# Patient Record
Sex: MALE | Race: WHITE | Employment: OTHER | URBAN - METROPOLITAN AREA
[De-identification: names, ages, dates, MRNs, and addresses within clinical notes are randomized per-mention and may not be internally consistent; named-entity substitution may affect disease eponyms.]

---

## 2023-10-20 ENCOUNTER — TELEPHONE (OUTPATIENT)
Dept: PRIMARY CARE | Facility: CLINIC | Age: 68
End: 2023-10-20
Payer: MEDICARE

## 2023-10-23 ENCOUNTER — OFFICE VISIT (OUTPATIENT)
Dept: PRIMARY CARE | Facility: CLINIC | Age: 68
End: 2023-10-23
Payer: MEDICARE

## 2023-10-23 ENCOUNTER — LAB (OUTPATIENT)
Dept: LAB | Facility: LAB | Age: 68
End: 2023-10-23
Payer: MEDICARE

## 2023-10-23 VITALS
SYSTOLIC BLOOD PRESSURE: 132 MMHG | TEMPERATURE: 98.3 F | WEIGHT: 295 LBS | HEART RATE: 75 BPM | DIASTOLIC BLOOD PRESSURE: 82 MMHG | BODY MASS INDEX: 41.14 KG/M2 | OXYGEN SATURATION: 91 %

## 2023-10-23 DIAGNOSIS — R10.31 GROIN PAIN, RIGHT: ICD-10-CM

## 2023-10-23 DIAGNOSIS — R79.89 LOW TESTOSTERONE IN MALE: ICD-10-CM

## 2023-10-23 DIAGNOSIS — M62.838 MUSCLE SPASM: ICD-10-CM

## 2023-10-23 DIAGNOSIS — R10.9 FLANK PAIN: Primary | ICD-10-CM

## 2023-10-23 PROBLEM — U07.1 COVID-19: Status: ACTIVE | Noted: 2023-01-17

## 2023-10-23 PROBLEM — F41.9 ANXIETY: Status: ACTIVE | Noted: 2023-10-23

## 2023-10-23 PROBLEM — L02.91 ABSCESS: Status: ACTIVE | Noted: 2023-01-17

## 2023-10-23 PROBLEM — I10 ESSENTIAL HYPERTENSION: Status: ACTIVE | Noted: 2023-01-17

## 2023-10-23 LAB
POC APPEARANCE, URINE: CLEAR
POC BILIRUBIN, URINE: NEGATIVE
POC BLOOD, URINE: NEGATIVE
POC COLOR, URINE: YELLOW
POC GLUCOSE, URINE: NEGATIVE MG/DL
POC KETONES, URINE: NEGATIVE MG/DL
POC LEUKOCYTES, URINE: NEGATIVE
POC NITRITE,URINE: NEGATIVE
POC PH, URINE: 5.5 PH
POC PROTEIN, URINE: NEGATIVE MG/DL
POC SPECIFIC GRAVITY, URINE: >=1.03
POC UROBILINOGEN, URINE: 0.2 EU/DL

## 2023-10-23 PROCEDURE — 1159F MED LIST DOCD IN RCRD: CPT | Performed by: STUDENT IN AN ORGANIZED HEALTH CARE EDUCATION/TRAINING PROGRAM

## 2023-10-23 PROCEDURE — 3079F DIAST BP 80-89 MM HG: CPT | Performed by: STUDENT IN AN ORGANIZED HEALTH CARE EDUCATION/TRAINING PROGRAM

## 2023-10-23 PROCEDURE — 81003 URINALYSIS AUTO W/O SCOPE: CPT | Performed by: STUDENT IN AN ORGANIZED HEALTH CARE EDUCATION/TRAINING PROGRAM

## 2023-10-23 PROCEDURE — 84402 ASSAY OF FREE TESTOSTERONE: CPT

## 2023-10-23 PROCEDURE — 36415 COLL VENOUS BLD VENIPUNCTURE: CPT

## 2023-10-23 PROCEDURE — 3075F SYST BP GE 130 - 139MM HG: CPT | Performed by: STUDENT IN AN ORGANIZED HEALTH CARE EDUCATION/TRAINING PROGRAM

## 2023-10-23 PROCEDURE — 99214 OFFICE O/P EST MOD 30 MIN: CPT | Performed by: STUDENT IN AN ORGANIZED HEALTH CARE EDUCATION/TRAINING PROGRAM

## 2023-10-23 PROCEDURE — 1036F TOBACCO NON-USER: CPT | Performed by: STUDENT IN AN ORGANIZED HEALTH CARE EDUCATION/TRAINING PROGRAM

## 2023-10-23 RX ORDER — SIMVASTATIN 20 MG/1
TABLET, FILM COATED ORAL
COMMUNITY
Start: 2015-11-05 | End: 2024-03-04

## 2023-10-23 RX ORDER — OLMESARTAN MEDOXOMIL 40 MG/1
TABLET ORAL
COMMUNITY
Start: 2023-09-11 | End: 2023-12-26

## 2023-10-23 RX ORDER — ESCITALOPRAM OXALATE 10 MG/1
TABLET ORAL
COMMUNITY
Start: 2023-09-19

## 2023-10-23 RX ORDER — CYCLOBENZAPRINE HCL 5 MG
5 TABLET ORAL NIGHTLY PRN
Qty: 30 TABLET | Refills: 0 | Status: SHIPPED | OUTPATIENT
Start: 2023-10-23 | End: 2023-10-24 | Stop reason: SDUPTHER

## 2023-10-23 RX ORDER — TESTOSTERONE 20.25 MG/1.25G
GEL TOPICAL
COMMUNITY
Start: 2023-06-27 | End: 2023-10-27 | Stop reason: SDUPTHER

## 2023-10-23 NOTE — ASSESSMENT & PLAN NOTE
No hernia on exam, fabricio RUIZ in origin  Patient recommended to continue ibuprofen PRN, Flexeril 5mg at bedtime ordered for muscle spasms still present in low back.

## 2023-10-23 NOTE — PROGRESS NOTES
"Subjective   Patient ID: Gen Solis is a 68 y.o. male who presents for Back Pain (PINCHED NERVE).  Right groin pain  Last week patient began having right sided back pain  Patient used icy-hot and ibuprofen which helped with symptoms  Back pain is significantly improved, but still gets occasional muscle spasms  Patient also reported sharp right groin pain  Feels like a \"pinched nerve\" that shoots through his right testicle  Pain is worse when going off stairs or when he lifts up his leg  Denies any recent injury or trauma to the area  Denies any bulges in that area  Denies any history of nephrolithiasis, or urinary changes     HPI    Review of Systems  All pertinent positive symptoms are included in the history of present illness.    All other systems have been reviewed and are negative and noncontributory to this patient's current ailments.     Allergies   Allergen Reactions    Bee Venom Protein (Honey Bee) Swelling    Codeine Other    Penicillins Unknown     Makes nerve endings tingle; tolerating amp/sulbactam 1/20/2023    Latex Rash and Unknown        Immunization History   Administered Date(s) Administered    DTaP vaccine, pediatric  (INFANRIX) 04/15/2015       Objective   Vitals:    10/23/23 0804   BP: 132/82   Pulse: 75   Temp: 36.8 °C (98.3 °F)   SpO2: 91%   Weight: 134 kg (295 lb)       Physical Exam  CONSTITUTIONAL - well nourished, well developed, looks like stated age, in no acute distress, not ill-appearing, and not tired appearing  SKIN - normal skin color and pigmentation, normal skin turgor without rash, lesions, or nodules visualized  HEAD - no trauma, normocephalic  EYES - normal external exam  NECK - supple without rigidity, no neck mass was observed, no thyromegaly or thyroid nodules  CHEST - clear to auscultation, no wheezing, no crackles and no rales, good effort  CARDIAC - regular rate and regular rhythm, no skipped beats, no murmur  ABDOMEN - no organomegaly, soft, nontender, " nondistended, normal bowel sounds, no guarding/rebound/rigidity, negative McBurney sign and negative Martínez sign   - normal testicular exam, no CVA tenderness  EXTREMITIES - no edema, no deformities  NEUROLOGICAL - normal gait, normal balance, normal motor, no ataxia, alert, oriented and no focal signs  PSYCHIATRIC - alert, pleasant and cordial, age-appropriate  IMMUNOLOGIC - no cervical lymphadenopathy         Assessment/Plan   Problem List Items Addressed This Visit       Flank pain - Primary     Will order UA to evaluate for blood in urine, will order CT if positive for hematuria          Relevant Orders    POCT UA Automated manually resulted    Groin pain, right     No hernia on exam, rodrigoley MSK in origin  Patient recommended to continue ibuprofen PRN, Flexeril 5mg at bedtime ordered for muscle spasms still present in low back.          Low testosterone in male    Relevant Orders    Testosterone, total and free

## 2023-10-24 DIAGNOSIS — M62.838 MUSCLE SPASM: ICD-10-CM

## 2023-10-24 DIAGNOSIS — R79.89 LOW TESTOSTERONE IN MALE: Primary | ICD-10-CM

## 2023-10-24 RX ORDER — CYCLOBENZAPRINE HCL 5 MG
5 TABLET ORAL NIGHTLY PRN
Qty: 30 TABLET | Refills: 0 | Status: SHIPPED | OUTPATIENT
Start: 2023-10-24 | End: 2023-12-23

## 2023-10-27 LAB
TESTOSTERONE FREE (CHAN): 60.5 PG/ML (ref 35–155)
TESTOSTERONE,TOTAL,LC-MS/MS: 238 NG/DL (ref 250–1100)

## 2023-10-27 RX ORDER — TESTOSTERONE 20.25 MG/1.25G
GEL TOPICAL
Qty: 75 G | Refills: 0 | Status: SHIPPED | OUTPATIENT
Start: 2023-10-27 | End: 2023-12-15 | Stop reason: SDUPTHER

## 2023-12-15 DIAGNOSIS — R79.89 LOW TESTOSTERONE IN MALE: ICD-10-CM

## 2023-12-15 RX ORDER — TESTOSTERONE 20.25 MG/1.25G
GEL TOPICAL
Qty: 75 G | Refills: 1 | Status: CANCELLED | OUTPATIENT
Start: 2023-12-15

## 2023-12-15 RX ORDER — TESTOSTERONE 20.25 MG/1.25G
GEL TOPICAL
Qty: 75 G | Refills: 0 | Status: CANCELLED | OUTPATIENT
Start: 2023-12-15

## 2023-12-15 RX ORDER — TESTOSTERONE 20.25 MG/1.25G
GEL TOPICAL
Qty: 75 G | Refills: 0 | Status: SHIPPED | OUTPATIENT
Start: 2023-12-15

## 2023-12-23 DIAGNOSIS — I10 HYPERTENSION, UNSPECIFIED TYPE: Primary | ICD-10-CM

## 2023-12-26 RX ORDER — OLMESARTAN MEDOXOMIL 40 MG/1
40 TABLET ORAL DAILY
Qty: 90 TABLET | Refills: 3 | Status: SHIPPED | OUTPATIENT
Start: 2023-12-26

## 2024-03-04 DIAGNOSIS — E78.5 HYPERLIPIDEMIA, UNSPECIFIED HYPERLIPIDEMIA TYPE: Primary | ICD-10-CM

## 2024-03-04 RX ORDER — SIMVASTATIN 20 MG/1
20 TABLET, FILM COATED ORAL DAILY
Qty: 30 TABLET | Refills: 0 | Status: SHIPPED | OUTPATIENT
Start: 2024-03-04 | End: 2024-05-24

## 2024-03-21 ENCOUNTER — OFFICE VISIT (OUTPATIENT)
Dept: PRIMARY CARE | Facility: CLINIC | Age: 69
End: 2024-03-21
Payer: MEDICARE

## 2024-03-21 VITALS
DIASTOLIC BLOOD PRESSURE: 96 MMHG | HEART RATE: 84 BPM | SYSTOLIC BLOOD PRESSURE: 169 MMHG | OXYGEN SATURATION: 95 % | HEIGHT: 71 IN | WEIGHT: 315 LBS | BODY MASS INDEX: 44.1 KG/M2 | TEMPERATURE: 97.6 F

## 2024-03-21 DIAGNOSIS — M19.012 PRIMARY OSTEOARTHRITIS OF BOTH SHOULDERS: Primary | ICD-10-CM

## 2024-03-21 DIAGNOSIS — M19.011 PRIMARY OSTEOARTHRITIS OF BOTH SHOULDERS: Primary | ICD-10-CM

## 2024-03-21 DIAGNOSIS — G89.29 CHRONIC PAIN OF BOTH SHOULDERS: ICD-10-CM

## 2024-03-21 DIAGNOSIS — M25.511 CHRONIC PAIN OF BOTH SHOULDERS: ICD-10-CM

## 2024-03-21 DIAGNOSIS — M25.512 CHRONIC PAIN OF BOTH SHOULDERS: ICD-10-CM

## 2024-03-21 DIAGNOSIS — M25.511 ACUTE PAIN OF RIGHT SHOULDER: ICD-10-CM

## 2024-03-21 PROCEDURE — 1124F ACP DISCUSS-NO DSCNMKR DOCD: CPT | Performed by: FAMILY MEDICINE

## 2024-03-21 PROCEDURE — 3077F SYST BP >= 140 MM HG: CPT | Performed by: FAMILY MEDICINE

## 2024-03-21 PROCEDURE — 1160F RVW MEDS BY RX/DR IN RCRD: CPT | Performed by: FAMILY MEDICINE

## 2024-03-21 PROCEDURE — 96372 THER/PROPH/DIAG INJ SC/IM: CPT

## 2024-03-21 PROCEDURE — 3080F DIAST BP >= 90 MM HG: CPT | Performed by: FAMILY MEDICINE

## 2024-03-21 PROCEDURE — 1159F MED LIST DOCD IN RCRD: CPT | Performed by: FAMILY MEDICINE

## 2024-03-21 PROCEDURE — 1125F AMNT PAIN NOTED PAIN PRSNT: CPT | Performed by: FAMILY MEDICINE

## 2024-03-21 PROCEDURE — 1036F TOBACCO NON-USER: CPT | Performed by: FAMILY MEDICINE

## 2024-03-21 RX ORDER — ACETAMINOPHEN 325 MG/1
TABLET ORAL EVERY 6 HOURS
COMMUNITY
Start: 2022-11-14

## 2024-03-21 RX ORDER — LIDOCAINE HYDROCHLORIDE 10 MG/ML
0.5 INJECTION INFILTRATION; PERINEURAL
Status: COMPLETED | OUTPATIENT
Start: 2024-03-21 | End: 2024-03-21

## 2024-03-21 RX ORDER — TRIAMCINOLONE ACETONIDE 40 MG/ML
40 INJECTION, SUSPENSION INTRA-ARTICULAR; INTRAMUSCULAR
Status: COMPLETED | OUTPATIENT
Start: 2024-03-21 | End: 2024-03-21

## 2024-03-21 RX ORDER — TRIAMCINOLONE ACETONIDE 40 MG/ML
40 INJECTION, SUSPENSION INTRA-ARTICULAR; INTRAMUSCULAR ONCE
Status: COMPLETED | OUTPATIENT
Start: 2024-03-21 | End: 2024-03-21

## 2024-03-21 RX ADMIN — TRIAMCINOLONE ACETONIDE 40 MG: 40 INJECTION, SUSPENSION INTRA-ARTICULAR; INTRAMUSCULAR at 12:05

## 2024-03-21 RX ADMIN — LIDOCAINE HYDROCHLORIDE 0.5 ML: 10 INJECTION INFILTRATION; PERINEURAL at 12:15

## 2024-03-21 RX ADMIN — TRIAMCINOLONE ACETONIDE 40 MG: 40 INJECTION, SUSPENSION INTRA-ARTICULAR; INTRAMUSCULAR at 12:15

## 2024-03-21 ASSESSMENT — PATIENT HEALTH QUESTIONNAIRE - PHQ9
2. FEELING DOWN, DEPRESSED OR HOPELESS: NOT AT ALL
1. LITTLE INTEREST OR PLEASURE IN DOING THINGS: NOT AT ALL
SUM OF ALL RESPONSES TO PHQ9 QUESTIONS 1 AND 2: 0

## 2024-03-21 ASSESSMENT — PAIN SCALES - GENERAL: PAINLEVEL: 8

## 2024-03-21 NOTE — PROGRESS NOTES
Patient ID: Gen Solis is a 69 y.o. male.    Injection Upper Extremity (Right deltoid) for osteoarthritis on 3/21/2024 12:15 PM  Indications: pain  Details: 20 G needle, dorsal approach  Medications: 40 mg triamcinolone acetonide 40 mg/mL; 0.5 mL lidocaine 10 mg/mL (1 %)  Aspirate: clear  Procedure, treatment alternatives, risks and benefits explained, specific risks discussed. Consent was given by the patient. Patient was prepped and draped in the usual sterile fashion.

## 2024-03-21 NOTE — ASSESSMENT & PLAN NOTE
A steroid injection was provided into the right shoulder. In the interim, please obtain an x-ray of your bilateral shoulders. We will follow-up with further management once the results become available.

## 2024-03-21 NOTE — PROGRESS NOTES
Subjective   Patient ID: Gen Solis is a 69 y.o. male who presents for rt shoulder pain (Rt shoulder pain this shoulder is the worst frozen shoulder), lt shoulder pain (Left shoulder is also bothering him), and Med Refill (Would like Testerone gel refilled ).    Past Medical, Surgical, and Family History reviewed and updated in chart.    Reviewed all medications by prescribing practitioner or clinical pharmacist (such as prescriptions, OTCs, herbal therapies and supplements) and documented in the medical record.    HPI  1.  Acute Right shoulder pain  Patient was previously a  lifting heavy objects for 30 years   He joined a gym within the past 6 months but wife believes the exercises he performed exacerbated his shoulder pain   Has never had prior imaging of the right shoulder or steroid injections  Takes Advil, tylenol and Voltaren gel and lidocaine patches w/ minimal improvement of symptoms     2. Left shoulder pain  Has chronic left shoulder pain from reasons as noted above  Last xray was completed 11/2022 and was notable for osteoarthritis of the left shoulder   No surgeries or injections to the left shoulder in the past     Review of Systems  All pertinent positive symptoms are included in the history of present illness.    All other systems have been reviewed and are negative and noncontributory to this patient's current ailments.    History reviewed. No pertinent past medical history.  History reviewed. No pertinent surgical history.  Social History     Tobacco Use    Smoking status: Never    Smokeless tobacco: Never   Vaping Use    Vaping Use: Never used   Substance Use Topics    Alcohol use: Not Currently    Drug use: Never     No family history on file.  Immunization History   Administered Date(s) Administered    DTaP vaccine, pediatric  (INFANRIX) 04/15/2015     Current Outpatient Medications   Medication Instructions    acetaminophen (TylenoL) 325 mg tablet oral, Every 6 hours     "escitalopram (Lexapro) 10 mg tablet     olmesartan (BENICAR) 40 mg, oral, Daily    simvastatin (ZOCOR) 20 mg, oral, Daily    testosterone 20.25 mg/1.25 gram (1.62 %) gel in metered-dose pump use 2 pumps EVERY DAY AS DIRECTED     Allergies   Allergen Reactions    Bee Venom Protein (Honey Bee) Swelling    Codeine Other    Penicillins Unknown     Makes nerve endings tingle; tolerating amp/sulbactam 1/20/2023    Latex Rash and Unknown       Objective   Vitals:    03/21/24 1059   BP: (!) 169/96   Pulse: 84   Temp: 36.4 °C (97.6 °F)   SpO2: 95%   Weight: 143 kg (316 lb 3.2 oz)   Height: 1.803 m (5' 11\")     Body mass index is 44.1 kg/m².    BP Readings from Last 3 Encounters:   03/21/24 (!) 169/96   10/23/23 132/82      Wt Readings from Last 3 Encounters:   03/21/24 143 kg (316 lb 3.2 oz)   10/23/23 134 kg (295 lb)   02/13/23 137 kg (303 lb)        No visits with results within 1 Month(s) from this visit.   Latest known visit with results is:   Lab on 10/23/2023   Component Date Value    Testosterone, Free 10/23/2023 60.5     Testosterone, Total, LC-* 10/23/2023 238 (L)      Physical Exam  CONSTITUTIONAL - well nourished, well developed, looks like stated age, in no acute distress, not ill-appearing, and not tired appearing  SKIN - normal skin color and pigmentation, normal skin turgor without rash, lesions, or nodules visualized  HEAD - no trauma, normocephalic  EYES -extraocular muscles are intact, and normal external exam  CHEST - no evidence of respiratory distress   CARDIAC - no evidence of cyanosis or swelling on the extremities   NEUROLOGICAL - alert and oriented   PSYCHIATRIC - alert, pleasant and cordial, age-appropriate  MSK- bilateral arm flexion limited to 90 degrees. Internal and external rotation intact in teres. Significantly Reduced range of motion in internal rotation of the subscapularis.      Assessment/Plan   Problem List Items Addressed This Visit       Primary osteoarthritis of both shoulders - " Primary     A steroid injection was provided into the right shoulder. In the interim, please obtain an x-ray of your bilateral shoulders. We will follow-up with further management once the results become available.         Relevant Medications    triamcinolone acetonide (Kenalog-40) injection 40 mg (Completed) (Start on 3/21/2024 12:30 PM)    Other Relevant Orders    XR shoulder left 2+ views    Referral to Physical Therapy    XR shoulder right 2+ views     Other Visit Diagnoses       Chronic pain of both shoulders        Requisition for physical therapy placed.    Relevant Medications    triamcinolone acetonide (Kenalog-40) injection 40 mg (Completed) (Start on 3/21/2024 12:30 PM)    Other Relevant Orders    XR shoulder left 2+ views    Referral to Physical Therapy    XR shoulder right 2+ views    Acute pain of right shoulder        Relevant Medications    triamcinolone acetonide (Kenalog-40) injection 40 mg (Completed) (Start on 3/21/2024 12:30 PM)

## 2024-03-21 NOTE — PROGRESS NOTES
I reviewed and examined the patient. I was present for the key exam elements, and I fully participated in the patient's care. I discussed the management of the care with the resident. I have personally reviewed the pertinent labs and imaging, as well as recent notes, with the patient. I have reviewed the note above and agree with the resident's medical decision making as documented in the resident's note, in addition to the following comments / findings:     Agree with the rest of the plan outlined below by resident physician. No red flags.      The patient understands and agrees to the assessment and plan of care. Patient has also agreed to follow up and comply with the treatment and evaluation as recommended today. Patient was instructed to call the office at [phone] should questions arise regarding their treatment or care.     Pierce Leonard DO, FAOASM  Family Medicine   [address], [address]  [place] [address]     Pierce Leonard DO

## 2024-04-05 ENCOUNTER — EVALUATION (OUTPATIENT)
Dept: OCCUPATIONAL THERAPY | Facility: CLINIC | Age: 69
End: 2024-04-05
Payer: MEDICARE

## 2024-04-05 DIAGNOSIS — M19.012 PRIMARY OSTEOARTHRITIS OF BOTH SHOULDERS: ICD-10-CM

## 2024-04-05 DIAGNOSIS — G89.29 CHRONIC PAIN OF BOTH SHOULDERS: ICD-10-CM

## 2024-04-05 DIAGNOSIS — M25.511 CHRONIC PAIN OF BOTH SHOULDERS: ICD-10-CM

## 2024-04-05 DIAGNOSIS — M19.011 PRIMARY OSTEOARTHRITIS OF BOTH SHOULDERS: ICD-10-CM

## 2024-04-05 DIAGNOSIS — M25.512 CHRONIC PAIN OF BOTH SHOULDERS: ICD-10-CM

## 2024-04-05 PROCEDURE — 97165 OT EVAL LOW COMPLEX 30 MIN: CPT | Mod: GO | Performed by: OCCUPATIONAL THERAPIST

## 2024-04-05 PROCEDURE — 97110 THERAPEUTIC EXERCISES: CPT | Mod: GO | Performed by: OCCUPATIONAL THERAPIST

## 2024-04-05 NOTE — PROGRESS NOTES
"    Occupational Therapy Orthopedic Evaluation    Patient Name: Gen Solis  MRN: [id number]  Today's Date: 4/5/2024  Time Calculation  Start Time: 0850  Stop Time: 0935  Time Calculation (min): 45 min    Insurance:  Visit number: 1 of 10  Authorization info: Required  Insurance Type: [employer]  Authorization Certification Period -  Start: 4/5/2024  End: 6/28/24    Current Problem  1. Primary osteoarthritis of both shoulders  Referral to Physical Therapy    Follow Up In Occupational Therapy      2. Chronic pain of both shoulders  Referral to Physical Therapy    Follow Up In Occupational Therapy    Requisition for physical therapy placed.          Referred by: Pierce Leonard DO  Referred for:  B shoulder pain.OA  Onset/DOI: 3/21/24    Fall risk:  none  Precautions:  none     Medical History Form: Reviewed (scanned into chart)    Subjective   Chief Complaint: R shoulder pain impacting performance with ADLs/IADLs    Hand Dominance: Right    Pain:  R shoulder 8/10  L shoulder 4/10  Location: B proximal shoulder/proximal humerus  Description: stabbing, aching, localized  Aggravating Factors:  activity, reaching above/laterally/and behind back  Relieving Factors:  Voltaren gel, NSAIDs    Previous Interventions/Treatments: None    Prior Level of Function (PLOF)  Patient previously Mod I with all ADLs/IADLs      ADL/IADL impairments  Bathing, Dressing, Hygiene/Grooming, Sleep, Driving, and Home mgt  - Bathing behind back  - Toilet hygiene  - Dressing pants and shirt overhead  - Applying deodorant under arms  - Reaching into cabinets  - Gardening  - Outdoor home mgt    Patients Living Environment: Reviewed and no concern  Lives with: Wife and adult son  Primary Language: English  Patient's Goal(s) for Therapy:  \"use my arms again as normal as possible and not have to depend on my wife\"    Red Flags: Do you have any of the following? No  Fever/chills, unexplained weight changes, dizziness/fainting, unexplained change in " bowel or bladder functions, unexplained malaise or muscle weakness, night pain/sweats, numbness or tingling      Objective   SHOULDER AROM (Degrees)  Flexion R L   Flexion 90 (A) 100  (A)   Abduction 70 (A) 55  (A)   IR at 75 degrees abd 50 (A) 40  (A)   Er at 75 degrees abd 45 (A) 30  (A)     SHOULDER STRENGTH (MMT)  Flexion R L   Flexion 3-   pain and crepitus 3+   Extension 5 5   Abduction 3 3   IR at 0 degrees abd 5 5   ER at 0 degrees abd 3- 3+       Outcome Measures:         QUICK DASH:  47.73%     Home Program:  Exercise Sets/Reps Comments   Supine bench press with cane 3x10 3x/day   Supine overhead flexion with cane     Supine cane ER arm at side     Standing pendulum                                     Treatment Today  - OT eval completed  - Therapeutic ex        - educated, demo, and return demo of the above HEP to ensure proper carryover to home       EDUCATION: Home exercise program, plan of care, activity modifications, joint protection, pain management, and injury pathology       Assessment:   Pt is 69 year old male who has chronic B shoulder OA and is presenting today for evaluation with complaints of decreased strength, decreased ROM, and increased pain.  As a result of these impairments pt is having difficulty with upper body/lower body bathing and dressing tasks, difficulty with toilet hygiene, is experiencing loss of sleep, and is having difficulty with home management and reaching into cabinets for prep tasks.  Without skilled intervention patient is at risk for further loss of ROM, loss of strength, reduced ADL./IADL function, and is at risk for requiring caregiver assistance for self care completion.  Patient to benefit from skilled services to address the impairments found in order to return to independent prior level of function.      Plan:     Planned Interventions include: therapeutic exercise, self-care home management, manual therapy, therapeutic activities, , neuromuscular coordination,  vasopneumatic, dry needling, and orthosis fabrication.  Frequency: 2 x Week  Duration: 12 Weeks    Goals: Set and discussed today         1) Patient to demo at least 120 degrees of B shoulder flexion to complete overhead dressing tasks with SUP, in 8 weeks.         2) Patient to demo 4-/5 MMT of B shoulder flexion required to reach into cabinets for home mgt tasks, in 8 weeks         3) Patient report no more than 2-3 sleep disturbances in a week as a result of reduced shoulder pain, in 6 weeks.         4) Pt will be able to perform toilet hygiene with no more than 4/10 R shoulder pain, in 8 weeks.        Plan of care was developed with input and agreement by the patient.      Siddharth Pimentel, OT

## 2024-04-09 ENCOUNTER — APPOINTMENT (OUTPATIENT)
Dept: OCCUPATIONAL THERAPY | Facility: CLINIC | Age: 69
End: 2024-04-09
Payer: MEDICARE

## 2024-04-12 ENCOUNTER — TREATMENT (OUTPATIENT)
Dept: OCCUPATIONAL THERAPY | Facility: CLINIC | Age: 69
End: 2024-04-12
Payer: MEDICARE

## 2024-04-12 DIAGNOSIS — G89.29 CHRONIC PAIN OF BOTH SHOULDERS: ICD-10-CM

## 2024-04-12 DIAGNOSIS — M25.512 CHRONIC PAIN OF BOTH SHOULDERS: ICD-10-CM

## 2024-04-12 DIAGNOSIS — M19.011 PRIMARY OSTEOARTHRITIS OF BOTH SHOULDERS: ICD-10-CM

## 2024-04-12 DIAGNOSIS — M19.012 PRIMARY OSTEOARTHRITIS OF BOTH SHOULDERS: ICD-10-CM

## 2024-04-12 DIAGNOSIS — M25.511 CHRONIC PAIN OF BOTH SHOULDERS: ICD-10-CM

## 2024-04-12 PROCEDURE — 97110 THERAPEUTIC EXERCISES: CPT | Mod: GO | Performed by: OCCUPATIONAL THERAPIST

## 2024-04-12 NOTE — PROGRESS NOTES
Occupational Therapy Orthopedic Treatment Note    Patient Name: Gen Solis  MRN: [id number]  Today's Date: 4/12/2024       Insurance:  Visit number: 2 of 10  Authorization info: Required  Insurance Type: [employer]  Authorization Certification Period -  Start: 4/5/2024  End: 6/28/24    Current Problem  1. Primary osteoarthritis of both shoulders  Follow Up In Occupational Therapy      2. Chronic pain of both shoulders  Follow Up In Occupational Therapy    Requisition for physical therapy placed.          Referred by: Pierce Leonard DO  Referred for:  B shoulder pain.OA  Onset/DOI: 3/21/24    Fall risk:  none  Precautions:  none     Medical History Form: Reviewed (scanned into chart)    Subjective   Pt reports he has not been able to perform is HEP since start of care citing personal reasons    Hand Dominance: Right    Pain:  R shoulder 8/10  L shoulder 4/10  Location: B proximal shoulder/proximal humerus  Description: stabbing, aching, localized      Objective   SHOULDER AROM (Degrees)  Flexion R L   Flexion 90 (A) 100  (A)   Abduction 70 (A) 55  (A)   IR at 75 degrees abd 50 (A) 40  (A)   Er at 75 degrees abd 45 (A) 30  (A)     SHOULDER STRENGTH (MMT)  Flexion R L   Flexion 3-   pain and crepitus 3+   Extension 5 5   Abduction 3 3   IR at 0 degrees abd 5 5   ER at 0 degrees abd 3- 3+       Outcome Measures:         QUICK DASH:  47.73%     Home Program:  Exercise Sets/Reps Comments   Supine bench press with cane 3x10 3x/day   Supine overhead flexion with cane     Supine cane ER arm at side     Standing pendulum                                     Treatment Today    Therapeutic ex  - supine AA bench press with cane to increase anterior shoulder strength 3x10  - supine AA overhead flexion to increase ROM 3x10  - supine shoulder ADD isometrics with ball at midline to increase strength 5sec x 20  - B light tband ER to increase RTC strength 3x10  - B shoulder flexion isometric with ball on wall to increase strength  10sec x 10  - extensive review of HEP to ensure proper carryover to home x 10min      EDUCATION: Home exercise program, plan of care, activity modifications, joint protection, pain management, and injury pathology       Assessment:   Patient was able to tolerate all exercises and strengthening intervention today with low reactivity and extended periods of rest for recovery.  He did require review of his entire HEP as he was unable to complete it since start of care.  He has good understanding of it after today and further understands to begin shoulder flexion isometrics as well.  Reported to feel fatigue and soreness but not increased joint pain.        Plan:     Planned Interventions include: therapeutic exercise, self-care home management, manual therapy, therapeutic activities, , neuromuscular coordination, vasopneumatic, dry needling, and orthosis fabrication.  Frequency: 2 x Week  Duration: 12 Weeks    Goals: Set and discussed today         1) Patient to demo at least 120 degrees of B shoulder flexion to complete overhead dressing tasks with SUP, in 8 weeks.         2) Patient to demo 4-/5 MMT of B shoulder flexion required to reach into cabinets for home mgt tasks, in 8 weeks         3) Patient report no more than 2-3 sleep disturbances in a week as a result of reduced shoulder pain, in 6 weeks.         4) Pt will be able to perform toilet hygiene with no more than 4/10 R shoulder pain, in 8 weeks.        Plan of care was developed with input and agreement by the patient.      Siddharth Pimentel, OT

## 2024-04-16 ENCOUNTER — TREATMENT (OUTPATIENT)
Dept: OCCUPATIONAL THERAPY | Facility: CLINIC | Age: 69
End: 2024-04-16
Payer: MEDICARE

## 2024-04-16 DIAGNOSIS — M25.512 CHRONIC PAIN OF BOTH SHOULDERS: ICD-10-CM

## 2024-04-16 DIAGNOSIS — G89.29 CHRONIC PAIN OF BOTH SHOULDERS: ICD-10-CM

## 2024-04-16 DIAGNOSIS — M19.012 PRIMARY OSTEOARTHRITIS OF BOTH SHOULDERS: ICD-10-CM

## 2024-04-16 DIAGNOSIS — M19.011 PRIMARY OSTEOARTHRITIS OF BOTH SHOULDERS: ICD-10-CM

## 2024-04-16 DIAGNOSIS — M25.511 CHRONIC PAIN OF BOTH SHOULDERS: ICD-10-CM

## 2024-04-16 PROCEDURE — 97110 THERAPEUTIC EXERCISES: CPT | Mod: GO | Performed by: OCCUPATIONAL THERAPIST

## 2024-04-16 NOTE — PROGRESS NOTES
"    Occupational Therapy Orthopedic Treatment Note    Patient Name: Gen Solis  MRN: [id number]  Today's Date: 4/16/2024  Time Calculation  Start Time: 0215  Stop Time: 0255  Time Calculation (min): 40 min    Insurance:  Visit number: 3 of 10  Authorization info: Required  Insurance Type: [employer]  Authorization Certification Period -  Start: 4/5/2024  End: 6/28/24    Current Problem  1. Primary osteoarthritis of both shoulders  Follow Up In Occupational Therapy      2. Chronic pain of both shoulders  Follow Up In Occupational Therapy    Requisition for physical therapy placed.          Referred by: Pierce Leonard DO  Referred for:  B shoulder pain.OA  Onset/DOI: 3/21/24    Fall risk:  none  Precautions:  none     Medical History Form: Reviewed (scanned into chart)    Subjective   \"I have a hard time reaching into cabinets and putting dishes away\"    Hand Dominance: Right    Pain:  R shoulder 8/10  L shoulder 4/10  Location: B proximal shoulder/proximal humerus  Description: stabbing, aching, localized      Objective   SHOULDER AROM (Degrees)  Flexion R L   Flexion 90 (A) 100  (A)   Abduction 70 (A) 55  (A)   IR at 75 degrees abd 50 (A) 40  (A)   Er at 75 degrees abd 45 (A) 30  (A)     SHOULDER STRENGTH (MMT)  Flexion R L   Flexion 3-   pain and crepitus 3+   Extension 5 5   Abduction 3 3   IR at 0 degrees abd 5 5   ER at 0 degrees abd 3- 3+       Outcome Measures:         QUICK DASH:  47.73%     Home Program:  Exercise Sets/Reps Comments   Supine bench press with cane 3x10 3x/day   Supine overhead flexion with cane     Supine cane ER arm at side     Standing pendulum                                     Treatment Today    Therapeutic ex  - supine AA bench press with cane to increase anterior shoulder strength 3x10  - supine AA overhead flexion to increase ROM 3x10  - supine scapular ABCs with 4lb DB to increase strength 3x  - supine B shoulder ADD isometrics with ball at side  to increase strength 5sec x 20  - B " light tband ER to increase RTC strength 3x10  - B shoulder flexion isometric with ball on wall to increase strength 10sec x 10  - extensive review of HEP to ensure proper carryover to home x 10min      EDUCATION: Home exercise program, plan of care, activity modifications, joint protection, pain management, and injury pathology       Assessment:   Patient was able to tolerate all exercises and strengthening intervention today with low reactivity.  Completed isoemtrics and scapular ABCs well with low reactivity. Reported to feel fatigue and soreness but not increased joint pain.        Plan:     Planned Interventions include: therapeutic exercise, self-care home management, manual therapy, therapeutic activities, , neuromuscular coordination, vasopneumatic, dry needling, and orthosis fabrication.  Frequency: 2 x Week  Duration: 12 Weeks    Goals: Set and discussed today         1) Patient to demo at least 120 degrees of B shoulder flexion to complete overhead dressing tasks with SUP, in 8 weeks.         2) Patient to demo 4-/5 MMT of B shoulder flexion required to reach into cabinets for home mgt tasks, in 8 weeks         3) Patient report no more than 2-3 sleep disturbances in a week as a result of reduced shoulder pain, in 6 weeks.         4) Pt will be able to perform toilet hygiene with no more than 4/10 R shoulder pain, in 8 weeks.        Plan of care was developed with input and agreement by the patient.      Siddharth Pimentel OT

## 2024-04-19 ENCOUNTER — TREATMENT (OUTPATIENT)
Dept: OCCUPATIONAL THERAPY | Facility: CLINIC | Age: 69
End: 2024-04-19
Payer: MEDICARE

## 2024-04-19 DIAGNOSIS — G89.29 CHRONIC PAIN OF BOTH SHOULDERS: ICD-10-CM

## 2024-04-19 DIAGNOSIS — M19.012 PRIMARY OSTEOARTHRITIS OF BOTH SHOULDERS: ICD-10-CM

## 2024-04-19 DIAGNOSIS — M25.512 CHRONIC PAIN OF BOTH SHOULDERS: ICD-10-CM

## 2024-04-19 DIAGNOSIS — M25.511 CHRONIC PAIN OF BOTH SHOULDERS: ICD-10-CM

## 2024-04-19 DIAGNOSIS — M19.011 PRIMARY OSTEOARTHRITIS OF BOTH SHOULDERS: ICD-10-CM

## 2024-04-19 PROCEDURE — 97110 THERAPEUTIC EXERCISES: CPT | Mod: GO | Performed by: OCCUPATIONAL THERAPIST

## 2024-04-19 NOTE — PROGRESS NOTES
Occupational Therapy Orthopedic Treatment Note    Patient Name: Gen Solis  MRN: [id number]  Today's Date: 4/19/2024       Insurance:  Visit number: 4 of 10  Authorization info: Required  Insurance Type: [employer]  Authorization Certification Period -  Start: 4/5/2024  End: 6/28/24    Current Problem  1. Primary osteoarthritis of both shoulders  Follow Up In Occupational Therapy      2. Chronic pain of both shoulders  Follow Up In Occupational Therapy    Requisition for physical therapy placed.          Referred by: Pierce Leonard DO  Referred for:  B shoulder pain.OA  Onset/DOI: 3/21/24    Fall risk:  none  Precautions:  none     Medical History Form: Reviewed (scanned into chart)    Subjective         Hand Dominance: Right    Pain:  R shoulder 8/10  L shoulder 4/10  Location: B proximal shoulder/proximal humerus  Description: stabbing, aching, localized      Objective   SHOULDER AROM (Degrees)  Flexion R L   Flexion 90 (A) 100  (A)   Abduction 70 (A) 55  (A)   IR at 75 degrees abd 50 (A) 40  (A)   Er at 75 degrees abd 45 (A) 30  (A)     SHOULDER STRENGTH (MMT)  Flexion R L   Flexion 3-   pain and crepitus 3+   Extension 5 5   Abduction 3 3   IR at 0 degrees abd 5 5   ER at 0 degrees abd 3- 3+       Outcome Measures:         QUICK DASH:  47.73%     Home Program:  Exercise Sets/Reps Comments   Supine bench press with cane 3x10 3x/day   Supine overhead flexion with cane     Supine cane ER arm at side     Standing pendulum                                     Treatment Today    Therapeutic ex  - supine AA bench press with cane to increase anterior shoulder strength 3x10  - supine AA overhead flexion to increase ROM 3x10  - supine scapular ABCs with 4lb DB to increase strength 3x  - supine B shoulder ADD isometrics with ball at side  to increase strength 5sec x 20  - B light tband ER to increase RTC strength 3x10  - B shoulder flexion isometric with ball on wall to increase strength 10sec x 10  - B shoulder  extension isometrics with tband to increase posterior chain strength   - B shoulder rows with heavy tband to increase bicep strength 3x10      EDUCATION: Home exercise program, plan of care, activity modifications, joint protection, pain management, and injury pathology       Assessment:   Patient was able to tolerate all exercises and strengthening intervention today with low reactivity.  Completed isoemtrics and scapular ABCs well with low reactivity. Reported to feel fatigue and soreness but not increased joint pain.  Pt demonstrating steady progress towards all goals without complication.        Plan:     Planned Interventions include: therapeutic exercise, self-care home management, manual therapy, therapeutic activities, , neuromuscular coordination, vasopneumatic, dry needling, and orthosis fabrication.  Frequency: 2 x Week  Duration: 12 Weeks    Goals: Set and discussed today         1) Patient to demo at least 120 degrees of B shoulder flexion to complete overhead dressing tasks with SUP, in 8 weeks.         2) Patient to demo 4-/5 MMT of B shoulder flexion required to reach into cabinets for home mgt tasks, in 8 weeks         3) Patient report no more than 2-3 sleep disturbances in a week as a result of reduced shoulder pain, in 6 weeks.         4) Pt will be able to perform toilet hygiene with no more than 4/10 R shoulder pain, in 8 weeks.        Plan of care was developed with input and agreement by the patient.      Siddharth Pimentel OT

## 2024-04-23 ENCOUNTER — TREATMENT (OUTPATIENT)
Dept: OCCUPATIONAL THERAPY | Facility: CLINIC | Age: 69
End: 2024-04-23
Payer: MEDICARE

## 2024-04-23 DIAGNOSIS — M25.512 CHRONIC PAIN OF BOTH SHOULDERS: ICD-10-CM

## 2024-04-23 DIAGNOSIS — M19.012 PRIMARY OSTEOARTHRITIS OF BOTH SHOULDERS: ICD-10-CM

## 2024-04-23 DIAGNOSIS — M25.511 CHRONIC PAIN OF BOTH SHOULDERS: ICD-10-CM

## 2024-04-23 DIAGNOSIS — M19.011 PRIMARY OSTEOARTHRITIS OF BOTH SHOULDERS: ICD-10-CM

## 2024-04-23 DIAGNOSIS — G89.29 CHRONIC PAIN OF BOTH SHOULDERS: ICD-10-CM

## 2024-04-23 PROCEDURE — 97110 THERAPEUTIC EXERCISES: CPT | Mod: GO | Performed by: OCCUPATIONAL THERAPIST

## 2024-04-23 NOTE — PROGRESS NOTES
"    Occupational Therapy Orthopedic Treatment Note    Patient Name: Gen Solis  MRN: [id number]  Today's Date: 4/23/2024       Insurance:  Visit number: 5 of 10  Authorization info: Required  Insurance Type: [employer]  Authorization Certification Period -  Start: 4/5/2024  End: 6/28/24    Current Problem  1. Primary osteoarthritis of both shoulders  Follow Up In Occupational Therapy      2. Chronic pain of both shoulders  Follow Up In Occupational Therapy    Requisition for physical therapy placed.          Referred by: Pierce Leonard DO  Referred for:  B shoulder pain.OA  Onset/DOI: 3/21/24    Fall risk:  none  Precautions:  none     Medical History Form: Reviewed (scanned into chart)    Subjective   \"To be honest I dont think anything has improved.  Anytime I raise my arms above my shoulders its incredible pain. Or if I sleep on it the pain is pretty intense\"      Hand Dominance: Right    Pain:  R shoulder 8/10  L shoulder 4/10  Location: B proximal shoulder/proximal humerus  Description: stabbing, aching, localized      Objective   SHOULDER AROM (Degrees)  Flexion R L   Flexion 90 (A) 100  (A)   Abduction 70 (A) 55  (A)   IR at 75 degrees abd 50 (A) 40  (A)   Er at 75 degrees abd 45 (A) 30  (A)     SHOULDER STRENGTH (MMT)  Flexion R L   Flexion 3-   pain and crepitus 3+   Extension 5 5   Abduction 3 3   IR at 0 degrees abd 5 5   ER at 0 degrees abd 3- 3+       Outcome Measures:         QUICK DASH:  47.73%                   Treatment Today    Therapeutic ex  - supine scapular ABCs with 4lb DB to increase strength 3x  - supine B shoulder ADD isometrics with ball at side  to increase strength 5sec x 20  - B light tband ER to increase RTC strength 3x10  - B shoulder flexion isometric with ball on wall to increase strength 10sec x 10  - B shoulder extension with light tband to increase strength 4x10  - B shoulder extension isometrics with tband to increase posterior chain strength x 10min  - B shoulder rows with " heavy tband to increase bicep strength 3x10  - B shoulder flexion rhythmic stabilization at midline with Body Blade 20sec x 10sets    EDUCATION: Home exercise program, plan of care, activity modifications, joint protection, pain management, and injury pathology       Assessment:   Patient was able to tolerate all exercises and strengthening intervention today with low reactivity.  Patient is able to complete resistive exercises up to 90 degrees of bilateral shoulder flexion.  Once he crosses that threshold he begins to have immediate pain in both shoulders which is a result of the underlying arthritis present.  He requires extended periods of rest between sets for active recovery.      Plan:     Planned Interventions include: therapeutic exercise, self-care home management, manual therapy, therapeutic activities, , neuromuscular coordination, vasopneumatic, dry needling, and orthosis fabrication.  Frequency: 2 x Week  Duration: 12 Weeks    Goals: Set and discussed today         1) Patient to demo at least 120 degrees of B shoulder flexion to complete overhead dressing tasks with SUP, in 8 weeks.         2) Patient to demo 4-/5 MMT of B shoulder flexion required to reach into cabinets for home mgt tasks, in 8 weeks         3) Patient report no more than 2-3 sleep disturbances in a week as a result of reduced shoulder pain, in 6 weeks.         4) Pt will be able to perform toilet hygiene with no more than 4/10 R shoulder pain, in 8 weeks.        Plan of care was developed with input and agreement by the patient.      Siddharth Pimentel, OT

## 2024-04-26 ENCOUNTER — TREATMENT (OUTPATIENT)
Dept: OCCUPATIONAL THERAPY | Facility: CLINIC | Age: 69
End: 2024-04-26
Payer: MEDICARE

## 2024-04-26 DIAGNOSIS — M19.012 PRIMARY OSTEOARTHRITIS OF BOTH SHOULDERS: ICD-10-CM

## 2024-04-26 DIAGNOSIS — M25.511 CHRONIC PAIN OF BOTH SHOULDERS: ICD-10-CM

## 2024-04-26 DIAGNOSIS — M25.512 CHRONIC PAIN OF BOTH SHOULDERS: ICD-10-CM

## 2024-04-26 DIAGNOSIS — G89.29 CHRONIC PAIN OF BOTH SHOULDERS: ICD-10-CM

## 2024-04-26 DIAGNOSIS — M19.011 PRIMARY OSTEOARTHRITIS OF BOTH SHOULDERS: ICD-10-CM

## 2024-04-26 PROCEDURE — 97110 THERAPEUTIC EXERCISES: CPT | Mod: GO | Performed by: OCCUPATIONAL THERAPIST

## 2024-04-26 NOTE — PROGRESS NOTES
"    Occupational Therapy Orthopedic Treatment Note    Patient Name: eGn Solis  MRN: [id number]  Today's Date: 4/26/2024       Insurance:  Visit number: 6 of 10  Authorization info: Required  Insurance Type: [employer]  Authorization Certification Period -  Start: 4/5/2024  End: 6/28/24    Current Problem  1. Primary osteoarthritis of both shoulders  Follow Up In Occupational Therapy      2. Chronic pain of both shoulders  Follow Up In Occupational Therapy    Requisition for physical therapy placed.          Referred by: Pierce Leonard DO  Referred for:  B shoulder pain.OA  Onset/DOI: 3/21/24    Fall risk:  none  Precautions:  none       Subjective   \"To be honest I dont think anything has improved.  Anytime I raise my arms above my shoulders its incredible pain. Or if I sleep on it the pain is pretty intense\"      Hand Dominance: Right    Pain:  R shoulder 8/10  L shoulder 4/10  Location: B proximal shoulder/proximal humerus  Description: stabbing, aching, localized      Objective   SHOULDER AROM (Degrees)  Flexion R L   Flexion 90 (A) 100  (A)   Abduction 70 (A) 55  (A)   IR at 75 degrees abd 50 (A) 40  (A)   Er at 75 degrees abd 45 (A) 30  (A)     SHOULDER STRENGTH (MMT)  Flexion R L   Flexion 3-   pain and crepitus 3+   Extension 5 5   Abduction 3 3   IR at 0 degrees abd 5 5   ER at 0 degrees abd 3- 3+       Outcome Measures:         QUICK DASH:  47.73%                   Treatment Today    Therapeutic ex  - supine scapular ABCs with 4lb DB to increase strength 3x  - supine B shoulder ADD isometrics with ball at side  to increase strength 5sec x 20  - B light tband ER to increase RTC strength 3x10  - B shoulder flexion isometric with ball on wall to increase strength 10sec x 10  - B shoulder extension with light tband to increase strength 4x10  - B shoulder extension isometrics with tband to increase posterior chain strength x 10min  - B shoulder rows with heavy tband to increase bicep strength 3x10  - B " shoulder flexion rhythmic stabilization at midline with Body Blade 20sec x 10sets    EDUCATION: Home exercise program, plan of care, activity modifications, joint protection, pain management, and injury pathology       Assessment:   Patient was able to tolerate all exercises and strengthening intervention today with low reactivity.  Continues to have increased pain with AROM beyond 90 degrees of bilateral shoulder flexion.  He requires extended periods of rest between sets for active recovery.      Plan:     Planned Interventions include: therapeutic exercise, self-care home management, manual therapy, therapeutic activities, , neuromuscular coordination, vasopneumatic, dry needling, and orthosis fabrication.  Frequency: 1-2 x Week  Duration: 12 Weeks    Goals: Set and discussed today         1) Patient to demo at least 120 degrees of B shoulder flexion to complete overhead dressing tasks with SUP, in 8 weeks.         2) Patient to demo 4-/5 MMT of B shoulder flexion required to reach into cabinets for home mgt tasks, in 8 weeks         3) Patient report no more than 2-3 sleep disturbances in a week as a result of reduced shoulder pain, in 6 weeks.         4) Pt will be able to perform toilet hygiene with no more than 4/10 R shoulder pain, in 8 weeks.        Plan of care was developed with input and agreement by the patient.      Siddharth Pimentel OT

## 2024-04-30 ENCOUNTER — TREATMENT (OUTPATIENT)
Dept: OCCUPATIONAL THERAPY | Facility: CLINIC | Age: 69
End: 2024-04-30
Payer: MEDICARE

## 2024-04-30 DIAGNOSIS — M19.011 PRIMARY OSTEOARTHRITIS OF BOTH SHOULDERS: ICD-10-CM

## 2024-04-30 DIAGNOSIS — M19.012 PRIMARY OSTEOARTHRITIS OF BOTH SHOULDERS: ICD-10-CM

## 2024-04-30 DIAGNOSIS — G89.29 CHRONIC PAIN OF BOTH SHOULDERS: ICD-10-CM

## 2024-04-30 DIAGNOSIS — M25.511 CHRONIC PAIN OF BOTH SHOULDERS: ICD-10-CM

## 2024-04-30 DIAGNOSIS — M25.512 CHRONIC PAIN OF BOTH SHOULDERS: ICD-10-CM

## 2024-04-30 PROCEDURE — 97110 THERAPEUTIC EXERCISES: CPT | Mod: GO | Performed by: OCCUPATIONAL THERAPIST

## 2024-04-30 NOTE — PROGRESS NOTES
Occupational Therapy Orthopedic Treatment Note    Patient Name: Gen Solis  MRN: [id number]  Today's Date: 4/30/2024  Time Calculation  Start Time: 0215  Stop Time: 0257  Time Calculation (min): 42 min    Insurance:  Visit number: 7 of 10  Authorization info:  Required  Insurance Type: [employer]  Authorization Certification Period -  Start: 4/5/2024   End: 6/28/24    Current Problem  1. Primary osteoarthritis of both shoulders  Follow Up In Occupational Therapy      2. Chronic pain of both shoulders  Follow Up In Occupational Therapy    Requisition for physical therapy placed.          Referred by: Pierce Leonard DO  Referred for:  B shoulder pain.OA  Onset/DOI: 3/21/24    Fall risk:  none  Precautions:  none       Subjective         Hand Dominance: Right    Pain:  R shoulder 7/10  L shoulder 4/10  Location: B proximal shoulder/proximal humerus  Description: stabbing, aching, localized      Objective   SHOULDER AROM (Degrees)  Flexion R L   Flexion 90 (A) 100  (A)   Abduction 70 (A) 55  (A)   IR at 75 degrees abd 50 (A) 40  (A)   Er at 75 degrees abd 45 (A) 30  (A)     SHOULDER STRENGTH (MMT)  Flexion R L   Flexion 3-   pain and crepitus 3+   Extension 5 5   Abduction 3 3   IR at 0 degrees abd 5 5   ER at 0 degrees abd 3- 3+       Outcome Measures:         QUICK DASH:  47.73%                   Treatment Today    Therapeutic ex  - supine B shoulder ADD isometrics with ball at side  to increase strength 5sec x 20  - B light tband ER to increase RTC strength 3x10  - B shoulder ABD isometrics to increase RTC strength 5sec x 10 each  - B shoulder flexion isometric with ball on wall to increase strength 10sec x 10  - B shoulder extension with light tband to increase strength 4x10  - B shoulder extension isometrics with tband to increase posterior chain strength x 10min  - B shoulder rows with heavy tband to increase bicep strength 3x10  - B shoulder flexion rhythmic stabilization at midline with Body Blade 20sec x  10sets    EDUCATION: Home exercise program, plan of care, activity modifications, joint protection, pain management, and injury pathology       Assessment:   Patient was able to tolerate all exercises and strengthening intervention today with low reactivity.   He did not require extended periods of rest between sets for active recovery.  He was able to work around his pain tolerances with gentle resistance today without going beyond a subjective 3/10 pain rating.  This successfully contributed to less need for extended rest periods.      Plan:     Planned Interventions include: therapeutic exercise, self-care home management, manual therapy, therapeutic activities, , neuromuscular coordination, vasopneumatic, dry needling, and orthosis fabrication.  Frequency: 1-2 x Week  Duration: 12 Weeks    Goals: Set and discussed today         1) Patient to demo at least 120 degrees of B shoulder flexion to complete overhead dressing tasks with SUP, in 8 weeks.         2) Patient to demo 4-/5 MMT of B shoulder flexion required to reach into cabinets for home mgt tasks, in 8 weeks         3) Patient report no more than 2-3 sleep disturbances in a week as a result of reduced shoulder pain, in 6 weeks.         4) Pt will be able to perform toilet hygiene with no more than 4/10 R shoulder pain, in 8 weeks.        Plan of care was developed with input and agreement by the patient.      Siddharth Pimentel, OT

## 2024-05-03 ENCOUNTER — APPOINTMENT (OUTPATIENT)
Dept: OCCUPATIONAL THERAPY | Facility: CLINIC | Age: 69
End: 2024-05-03
Payer: MEDICARE

## 2024-05-06 ENCOUNTER — APPOINTMENT (OUTPATIENT)
Dept: OCCUPATIONAL THERAPY | Facility: CLINIC | Age: 69
End: 2024-05-06
Payer: MEDICARE

## 2024-05-14 ENCOUNTER — TREATMENT (OUTPATIENT)
Dept: OCCUPATIONAL THERAPY | Facility: CLINIC | Age: 69
End: 2024-05-14
Payer: MEDICARE

## 2024-05-14 DIAGNOSIS — M19.012 PRIMARY OSTEOARTHRITIS OF BOTH SHOULDERS: Primary | ICD-10-CM

## 2024-05-14 DIAGNOSIS — M25.512 CHRONIC PAIN OF BOTH SHOULDERS: ICD-10-CM

## 2024-05-14 DIAGNOSIS — G89.29 CHRONIC PAIN OF BOTH SHOULDERS: ICD-10-CM

## 2024-05-14 DIAGNOSIS — M19.011 PRIMARY OSTEOARTHRITIS OF BOTH SHOULDERS: Primary | ICD-10-CM

## 2024-05-14 DIAGNOSIS — M25.511 CHRONIC PAIN OF BOTH SHOULDERS: ICD-10-CM

## 2024-05-14 PROCEDURE — 97110 THERAPEUTIC EXERCISES: CPT | Mod: GO | Performed by: OCCUPATIONAL THERAPIST

## 2024-05-14 NOTE — PROGRESS NOTES
"    Occupational Therapy Orthopedic Treatment Note    Patient Name: Gen Solis  MRN: [id number]  Today's Date: 5/14/2024       Insurance:  Visit number: 8 of 10  Authorization info:  Required  Insurance Type: [employer]  Authorization Certification Period -  Start: 4/5/2024   End: 6/28/24    Current Problem  1. Primary osteoarthritis of both shoulders            Referred by: Pierce Leonard DO  Referred for:  B shoulder pain.OA  Onset/DOI: 3/21/24    Fall risk:  none  Precautions:  none       Subjective   \"My shoulders dont feel as bad when Im not doing anything.  I dont think the therapy is helping much to be honest.\"      Hand Dominance: Right    Pain:  R shoulder 7/10  L shoulder 4/10  Location: B proximal shoulder/proximal humerus  Description: stabbing, aching, localized      Objective   SHOULDER AROM (Degrees)  Flexion R L   Flexion 90 (A) 100  (A)   Abduction 70 (A) 55  (A)   IR at 75 degrees abd 50 (A) 40  (A)   Er at 75 degrees abd 45 (A) 30  (A)     SHOULDER STRENGTH (MMT)  Flexion R L   Flexion 3-   pain and crepitus 3+   Extension 5 5   Abduction 3 3   IR at 0 degrees abd 5 5   ER at 0 degrees abd 3- 3+       Outcome Measures:         QUICK DASH:  47.73%                   Treatment Today    Therapeutic ex  - supine B shoulder ADD isometrics with ball at side  to increase strength 5sec x 20  - B light tband ER to increase RTC strength 3x10  - B shoulder ABD isometrics to increase RTC strength 5sec x 10 each  - B shoulder flexion isometric with ball on wall to increase strength 10sec x 10  - B shoulder extension with light tband to increase strength 4x10  - B shoulder extension isometrics with tband to increase posterior chain strength x 10min  - B shoulder rows with heavy tband to increase bicep strength 3x10  - B shoulder flexion rhythmic stabilization at midline with Body Blade 20sec x 10sets    EDUCATION: Home exercise program, plan of care, activity modifications, joint protection, pain management, " and injury pathology       Assessment:   Patient was able to tolerate all exercises and strengthening intervention today without increased pain.  Despite being able  to complete all gentle strengthening his overhead and lateral ROM remains quite limited.  L UE flexion remains similar to evaluation measures however R shoulder flexion is improved from 90 to 110 since evaluation.    Plan:     Planned Interventions include: therapeutic exercise, self-care home management, manual therapy, therapeutic activities, , neuromuscular coordination, vasopneumatic, dry needling, and orthosis fabrication.  Frequency: 1-2 x Week  Duration: 12 Weeks    Goals: Set and discussed today         1) Patient to demo at least 120 degrees of B shoulder flexion to complete overhead dressing tasks with SUP, in 8 weeks.         2) Patient to demo 4-/5 MMT of B shoulder flexion required to reach into cabinets for home mgt tasks, in 8 weeks         3) Patient report no more than 2-3 sleep disturbances in a week as a result of reduced shoulder pain, in 6 weeks.         4) Pt will be able to perform toilet hygiene with no more than 4/10 R shoulder pain, in 8 weeks.        Plan of care was developed with input and agreement by the patient.      Siddharth Pimentel, OT

## 2024-05-15 ENCOUNTER — TELEPHONE (OUTPATIENT)
Dept: PRIMARY CARE | Facility: CLINIC | Age: 69
End: 2024-05-15
Payer: MEDICARE

## 2024-05-15 NOTE — TELEPHONE ENCOUNTER
Was going to therapy and insurance now only will allow once every two weeks.  He is no better.  What is next?

## 2024-05-23 DIAGNOSIS — E78.5 HYPERLIPIDEMIA, UNSPECIFIED HYPERLIPIDEMIA TYPE: ICD-10-CM

## 2024-05-24 RX ORDER — SIMVASTATIN 20 MG/1
20 TABLET, FILM COATED ORAL DAILY
Qty: 90 TABLET | Refills: 3 | Status: SHIPPED | OUTPATIENT
Start: 2024-05-24

## 2024-05-28 ENCOUNTER — TREATMENT (OUTPATIENT)
Dept: OCCUPATIONAL THERAPY | Facility: CLINIC | Age: 69
End: 2024-05-28
Payer: MEDICARE

## 2024-05-28 DIAGNOSIS — M19.012 PRIMARY OSTEOARTHRITIS OF BOTH SHOULDERS: Primary | ICD-10-CM

## 2024-05-28 DIAGNOSIS — G89.29 CHRONIC PAIN OF BOTH SHOULDERS: ICD-10-CM

## 2024-05-28 DIAGNOSIS — M19.011 PRIMARY OSTEOARTHRITIS OF BOTH SHOULDERS: Primary | ICD-10-CM

## 2024-05-28 DIAGNOSIS — M25.511 CHRONIC PAIN OF BOTH SHOULDERS: ICD-10-CM

## 2024-05-28 DIAGNOSIS — M25.512 CHRONIC PAIN OF BOTH SHOULDERS: ICD-10-CM

## 2024-05-28 PROCEDURE — 97110 THERAPEUTIC EXERCISES: CPT | Mod: GO | Performed by: OCCUPATIONAL THERAPIST

## 2024-05-28 NOTE — PROGRESS NOTES
"    Occupational Therapy Orthopedic Treatment Note and Discharge    Patient Name: Gen Solis  MRN: [id number]  Today's Date: 5/28/2024  Time Calculation  Start Time: 0135  Stop Time: 0205  Time Calculation (min): 30 min    Insurance:  Visit number: 9 of 10  Authorization info:  Required  Insurance Type: [employer]  Authorization Certification Period -  Start: 4/5/2024   End: 6/28/24    Current Problem  1. Primary osteoarthritis of both shoulders            Referred by: Pierce Leonard DO  Referred for:  B shoulder pain.OA  Onset/DOI: 3/21/24    Fall risk:  none  Precautions:  none       Subjective   \"My shoulders are the same.\"    Hand Dominance: Right    Pain:  R shoulder 7/10  L shoulder 4/10  Location: B proximal shoulder/proximal humerus  Description: stabbing, aching, localized      Objective   SHOULDER AROM (Degrees)  Flexion R L   Flexion 90 (A) 100  (A)   Abduction 70 (A) 55  (A)   IR at 75 degrees abd 50 (A) 40  (A)   Er at 75 degrees abd 45 (A) 30  (A)     SHOULDER STRENGTH (MMT)  Flexion R L   Flexion 3-   pain and crepitus 3+   Extension 5 5   Abduction 3 3   IR at 0 degrees abd 5 5   ER at 0 degrees abd 3- 3+       Outcome Measures:         QUICK DASH:  47.73%                   Treatment Today    Therapeutic ex  - supine B shoulder ADD isometrics with ball at side  to increase strength 5sec x 20  - B light tband ER to increase RTC strength 3x10  - B shoulder ABD isometrics to increase RTC strength 5sec x 10 each  - B shoulder flexion isometric with ball on wall to increase strength 10sec x 10  - B shoulder extension with light tband to increase strength 4x10  - B shoulder extension isometrics with tband to increase posterior chain strength x 10min  - B shoulder rows with heavy tband to increase bicep strength 3x10      EDUCATION: Home exercise program, plan of care, activity modifications, joint protection, pain management, and injury pathology       Assessment:   Despite being able  to complete all " gentle strengthening his overhead and lateral ROM remains quite limited.  L UE flexion remains similar to evaluation measures however R shoulder flexion is improved from 90 to 110 since evaluation.  It was determined today that patient has achieved max functional benefit from this plan of care.  He will return to his doctor to discus further treatment options and continue progressing with his HEP.    Plan:     Discharge to home with HEP    Goals: Set and discussed today         1) Patient to demo at least 120 degrees of B shoulder flexion to complete overhead dressing tasks with SUP, in 8 weeks.   PARTIALLY MET         2) Patient to demo 4-/5 MMT of B shoulder flexion required to reach into cabinets for home mgt tasks, in 8 weeks   PARTIALLY MET         3) Patient report no more than 2-3 sleep disturbances in a week as a result of reduced shoulder pain, in 6 weeks.   PARTIALLY MET         4) Pt will be able to perform toilet hygiene with no more than 4/10 R shoulder pain, in 8 weeks.   PARTIALLY MET        Plan of care was developed with input and agreement by the patient.      Siddharth Pimentel, OT

## 2024-06-20 ENCOUNTER — HOSPITAL ENCOUNTER (OUTPATIENT)
Dept: RADIOLOGY | Facility: HOSPITAL | Age: 69
Discharge: HOME | End: 2024-06-20
Payer: MEDICARE

## 2024-06-20 DIAGNOSIS — M25.511 PAIN IN RIGHT SHOULDER: ICD-10-CM

## 2024-06-20 PROCEDURE — 73221 MRI JOINT UPR EXTREM W/O DYE: CPT | Mod: RT

## 2024-07-08 ENCOUNTER — HOSPITAL ENCOUNTER (OUTPATIENT)
Facility: HOSPITAL | Age: 69
Setting detail: OUTPATIENT SURGERY
End: 2024-07-08
Attending: ORTHOPAEDIC SURGERY | Admitting: ORTHOPAEDIC SURGERY
Payer: MEDICARE

## 2024-07-19 DIAGNOSIS — F32.A DEPRESSION, UNSPECIFIED DEPRESSION TYPE: Primary | ICD-10-CM

## 2024-07-22 ENCOUNTER — HOSPITAL ENCOUNTER (OUTPATIENT)
Dept: RADIOLOGY | Facility: HOSPITAL | Age: 69
Discharge: HOME | End: 2024-07-22
Payer: MEDICARE

## 2024-07-22 DIAGNOSIS — R52 PAIN: ICD-10-CM

## 2024-07-22 DIAGNOSIS — M19.011 PRIMARY OSTEOARTHRITIS, RIGHT SHOULDER: ICD-10-CM

## 2024-07-22 PROCEDURE — 73200 CT UPPER EXTREMITY W/O DYE: CPT | Mod: RT

## 2024-07-22 PROCEDURE — 76377 3D RENDER W/INTRP POSTPROCES: CPT

## 2024-07-22 PROCEDURE — 76377 3D RENDER W/INTRP POSTPROCES: CPT | Mod: RIGHT SIDE | Performed by: RADIOLOGY

## 2024-07-22 PROCEDURE — 73200 CT UPPER EXTREMITY W/O DYE: CPT | Mod: RIGHT SIDE | Performed by: RADIOLOGY

## 2024-07-23 ENCOUNTER — LAB (OUTPATIENT)
Dept: LAB | Facility: LAB | Age: 69
End: 2024-07-23
Payer: MEDICARE

## 2024-07-23 ENCOUNTER — PRE-ADMISSION TESTING (OUTPATIENT)
Dept: PREADMISSION TESTING | Facility: HOSPITAL | Age: 69
End: 2024-07-23
Payer: MEDICARE

## 2024-07-23 VITALS
TEMPERATURE: 97.6 F | HEIGHT: 71 IN | WEIGHT: 315 LBS | OXYGEN SATURATION: 92 % | SYSTOLIC BLOOD PRESSURE: 170 MMHG | RESPIRATION RATE: 16 BRPM | DIASTOLIC BLOOD PRESSURE: 84 MMHG | BODY MASS INDEX: 44.1 KG/M2 | HEART RATE: 76 BPM

## 2024-07-23 DIAGNOSIS — Z01.818 PREOP TESTING: Primary | ICD-10-CM

## 2024-07-23 DIAGNOSIS — R73.9 ELEVATED BLOOD SUGAR: ICD-10-CM

## 2024-07-23 DIAGNOSIS — Z01.818 PREOP TESTING: ICD-10-CM

## 2024-07-23 LAB
ALBUMIN SERPL BCP-MCNC: 4.2 G/DL (ref 3.4–5)
ALP SERPL-CCNC: 36 U/L (ref 33–136)
ALT SERPL W P-5'-P-CCNC: 61 U/L (ref 10–52)
ANION GAP SERPL CALC-SCNC: 12 MMOL/L (ref 10–20)
AST SERPL W P-5'-P-CCNC: 41 U/L (ref 9–39)
ATRIAL RATE: 83 BPM
BASOPHILS # BLD AUTO: 0.06 X10*3/UL (ref 0–0.1)
BASOPHILS NFR BLD AUTO: 0.9 %
BILIRUB SERPL-MCNC: 1.2 MG/DL (ref 0–1.2)
BUN SERPL-MCNC: 14 MG/DL (ref 6–23)
CALCIUM SERPL-MCNC: 9.6 MG/DL (ref 8.6–10.3)
CHLORIDE SERPL-SCNC: 99 MMOL/L (ref 98–107)
CO2 SERPL-SCNC: 28 MMOL/L (ref 21–32)
CREAT SERPL-MCNC: 1.04 MG/DL (ref 0.5–1.3)
EGFRCR SERPLBLD CKD-EPI 2021: 79 ML/MIN/1.73M*2
EOSINOPHIL # BLD AUTO: 0.24 X10*3/UL (ref 0–0.7)
EOSINOPHIL NFR BLD AUTO: 3.6 %
ERYTHROCYTE [DISTWIDTH] IN BLOOD BY AUTOMATED COUNT: 15.1 % (ref 11.5–14.5)
EST. AVERAGE GLUCOSE BLD GHB EST-MCNC: 171 MG/DL
GLUCOSE SERPL-MCNC: 160 MG/DL (ref 74–99)
HBA1C MFR BLD: 7.6 %
HCT VFR BLD AUTO: 56.7 % (ref 41–52)
HGB BLD-MCNC: 18 G/DL (ref 13.5–17.5)
IMM GRANULOCYTES # BLD AUTO: 0.01 X10*3/UL (ref 0–0.7)
IMM GRANULOCYTES NFR BLD AUTO: 0.2 % (ref 0–0.9)
LYMPHOCYTES # BLD AUTO: 1.41 X10*3/UL (ref 1.2–4.8)
LYMPHOCYTES NFR BLD AUTO: 21.3 %
MCH RBC QN AUTO: 26.9 PG (ref 26–34)
MCHC RBC AUTO-ENTMCNC: 31.7 G/DL (ref 32–36)
MCV RBC AUTO: 85 FL (ref 80–100)
MONOCYTES # BLD AUTO: 0.6 X10*3/UL (ref 0.1–1)
MONOCYTES NFR BLD AUTO: 9.1 %
NEUTROPHILS # BLD AUTO: 4.29 X10*3/UL (ref 1.2–7.7)
NEUTROPHILS NFR BLD AUTO: 64.9 %
NRBC BLD-RTO: ABNORMAL /100{WBCS}
P AXIS: 55 DEGREES
P OFFSET: 200 MS
P ONSET: 146 MS
PLATELET # BLD AUTO: 253 X10*3/UL (ref 150–450)
POTASSIUM SERPL-SCNC: 4.4 MMOL/L (ref 3.5–5.3)
PR INTERVAL: 150 MS
PROT SERPL-MCNC: 6.8 G/DL (ref 6.4–8.2)
Q ONSET: 221 MS
QRS COUNT: 13 BEATS
QRS DURATION: 80 MS
QT INTERVAL: 372 MS
QTC CALCULATION(BAZETT): 437 MS
QTC FREDERICIA: 414 MS
R AXIS: 14 DEGREES
RBC # BLD AUTO: 6.68 X10*6/UL (ref 4.5–5.9)
SODIUM SERPL-SCNC: 135 MMOL/L (ref 136–145)
T AXIS: 71 DEGREES
T OFFSET: 407 MS
VENTRICULAR RATE: 83 BPM
WBC # BLD AUTO: 6.6 X10*3/UL (ref 4.4–11.3)

## 2024-07-23 PROCEDURE — 87081 CULTURE SCREEN ONLY: CPT | Mod: BEALAB

## 2024-07-23 PROCEDURE — 85025 COMPLETE CBC W/AUTO DIFF WBC: CPT

## 2024-07-23 PROCEDURE — 83036 HEMOGLOBIN GLYCOSYLATED A1C: CPT

## 2024-07-23 PROCEDURE — 93005 ELECTROCARDIOGRAM TRACING: CPT

## 2024-07-23 PROCEDURE — 36415 COLL VENOUS BLD VENIPUNCTURE: CPT

## 2024-07-23 PROCEDURE — 93010 ELECTROCARDIOGRAM REPORT: CPT | Performed by: INTERNAL MEDICINE

## 2024-07-23 PROCEDURE — 80053 COMPREHEN METABOLIC PANEL: CPT

## 2024-07-23 RX ORDER — CHLORHEXIDINE GLUCONATE ORAL RINSE 1.2 MG/ML
15 SOLUTION DENTAL AS NEEDED
Qty: 30 ML | Refills: 0 | Status: SHIPPED | OUTPATIENT
Start: 2024-07-23

## 2024-07-23 ASSESSMENT — PAIN - FUNCTIONAL ASSESSMENT: PAIN_FUNCTIONAL_ASSESSMENT: 0-10

## 2024-07-23 ASSESSMENT — PAIN SCALES - GENERAL: PAINLEVEL_OUTOF10: 7

## 2024-07-23 NOTE — CPM/PAT H&P
Patient is an alert and oriented 69 year old male scheduled for a  Right Shoulder Anatomic Total Shoulder Arthroplasty  on 8/1/2024 with Dr. Belle for  Arthritis Right Shoulder Region.  The patient reports 7/10 intermittent sharp achy shoulder pain that is exacerbated with movement and reaching.  He states no movement helps.  He denies and numbness or tingling in his arm.  Does have some weakness.  He has had injections and physical therapy in the past.     PMHX includes Anxiety, HTN, Low Testosterone.      [place] [facility] [place] for perioperative risk stratification and optimization.      Review of Systems   Constitutional: Negative for chills, decreased appetite, diaphoresis, fever and malaise/fatigue.   Eyes:  Negative for blurred vision and double vision.   Cardiovascular:  Negative for chest pain, claudication, cyanosis, dyspnea on exertion, irregular heartbeat, leg swelling, near-syncope and palpitations.   Respiratory:  Negative for cough, hemoptysis, shortness of breath and wheezing.    Endocrine: Negative for cold intolerance, heat intolerance, polydipsia, polyphagia and polyuria.   Gastrointestinal:  Negative for abdominal pain, constipation, diarrhea, dysphagia, nausea and vomiting.   Genitourinary:  Negative for bladder incontinence, dysuria, hematuria, incomplete emptying, nocturia, pelvic pain and urgency.   Neurological:  Negative for headaches, light-headedness, paresthesias, sensory change and weakness.   Psychiatric/Behavioral:  Negative for altered mental status.       Vitals and nursing note reviewed.     Physical exam  Constitutional:       Appearance: Normal appearance. He is normal weight.   HENT:      Head: Normocephalic and atraumatic.      Mouth/Throat:      Mouth: Mucous membranes are moist.      Pharynx: Oropharynx is clear.   Eyes:      Extraocular Movements: Extraocular movements intact.      Conjunctiva/sclera: Conjunctivae normal.      Pupils: Pupils are equal, round, and reactive  to light.   Cardiovascular:      Rate and Rhythm: Normal rate and regular rhythm.      Pulses: Normal pulses.      Heart sounds: Normal heart sounds.      No audible carotid bruit  Pulmonary:      Effort: Pulmonary effort is normal.      Breath sounds: Normal breath sounds.   Abdominal:      General: Abdomen is flat. Bowel sounds are normal.      Palpations: Abdomen is soft.   Musculoskeletal:      Cervical back: Normal range of motion and neck supple.   Skin:     General: Skin is warm and dry.      Capillary Refill: Capillary refill takes less than 2 seconds.   Neurological:      General: No focal deficit present.      Mental Status: He is alert and oriented to person, place, and time. Mental status is at baseline.   Psychiatric:         Mood and Affect: Mood normal.         Behavior: Behavior normal.         Thought Content: Thought content normal.         Judgment: Judgment normal.     Vitals and nursing note reviewed. Physical exam within normal limits.   Vitals:    07/23/24 0748   BP: 170/84   Pulse: 76   Resp: 16   Temp: 36.4 °C (97.6 °F)   SpO2: 92%       Assessment & Plan:    Neuro:  No diagnosis or significant findings on chart review or clinical presentation and evaluation.     Anxiety  Managed with lexapro 10mg daily  HEENT/Airway:  No diagnosis or significant findings on chart review or clinical presentation and evaluation.   STOP-BANG Score 6    Mallampati::  IV    TM distance::  >2 FB    Neck ROM::  Full  Mouth Opening 3  No dentures, caps, or dental implants  - has missing teeth    Cardiovascular:  Hypertension  Managed with olmesartan 40mg daily    Hyperlipidemia  Managed with simvastatin 20mg daily   METS: 7.01  RCRI: 0 points, 3.9%  risk for postoperative MACE   GABRIELA: 0.2% risk for postoperative MACE  EKG - NSR rate of 83    Pulmonary:  No diagnosis or significant findings on chart review or clinical presentation and evaluation.   ARISCAT: <26 points, 1.6% risk of in-hospital postoperative  pulmonary complication  PRODIGY: High risk for opioid induced respiratory depression  Smoking History-quit 27 yrs ago smoked 2 ppd x 15 years  deep breathing handout given    Renal:  No diagnosis or significant findings on chart review or clinical presentation and evaluation, however, the patient is at increased risk of perioperative renal complications secondary to age>/= 58, male sex, and HTN. Preventative measures include  CMP ordered     Endocrine:  Low testosterone   Managed with daily testosterone    Hematologic:  CBC ordered   Caprini Score 4, patient at Moderate risk for postoperative DVT. Pt supplied education/VTE handout    Gastrointestinal:   No diagnosis or significant findings on chart review or clinical presentation and evaluation.   Alcohol use-social  Drug use-none    Infectious disease:   No diagnosis or significant findings on chart review or clinical presentation and evaluation.   Chlorhexidine 0.12% mouthwash sent to the pharmacy for the patient  Staph screen collected by nursing    Musculoskeletal:   Arthritis Right Shoulder Region  Right shoulder Anatomic Total Shoulder Arthroplasty   JHFRAT score  6 low falls risk    Obesity  BMI in PAT today 46.96 - Dr. Evans and Dr. Belle notified    Anesthesia:  No anesthesia complications  Family history of anesthesia complications none    Labs & Imaging ordered:  CBC, CMP, A1C, MRSA, EKG  Nickel/metal allergy- none  Shellfish allergy-none    Will notify anesthesia and Dr. Belle that the patient's BMI is 46.96.  Patient will need to have procedure at another facility d/t BMI per Dr. Evans.  Dr. Belle is aware    Overall, patient at low risk for the scheduled surgery. Discussed with patient medication instructions, NPO guidelines, and any questions or concerns. Patient needs no further workup prior to preceding with elective surgery.     Dr. Belle notified of A1C of 7.6.  He has cancelled the patient's surgery.  Face to face time-30  ana Balderas CNP

## 2024-07-23 NOTE — PREPROCEDURE INSTRUCTIONS
Medication List            Accurate as of July 23, 2024  8:09 AM. Always use your most recent med list.                chlorhexidine 0.12 % solution  Commonly known as: Peridex  Use 15 mL in the mouth or throat if needed (pre operative 15ml swish and spit the night befor and morning of surgery) for up to 2 doses.  Notes to patient: Take as directed     escitalopram 10 mg tablet  Commonly known as: Lexapro  Medication Adjustments for Surgery: Take morning of surgery with sip of water, no other fluids     multivitamin with minerals iron-free  Commonly known as: Centrum Silver  Medication Adjustments for Surgery: Stop 7 days before surgery  Notes to patient: Last dose on 7/25/2024     olmesartan 40 mg tablet  Commonly known as: BENIcar  TAKE 1 TABLET EVERY DAY  Medication Adjustments for Surgery: Stop 1 day before surgery  Notes to patient: Hold 24 hours prior to surgery - last dose 7/31/2024 am dose     simvastatin 20 mg tablet  Commonly known as: Zocor  TAKE 1 TABLET EVERY DAY  Medication Adjustments for Surgery: Continue until night before surgery     testosterone 20.25 mg/1.25 gram (1.62 %) gel in metered-dose pump  use 2 pumps EVERY DAY AS DIRECTED  Medication Adjustments for Surgery: Take morning of surgery with sip of water, no other fluids     TylenoL 325 mg tablet  Generic drug: acetaminophen  Medication Adjustments for Surgery: Take morning of surgery with sip of water, no other fluids            NPO Instructions:    Do not eat any food after midnight the night before your surgery/procedure.  You may have clear liquids until TWO hours before surgery/procedure. This includes water, black tea/coffee, (no milk or cream) apple juice and electrolyte drinks (Gatorade).  You may chew gum up to TWO hours before your surgery/procedure.    Additional Instructions:     Seven/Six Days before Surgery:  Review your medication instructions, stop indicated medications  Five Days before Surgery:  Review your medication  instructions, stop indicated medications  Begin using your Hibiclens  Three Days before Surgery:  Review your medication instructions, stop indicated medications  The Day before Surgery:  Review your medication instructions, stop indicated medications  You will be contacted regarding the time of your arrival to facility and surgery time  Do not eat any food after Midnight  Day of Surgery:  Review your medication instructions, take indicated medications  You may have clear liquids until TWO hours before surgery/procedure.  This includes water, black tea/coffee, (no milk or cream) apple juice and electrolyte drinks (Gatorade)  You may chew gum up to TWO hours before your surgery/procedure  Wear  comfortable loose fitting clothing  Do not use moisturizers, creams, lotions or perfume  All jewelry and valuables should be left at home  CONTACT SURGEON'S OFFICE IF YOU DEVELOP:  * Fever = 100.4 F   * New respiratory symptoms (e.g. cough, shortness of breath, respiratory distress, sore throat)  * Recent loss of taste or smell  *Flu like symptoms such as headache, fatigue or gastrointestinal symptoms  * You develop any open sores, shingles, burning or painful urination   AND/OR:  * You no longer wish to have the surgery.  * Any other personal circumstances change that may lead to the need to cancel or defer this surgery.  *You were admitted to any hospital within one week of your planned procedure.    SMOKING:  *Quitting smoking can make a huge difference to your health and recovery from surgery.    *If you need help with quitting, call 8-590-QUIT-NOW.    THE DAY BEFORE SURGERY:  *Do not eat any food after midnight the night before your surgery.   *You may have up to 13.5 OUNCES of clear liquids until TWO hours before your instructed ARRIVAL TIME to hospital. This includes water, black tea/coffee, (no milk or cream) apple juice, clear broth and electrolyte drinks (Gatorade). Please avoid clear liquids that are red in color.    *You may chew gum/mints up to TWO hours before your surgery/procedure.    SURGICAL TIME:  *You will be contacted between 2 p.m. and 3 p.m. the business day before your surgery with your arrival time.  *If you haven't received a call by 3pm, call [phone]  *Scheduled surgery times may change and you will be notified if this occurs-check your personal voicemail for any updates.    ON THE MORNING OF SURGERY:  *Wear comfortable, loose fitting clothing.   *Do not use moisturizers, creams, lotions or perfume.  *All jewelry and valuables should be left at home.  *Prosthetic devices such as contact lenses, hearing aids, dentures, eyelash extensions, hairpins and body piercing must be removed before surgery.    BRING WITH YOU:  *Photo ID and insurance card  *Current list of medications and allergies  *Pacemaker/Defibrillator/Heart stent cards  *CPAP machine and mask  *Slings/splints/crutches  *Copy of your complete Advanced Directive/DHPOA-if applicable  *Neurostimulator implant remote    PARKING AND ARRIVAL:  *Check in at the Main Entrance desk and let them know you are here for surgery.    IF YOU ARE HAVING OUTPATIENT/SAME DAY SURGERY:  *A responsible adult MUST accompany you at the time of discharge and stay with you for 24 hours after your surgery.  *You may NOT drive yourself home after surgery.  *You may use a taxi or ride sharing service ([employer], [employer]) to return home ONLY if you are accompanied by a friend or family member.  *Instructions for resuming your medications will be provided by your surgeon.    Thank you for coming to Pre Admission testing.     If I have prescribe medication please don't forget to  at your pharmacy.     Any questions about today's visit call [phone] and leave a message in the general mailbox.    Patient instructed to ambulate as soon as possible postoperatively to decrease thromboembolic risk.    Lainey Balderas, APRN-CNP    Thank you for visiting [facility].  If you have any changes to your health condition, please call the surgeons office to alert them and give them details of your symptoms.        Preoperative Fasting Guidelines    Why must I stop eating and drinking near surgery time?  With sedation, food or liquid in your stomach can enter your lungs causing serious complications  Increases nausea and vomiting    When do I need to stop eating and drinking before my surgery?  Do not eat any food after midnight the night before your surgery/procedure.  You may have up to 13.5 ounces of clear liquid until TWO hours before your instructed arrival time to the hospital.  This includes water, black tea/coffee, (no milk or cream) apple juice, and electrolyte drinks (Gatorade)  You may chew gum until TWO hours before your surgery/procedure      Additional Instructions:     The Day before Surgery:  -Review your medication instructions, stop indicated medications  -You will be contacted in the evening regarding the time of your arrival to facility and surgery time    Day of Surgery:  -Review your medication instructions, take indicated medications  -Wear comfortable loose fitting clothing  -Do not use moisturizers, creams, lotions or perfume  -All jewelry and valuables should be left at home              Preoperative Brain Exercises    What are brain exercises?  A brain exercise is any activity that engages your thinking (cognitive) skills.    What types of activities are considered brain exercises?  Jigsaw puzzles, crossword puzzles, word jumble, memory games, word search, and many more.  Many can be found free online or on your phone via a mobile winsome.    Why should I do brain exercises before my surgery?  More recent research has shown brain exercise before surgery can lower the risk of postoperative delirium (confusion) which can be especially important for older adults.  Patients who did brain exercises for 5 to 10 hours the days before surgery, cut their risk of  postoperative delirium in half up to 1 week after surgery.                  [facility]    Preoperative Deep Breathing Exercises    Why it is important to do deep breathing exercises before my surgery?  Deep breathing exercises strengthen your breathing muscles.  This helps you to recover after your surgery and decreases the chance of breathing complications.      How are the deep breathing exercises done?  Sit straight with your back supported.  Breathe in deeply and slowly through your nose. Your lower rib cage should expand and your abdomen may move forward.  Hold that breath for 3 to 5 seconds.  Breathe out through pursed lips, slowly and completely.  Rest and repeat 10 times every hour while awake.  Rest longer if you become dizzy or lightheaded.                [facility]    Preoperative Deep Breathing Exercises    Why it is important to do deep breathing exercises before my surgery?  Deep breathing exercises strengthen your breathing muscles.  This helps you to recover after your surgery and decreases the chance of breathing complications.      How are the deep breathing exercises done?  Sit straight with your back supported.  Breathe in deeply and slowly through your nose. Your lower rib cage should expand and your abdomen may move forward.  Hold that breath for 3 to 5 seconds.  Breathe out through pursed lips, slowly and completely.  Rest and repeat 10 times every hour while awake.  Rest longer if you become dizzy or lightheaded.      Patient Information: Incentive Spirometer  What is an incentive spirometer?  An incentive spirometer is a device used before and after surgery to “exercise” your lungs.  It helps you to take deeper breaths to expand your lungs.  Below is an example of a basic incentive spirometer.  The device you receive may differ slightly but they all function the same.    Why do I need to use an incentive spirometer?  Using your incentive  spirometer prepares your lungs for surgery and helps prevent lung problems after surgery.  How do I use my incentive spirometer?  When you're using your incentive spirometer, make sure to breathe through your mouth. If you breathe through your nose, the incentive spirometer won't work properly. You can hold your nose if you have trouble.  If you feel dizzy at any time, stop and rest. Try again at a later time.  Follow the steps below:  Set up your incentive spirometer, expand the flexible tubing and connect to the outlet.  Sit upright in a chair or bed. Hold the incentive spirometer at eye level.   Put the mouthpiece in your mouth and close your lips tightly around it. Slowly breathe out (exhale) completely.  Breathe in (inhale) slowly through your mouth as deeply as you can. As you take a breath, you will see the piston rise inside the large column. While the piston rises, the indicator should move upwards. It should stay in between the 2 arrows (see Figure).  Try to get the piston as high as you can, while keeping the indicator between the arrows.   If the indicator doesn't stay between the arrows, you're breathing either too fast or too slow.  When you get it as high as you can, hold your breath for 10 seconds, or as long as possible. While you're holding your breath, the piston will slowly fall to the base of the spirometer.  Once the piston reaches the bottom of the spirometer, breathe out slowly through your mouth. Rest for a few seconds.  Repeat 10 times. Try to get the piston to the same level with each breath.  Repeat every hour while awake  You can carefully clean the outside of the mouthpiece with an alcohol wipe or soap and water.      Patient and Family Education             Ways You Can Help Prevent Blood Clots             This handout explains some simple things you can do to help prevent blood clots.      Blood clots are blockages that can form in the body's veins. When a blood clot forms in your  deep veins, it may be called a deep vein thrombosis, or DVT for short. Blood clots can happen in any part of the body where blood flows, but they are most common in the arms and legs. If a piece of a blood clot breaks free and travels to the lungs, it is called a pulmonary embolus (PE). A PE can be a very serious problem.         Being in the hospital or having surgery can raise your chances of getting a blood clot because you may not be well enough to move around as much as you normally do.         Ways you can help prevent blood clots in the hospital         Wearing SCDs. SCDs stands for Sequential Compression Devices.   SCDs are special sleeves that wrap around your legs  They attach to a pump that fills them with air to gently squeeze your legs every few minutes.   This helps return the blood in your legs to your heart.   SCDs should only be taken off when walking or bathing.   SCDs may not be comfortable, but they can help save your life.               Wearing compression stockings - if your doctor orders them. These special snug fitting stockings gently squeeze your legs to help blood flow.       Walking. Walking helps move the blood in your legs.   If your doctor says it is ok, try walking the halls at least   5 times a day. Ask us to help you get up, so you don't fall.      Taking any blood thinning medicines your doctor orders.        Page 1 of 2     [facility]; 3/23   Ways you can help prevent blood clots at home       Wearing compression stockings - if your doctor orders them. ? Walking - to help move the blood in your legs.       Taking any blood thinning medicines your doctor orders.      Signs of a blood clot or PE      Tell your doctor or nurse know right away if you have of the problems listed below.    If you are at home, seek medical care right away. Call 911 for chest pain or problems breathing.               Signs of a blood clot (DVT) - such as pain,  swelling, redness or warmth in  your arm or leg      Signs of a pulmonary embolism (PE) - such as chest     pain or feeling short of breath        Why did I have my nose, under my arms, and groin swabbed?  The purpose of the swab is to identify Staphylococcus aureus inside your nose or on your skin.  The swab was sent to the laboratory for culture.  A positive swab/culture for Staphylococcus aureus is called colonization or carriage.      What is Staphylococcus aureus?  Staphylococcus aureus, also known as “staph”, is a germ found on the skin or in the nose of healthy people.  Sometimes Staphylococcus aureus can get into the body and cause an infection.  This can be minor (such as pimples, boils, or other skin problems).  It might also be serious (such as a blood infection, pneumonia, or a surgical site infection).    What is Staphylococcus aureus colonization or carriage?  Colonization or carriage means that a person has the germ but is not sick from it.  These bacteria can be spread on the hands or when breathing or sneezing.    How is Staphylococcus aureus spread?  It is most often spread by close contact with a person or item that carries it.    What happens if my culture is positive for Staphylococcus aureus?  Your doctor/medical team will use this information to guide any antibiotic treatment which may be necessary.  Regardless of the culture results, we will clean the inside of your nose with a betadine swab just before you have your surgery.      Will I get an infection if I have Staphylococcus aureus in my nose or on my skin?  Anyone can get an infection with Staphylococcus aureus.  However, the best way to reduce your risk of infection is to follow the instructions provided to you for the use of your CHG soap and dental rinse.        Patient Information: Oral/Dental Rinse    What is oral/dental rinse?   It is a mouthwash. It is a way of cleaning the mouth with a germ-killing solution before your surgery.  The solution contains  chlorhexidine, commonly known as CHG.   It is used inside the mouth to kill a bacteria known as Staphylococcus aureus.  Let your doctor know if you are allergic to Chlorhexidine.    We have sent a prescription for CHG 0.12% mouthwash to your preferred pharmacy.  If you have not already, Please  your prescription and start using the day before before surgery.  Follow the instruction sheet provided to you at your CPM/PAT appointment. Please contact [facility] if you do not receive your CHG mouthwash prescription.     Why do I need to use CHG oral/dental rinse?  The CHG oral/dental rinse helps to kill a bacteria in your mouth known as Staphylococcus aureus.     This reduces the risk of infection at the surgical site.      Using your CHG oral/dental rinse  STEPS:  Use your CHG oral/dental rinse after you brush your teeth the night before (at bedtime) and the morning of your surgery.  Follow all directions on your prescription label.    Use the cap on the container to measure 15ml   Swish (gargle if you can) the mouthwash in your mouth for at least 30 seconds, (do not swallow) and spit out  After you use your CHG rinse, do not rinse your mouth with water, drink or eat.  Please refer to the prescription label for the appropriate time to resume oral intake      What side effects might I have using the CHG oral/dental rinse?  CHG rinse will stick to plaque on the teeth.  Brush and floss just before use.  Teeth brushing will help avoid staining of plaque during use.      Patient Information: Home Preoperative Antibacterial Shower      What is a home preoperative antibacterial shower?  This shower is a way of cleaning the skin with a germ-killing solution before surgery.  The solution contains chlorhexidine, commonly known as CHG.  CHG is a skin cleanser with germ-killing ability.  Let your doctor know if you are allergic to chlorhexidine.    Why do I need to take a preoperative antibacterial shower?  Skin is not sterile.   It is best to try to make your skin as free of germs as possible before surgery.  Proper cleansing with a germ-killing soap before surgery can lower the number of germs on your skin.  This helps to reduce the risk of infection at the surgical site.  Following the instructions listed below will help you prepare your skin for surgery.      How do I use the solution?  Steps:  Begin using your CHG soap 5 days before your scheduled surgery on ________________________.    First, wash and rinse your hair using the CHG soap. Keep CHG soap away from ear canals and eyes.  Rinse completely, do not condition.  Hair extensions should be removed.  Wash your face with your normal soap and rinse.    Apply the CHG solution to a clean wet washcloth.  Turn the water off or move away from the water spray to avoid premature rinsing of the CHG soap as you are applying.   Firmly lather your entire body from the neck down.  Do not use on your face.  Pay special attention to the area(s) where your incision(s) will be located unless they are on your face.  Avoid scrubbing your skin too hard.  The important point is to have the CHG soap sit on your skin for 3 minutes.    When the 3 minutes are up, turn on the water and rinse the CHG solution off your body completely.   DO NOT wash with regular soap after you have used the CHG soap solution  Pat yourself dry with a clean, freshly-laundered towel.  DO NOT apply powders, deodorants, or lotions.  Dress in clean, freshly laundered nightclothes.    Be sure to sleep with clean, freshly laundered sheets.  Be aware that CHG will cause stains on fabrics; if you wash them with bleach after use.  Rinse your washcloth and other linens that have contact with CHG completely.  Use only non-chlorine detergents to launder the items used.   The morning of surgery is the fifth day.  Repeat the above steps and dress in clean comfortable clothing     Whom should I contact if I have any questions regarding the use  of CHG soap?  Call the [facility] at [phone] if you have any questions.

## 2024-07-24 RX ORDER — ESCITALOPRAM OXALATE 10 MG/1
10 TABLET ORAL DAILY
Qty: 90 TABLET | Refills: 1 | Status: SHIPPED | OUTPATIENT
Start: 2024-07-24

## 2024-07-24 NOTE — TELEPHONE ENCOUNTER
Abilio hughes called for pat they are looking for a new surgeon for his shoulder because of his weight they want it done now in a hospital setting and not out patient. Can you please recommend a new surgeon and check all his testing he had done to make sure he is good for surgery. He recently did pre op testing

## 2024-07-25 LAB — STAPHYLOCOCCUS SPEC CULT: NORMAL

## 2024-08-07 ENCOUNTER — PREP FOR PROCEDURE (OUTPATIENT)
Dept: ORTHOPEDICS | Facility: HOSPITAL | Age: 69
End: 2024-08-07
Payer: MEDICARE

## 2024-08-07 ENCOUNTER — HOSPITAL ENCOUNTER (OUTPATIENT)
Facility: HOSPITAL | Age: 69
Setting detail: OUTPATIENT SURGERY
End: 2024-08-07
Attending: ORTHOPAEDIC SURGERY | Admitting: ORTHOPAEDIC SURGERY
Payer: MEDICARE

## 2024-08-07 DIAGNOSIS — M19.011 ARTHRITIS OF RIGHT SHOULDER REGION: Primary | ICD-10-CM

## 2024-10-03 ENCOUNTER — PRE-ADMISSION TESTING (OUTPATIENT)
Dept: PREADMISSION TESTING | Facility: HOSPITAL | Age: 69
End: 2024-10-03
Payer: MEDICARE

## 2024-10-03 ENCOUNTER — TELEPHONE (OUTPATIENT)
Dept: PRIMARY CARE | Facility: CLINIC | Age: 69
End: 2024-10-03

## 2024-10-03 VITALS
HEIGHT: 71 IN | WEIGHT: 315 LBS | BODY MASS INDEX: 44.1 KG/M2 | SYSTOLIC BLOOD PRESSURE: 146 MMHG | TEMPERATURE: 97.9 F | OXYGEN SATURATION: 96 % | RESPIRATION RATE: 20 BRPM | HEART RATE: 94 BPM | DIASTOLIC BLOOD PRESSURE: 71 MMHG

## 2024-10-03 DIAGNOSIS — Z01.818 PRE-OPERATIVE EXAM: Primary | ICD-10-CM

## 2024-10-03 DIAGNOSIS — M19.011 ARTHRITIS OF RIGHT SHOULDER REGION: ICD-10-CM

## 2024-10-03 DIAGNOSIS — R79.9 ABNORMAL FINDING OF BLOOD CHEMISTRY, UNSPECIFIED: ICD-10-CM

## 2024-10-03 LAB
ALBUMIN SERPL BCP-MCNC: 3.9 G/DL (ref 3.4–5)
ALP SERPL-CCNC: 35 U/L (ref 33–136)
ALT SERPL W P-5'-P-CCNC: 25 U/L (ref 10–52)
ANION GAP SERPL CALC-SCNC: 9 MMOL/L (ref 10–20)
AST SERPL W P-5'-P-CCNC: 27 U/L (ref 9–39)
BASOPHILS # BLD AUTO: 0.06 X10*3/UL (ref 0–0.1)
BASOPHILS NFR BLD AUTO: 0.8 %
BILIRUB SERPL-MCNC: 1 MG/DL (ref 0–1.2)
BUN SERPL-MCNC: 12 MG/DL (ref 6–23)
CALCIUM SERPL-MCNC: 9.2 MG/DL (ref 8.6–10.3)
CHLORIDE SERPL-SCNC: 102 MMOL/L (ref 98–107)
CO2 SERPL-SCNC: 30 MMOL/L (ref 21–32)
CREAT SERPL-MCNC: 1.16 MG/DL (ref 0.5–1.3)
EGFRCR SERPLBLD CKD-EPI 2021: 69 ML/MIN/1.73M*2
EOSINOPHIL # BLD AUTO: 0.2 X10*3/UL (ref 0–0.7)
EOSINOPHIL NFR BLD AUTO: 2.7 %
ERYTHROCYTE [DISTWIDTH] IN BLOOD BY AUTOMATED COUNT: 17.9 % (ref 11.5–14.5)
EST. AVERAGE GLUCOSE BLD GHB EST-MCNC: 160 MG/DL
GLUCOSE SERPL-MCNC: 131 MG/DL (ref 74–99)
HBA1C MFR BLD: 7.2 %
HCT VFR BLD AUTO: 53.2 % (ref 41–52)
HGB BLD-MCNC: 16.2 G/DL (ref 13.5–17.5)
IMM GRANULOCYTES # BLD AUTO: 0.02 X10*3/UL (ref 0–0.7)
IMM GRANULOCYTES NFR BLD AUTO: 0.3 % (ref 0–0.9)
LYMPHOCYTES # BLD AUTO: 0.94 X10*3/UL (ref 1.2–4.8)
LYMPHOCYTES NFR BLD AUTO: 12.8 %
MCH RBC QN AUTO: 23.9 PG (ref 26–34)
MCHC RBC AUTO-ENTMCNC: 30.5 G/DL (ref 32–36)
MCV RBC AUTO: 78 FL (ref 80–100)
MONOCYTES # BLD AUTO: 0.78 X10*3/UL (ref 0.1–1)
MONOCYTES NFR BLD AUTO: 10.7 %
NEUTROPHILS # BLD AUTO: 5.32 X10*3/UL (ref 1.2–7.7)
NEUTROPHILS NFR BLD AUTO: 72.7 %
NRBC BLD-RTO: 0 /100 WBCS (ref 0–0)
PLATELET # BLD AUTO: 286 X10*3/UL (ref 150–450)
POTASSIUM SERPL-SCNC: 5.1 MMOL/L (ref 3.5–5.3)
PROT SERPL-MCNC: 6.4 G/DL (ref 6.4–8.2)
RBC # BLD AUTO: 6.79 X10*6/UL (ref 4.5–5.9)
SODIUM SERPL-SCNC: 136 MMOL/L (ref 136–145)
WBC # BLD AUTO: 7.3 X10*3/UL (ref 4.4–11.3)

## 2024-10-03 PROCEDURE — 80053 COMPREHEN METABOLIC PANEL: CPT

## 2024-10-03 PROCEDURE — 83036 HEMOGLOBIN GLYCOSYLATED A1C: CPT | Mod: GEALAB

## 2024-10-03 PROCEDURE — 36415 COLL VENOUS BLD VENIPUNCTURE: CPT

## 2024-10-03 PROCEDURE — 99204 OFFICE O/P NEW MOD 45 MIN: CPT | Performed by: REGISTERED NURSE

## 2024-10-03 PROCEDURE — 85025 COMPLETE CBC W/AUTO DIFF WBC: CPT

## 2024-10-03 ASSESSMENT — DUKE ACTIVITY SCORE INDEX (DASI)
CAN YOU WALK INDOORS, SUCH AS AROUND YOUR HOUSE: YES
DASI METS SCORE: 5.7
CAN YOU HAVE SEXUAL RELATIONS: YES
CAN YOU TAKE CARE OF YOURSELF (EAT, DRESS, BATHE, OR USE TOILET): YES
CAN YOU DO YARD WORK LIKE RAKING LEAVES, WEEDING OR PUSHING A MOWER: NO
CAN YOU RUN A SHORT DISTANCE: NO
CAN YOU PARTICIPATE IN MODERATE RECREATIONAL ACTIVITIES LIKE GOLF, BOWLING, DANCING, DOUBLES TENNIS OR THROWING A BASEBALL OR FOOTBALL: NO
CAN YOU WALK A BLOCK OR TWO ON LEVEL GROUND: YES
CAN YOU DO LIGHT WORK AROUND THE HOUSE LIKE DUSTING OR WASHING DISHES: YES
TOTAL_SCORE: 24.2
CAN YOU DO MODERATE WORK AROUND THE HOUSE LIKE VACUUMING, SWEEPING FLOORS OR CARRYING GROCERIES: YES
CAN YOU PARTICIPATE IN STRENOUS SPORTS LIKE SWIMMING, SINGLES TENNIS, FOOTBALL, BASKETBALL, OR SKIING: NO
CAN YOU CLIMB A FLIGHT OF STAIRS OR WALK UP A HILL: YES
CAN YOU DO HEAVY WORK AROUND THE HOUSE LIKE SCRUBBING FLOORS OR LIFTING AND MOVING HEAVY FURNITURE: NO

## 2024-10-03 ASSESSMENT — PAIN - FUNCTIONAL ASSESSMENT: PAIN_FUNCTIONAL_ASSESSMENT: 0-10

## 2024-10-03 ASSESSMENT — LIFESTYLE VARIABLES: SMOKING_STATUS: SMOKER

## 2024-10-03 ASSESSMENT — PAIN SCALES - GENERAL: PAINLEVEL_OUTOF10: 3

## 2024-10-03 ASSESSMENT — ACTIVITIES OF DAILY LIVING (ADL): ADL_SCORE: 0

## 2024-10-03 NOTE — TELEPHONE ENCOUNTER
Was suppose to have surgery with ortho they cancelled.  Told him he needs to cardiology and PCP.  Wife is saying his breathing.  She also brought up his one foot the skin is peeling wife also said leg is swollen and cracking. Coloring in feet is dark.   Sleeping a lot.  Has gained quite a bit of weight.  Did you want to look at his PATs?  Should I schedule him?

## 2024-10-03 NOTE — TELEPHONE ENCOUNTER
Are we putting in the order for sleep apnea.  Wife said legs have water seeping from them.  I told them to go to the ER but wife wanted me to talk to you.

## 2024-10-03 NOTE — CPM/PAT H&P
CPM/PAT Evaluation       Name: Gen Solis II (Gen Solis II)  /Age: 1955/69 y.o.     In-Person       Chief Complaint: Evaluation prior to surgery    HPI    Past Medical History:   Diagnosis Date    Anesthesia     anger, agitated , slighly violent    Anxiety     Hyperlipidemia     Hypertension     Low testosterone in male     Obesity     Sleep apnea     does juhi use any device       Past Surgical History:   Procedure Laterality Date    APPENDECTOMY      CATARACT EXTRACTION Bilateral     CHOLECYSTECTOMY      KNEE SURGERY Right     meniscus repair    NASAL SEPTUM SURGERY      VASECTOMY         Patient  has no history on file for sexual activity.    No family history on file.    Allergies   Allergen Reactions    Bee Venom Protein (Honey Bee) Swelling    Codeine Other    Penicillins Unknown     Makes nerve endings tingle; tolerating amp/sulbactam 2023    Latex Rash and Unknown       Prior to Admission medications    Medication Sig Start Date End Date Taking? Authorizing Provider   acetaminophen (TylenoL) 325 mg tablet Take by mouth every 6 hours. 22  Yes Historical Provider, MD   escitalopram (Lexapro) 10 mg tablet TAKE 1 TABLET EVERY DAY 24  Yes Pierce Leonard DO   multivitamin with minerals iron-free (Centrum Silver) Take 1 tablet by mouth once daily.   Yes Historical Provider, MD   olmesartan (BENIcar) 40 mg tablet TAKE 1 TABLET EVERY DAY 23  Yes Pierce Leonard DO   simvastatin (Zocor) 20 mg tablet TAKE 1 TABLET EVERY DAY 24  Yes Pierce Leonard, DO   chlorhexidine (Peridex) 0.12 % solution Use 15 mL in the mouth or throat if needed (pre operative 15ml swish and spit the night befor and morning of surgery) for up to 2 doses. 24   DIDI Booker-CNP   testosterone 20.25 mg/1.25 gram (1.62 %) gel in metered-dose pump use 2 pumps EVERY DAY AS DIRECTED  Patient taking differently: use 2 pumps EVERY DAY AS DIRECTED, double swipe in am, single swipe at hs 12/15/23   Pierce LOGAN  Horacio, DO        PAT ROS     PAT Physical Exam     Airway    Visit Vitals  /71   Pulse 94   Temp 36.6 °C (97.9 °F) (Tympanic)   Resp 20       DASI Risk Score      Flowsheet Row Pre-Admission Testing from 10/3/2024 in [facility] Questionnaire Series Submission from 7/23/2024 in [facility] with Generic Provider Raul   Can you take care of yourself (eat, dress, bathe, or use toilet)?  2.75 filed at 10/03/2024 0810 2.75  filed at 07/23/2024 1350   Can you walk indoors, such as around your house? 1.75 filed at 10/03/2024 0810 1.75  filed at 07/23/2024 1350   Can you walk a block or two on level ground?  2.75 filed at 10/03/2024 0810 2.75  filed at 07/23/2024 1350   Can you climb a flight of stairs or walk up a hill? 5.5 filed at 10/03/2024 0810 5.5  filed at 07/23/2024 1350   Can you run a short distance? 0 filed at 10/03/2024 0810 0  filed at 07/23/2024 1350   Can you do light work around the house like dusting or washing dishes? 2.7 filed at 10/03/2024 0810 2.7  filed at 07/23/2024 1350   Can you do moderate work around the house like vacuuming, sweeping floors or carrying groceries? 3.5 filed at 10/03/2024 0810 3.5  filed at 07/23/2024 1350   Can you do heavy work around the house like scrubbing floors or lifting and moving heavy furniture?  0 filed at 10/03/2024 0810 0  filed at 07/23/2024 1350   Can you do yard work like raking leaves, weeding or pushing a mower? 0 filed at 10/03/2024 0810 4.5  filed at 07/23/2024 1350   Can you have sexual relations? 5.25 filed at 10/03/2024 0810 0  filed at 07/23/2024 1350   Can you participate in moderate recreational activities like golf, bowling, dancing, doubles tennis or throwing a baseball or football? 0 filed at 10/03/2024 0810 0  filed at 07/23/2024 1350   Can you participate in strenous sports like swimming, singles tennis, football, basketball, or skiing? 0 filed at 10/03/2024 0810 0  filed at 07/23/2024 1350   DASI SCORE 24.2 filed at 10/03/2024  0810 23.45  filed at 2024 1350   METS Score (Will be calculated only when all the questions are answered) 5.7 filed at 10/03/2024 0810 5.6  filed at 2024 1350          Caprini DVT Assessment    No data to display       Modified Frailty Index    No data to display       CHADS2 Stroke Risk  Current as of 21 minutes ago        N/A 3 to 100%: High Risk   2 to < 3%: Medium Risk   0 to < 2%: Low Risk     Last Change: N/A          This score determines the patient's risk of having a stroke if the patient has atrial fibrillation.        This score is not applicable to this patient. Components are not calculated.          Revised Cardiac Risk Index    No data to display       Apfel Simplified Score    No data to display       Risk Analysis Index Results This Encounter         10/3/2024  0810             Do you live in a place other than your own home?: 0    When did you begin living in the place you are currently residing?: Greater than one year ago    Any kidney failure, kidney not working well, or seeing a kidney doctor (nephrologist)? If yes, was this for kidney stones or another problem?: 0 No    Any history of chronic (long-term) congestive heart failure (CHF)?: 0 No    Any shortness of breath when resting?: 3 Yes    In the past five years, have you been diagnosed with or treated for cancer?: No    During the last 3 months has it become difficult for you to remember things or organize your thoughts?: 0 No    Have you lost weight of 10 pounds or more in the past 3 months without trying?: 0 No    Do you have any loss of appetitie?: 0 No    Getting Around (Mobility): 0 Can get around without help    Eatin Can plan and prepare own meals    Toiletin Can use toilet without any help    Personal Hygiene (Bathing, Hand Washing, Changing Clothes): 0 Can shower or bathe without any help    [place] Cancer History: Patient does not indicate history of cancer    Total Risk Analysis Index Score Without Cancer: 26     Total Risk Analysis Index Score: 26          Stop Bang Score      Flowsheet Row Pre-Admission Testing from 10/3/2024 in [facility] Pre-Admission Testing from 7/23/2024 in [facility]   Do you snore loudly? 1 filed at 10/03/2024 0809 0 filed at 07/23/2024 0753   Do you often feel tired or fatigued after your sleep? 1 filed at 10/03/2024 0809 1 filed at 07/23/2024 0753   Has anyone ever observed you stop breathing in your sleep? 0 filed at 10/03/2024 0809 0 filed at 07/23/2024 0753   Do you have or are you being treated for high blood pressure? 1 filed at 10/03/2024 0809 1 filed at 07/23/2024 0753   Recent BMI (Calculated) 47 filed at 10/03/2024 0809 47 filed at 07/23/2024 0753   Is BMI greater than 35 kg/m2? 1=Yes filed at 10/03/2024 0809 1=Yes filed at 07/23/2024 0753   Age older than 50 years old? 1=Yes filed at 10/03/2024 0809 1=Yes filed at 07/23/2024 0753   Is your neck circumference greater than 17 inches (Male) or 16 inches (Female)? 0 filed at 10/03/2024 0809 1 filed at 07/23/2024 0753   Gender - Male 1=Yes filed at 10/03/2024 0809 1=Yes filed at 07/23/2024 0753   STOP-BANG Total Score 6 filed at 10/03/2024 0809 6 filed at 07/23/2024 0753                Assessment & Plan:    69 y.o.  male  scheduled for RIGHT  REVERSE TOTAL SHOULDER ARTHROPLASTY - Right  on 10/10/24 with Dr. Belle for  Arthritis of right shoulder region .  PMHX includes HTN, HLD, JAMIL, low testosterone, anxiety.  PAT consulted for perioperative risk stratification and optimization    Neuro:  Hx anxiety, on lexapro  Patient is at increased risk for perioperative CVA secondary to  HTN, increased age    HEENT:  No HEENT diagnosis or significant findings on chart review or clinical presentation and evaluation. No further preoperative testing/intervention indicated at this time.    Cardiovascular:  HTN on olmesartan  HLD on simvastatin  METS: 5.7  RCRI: 0 points, 3.9%  risk for postoperative MACE   GABRIELA: 0.6% risk for  30 day postoperative MACE  EKG - 7/23/24  Normal sinus rhythm  Nonspecific T wave abnormality  Abnormal ECG  When compared with ECG of 13-NOV-2022 19:41,  No significant change was found  Confirmed by Steve Carlos ([id number]) on 7/23/2024 12:04:25 PM    Pulmonary:  CASTAÑEDA, JAMIL does not use CPAP  CASTAÑEDA upon arrival to [facility] exam room,   O2 today 91% improved to 96% after deep breathing  Stop Bang score is 6 placing patient at high risk for JAMIL  ARISCAT: <26 points, 1.6% risk of in-hospital postoperative pulmonary complication  PRODIGY: High risk for opioid induced respiratory depression        Urological/Renal  No diagnosis or significant findings on chart review or clinical presentation and evaluation.     Endocrine:  A1C 7.6 7/23/24 at [facility]  A1C redrawn today  Low testosterone, on replacement therapy    Hematologic:  Antiplatelet management   The patient is not currently receiving antiplatelet therapy.  Anticoagulation management  The patient is not currently receiving anticoagulation therapy.  Caprini DVT Risk 7    Gastrointestinal:   BMI today 47.97  Discussed with Anesthesia Degeorge  Eat-10 score 0      Infectious disease:   MRSA negative 7/23/24      Musculoskeletal:   Arthritis of right shoulder region     7/22/24 CT Right shoulder  IMPRESSION:  Severe glenohumeral and moderate acromioclavicular joint degenerative  change.    6/20/24 MR Right shoulder  IMPRESSION:  1. Moderate to severe glenohumeral joint osteoarthritis. There is  small glenohumeral joint effusion with intra-articular  debris/synovial hypertrophy      2. Mild subscapularis tendinosis with low-grade partial  intrasubstance tear of the superior subscapularis tendon at the  lesser tuberosity.      3. Moderate long head biceps tendinosis proximal intra-articular  portion with mild subluxation from the superior margin of the  bicipital groove      4. Supraspinatus and infraspinatus tendinosis without focal  full-thickness tear  demonstrated.  Anesthesia/Airway:  Patient verbalizes prior agitation and swinging post anesthesia.     Anesthesia Degeorge spoke to patient directly in PAT.         Labs ordered  CBC  CMP  A1C

## 2024-10-07 ENCOUNTER — APPOINTMENT (OUTPATIENT)
Dept: RADIOLOGY | Facility: HOSPITAL | Age: 69
End: 2024-10-07
Payer: MEDICARE

## 2024-10-07 ENCOUNTER — APPOINTMENT (OUTPATIENT)
Dept: PRIMARY CARE | Facility: CLINIC | Age: 69
End: 2024-10-07
Payer: MEDICARE

## 2024-10-07 ENCOUNTER — HOSPITAL ENCOUNTER (INPATIENT)
Facility: HOSPITAL | Age: 69
LOS: 2 days | Discharge: HOME | End: 2024-10-09
Attending: EMERGENCY MEDICINE | Admitting: STUDENT IN AN ORGANIZED HEALTH CARE EDUCATION/TRAINING PROGRAM
Payer: MEDICARE

## 2024-10-07 ENCOUNTER — APPOINTMENT (OUTPATIENT)
Dept: CARDIOLOGY | Facility: HOSPITAL | Age: 69
End: 2024-10-07
Payer: MEDICARE

## 2024-10-07 VITALS
WEIGHT: 315 LBS | HEART RATE: 72 BPM | DIASTOLIC BLOOD PRESSURE: 104 MMHG | OXYGEN SATURATION: 93 % | SYSTOLIC BLOOD PRESSURE: 178 MMHG | TEMPERATURE: 98.9 F | BODY MASS INDEX: 44.1 KG/M2 | HEIGHT: 71 IN

## 2024-10-07 DIAGNOSIS — R06.02 SOB (SHORTNESS OF BREATH): Primary | ICD-10-CM

## 2024-10-07 DIAGNOSIS — R06.02 SHORTNESS OF BREATH: Primary | ICD-10-CM

## 2024-10-07 DIAGNOSIS — E87.70 HYPERVOLEMIA, UNSPECIFIED HYPERVOLEMIA TYPE: ICD-10-CM

## 2024-10-07 DIAGNOSIS — M79.89 RIGHT LEG SWELLING: ICD-10-CM

## 2024-10-07 DIAGNOSIS — U07.1 COVID-19: ICD-10-CM

## 2024-10-07 DIAGNOSIS — I50.43 CHF (CONGESTIVE HEART FAILURE), NYHA CLASS I, ACUTE ON CHRONIC, COMBINED: ICD-10-CM

## 2024-10-07 DIAGNOSIS — I50.9 ACUTE CONGESTIVE HEART FAILURE, UNSPECIFIED HEART FAILURE TYPE: ICD-10-CM

## 2024-10-07 PROBLEM — E66.01 MORBID (SEVERE) OBESITY DUE TO EXCESS CALORIES (MULTI): Status: ACTIVE | Noted: 2024-10-07

## 2024-10-07 PROBLEM — S27.329A CONTUSION OF LUNG: Status: RESOLVED | Noted: 2024-10-07 | Resolved: 2024-10-07

## 2024-10-07 PROBLEM — I73.9 PERIPHERAL VASCULAR DISEASE, UNSPECIFIED (CMS-HCC): Status: ACTIVE | Noted: 2024-10-07

## 2024-10-07 PROBLEM — S22.49XA FRACTURE OF MULTIPLE RIBS: Status: RESOLVED | Noted: 2024-10-07 | Resolved: 2024-10-07

## 2024-10-07 PROBLEM — L97.501: Status: ACTIVE | Noted: 2024-10-07

## 2024-10-07 PROBLEM — S32.009A FRACTURE OF TRANSVERSE PROCESS OF LUMBAR VERTEBRA (MULTI): Status: RESOLVED | Noted: 2024-10-07 | Resolved: 2024-10-07

## 2024-10-07 LAB
ALBUMIN SERPL BCP-MCNC: 4 G/DL (ref 3.4–5)
ALP SERPL-CCNC: 40 U/L (ref 33–136)
ALT SERPL W P-5'-P-CCNC: 26 U/L (ref 10–52)
ANION GAP SERPL CALC-SCNC: 9 MMOL/L (ref 10–20)
AST SERPL W P-5'-P-CCNC: 23 U/L (ref 9–39)
BASOPHILS # BLD AUTO: 0.07 X10*3/UL (ref 0–0.1)
BASOPHILS NFR BLD AUTO: 1.1 %
BILIRUB SERPL-MCNC: 0.9 MG/DL (ref 0–1.2)
BNP SERPL-MCNC: 49 PG/ML (ref 0–99)
BUN SERPL-MCNC: 13 MG/DL (ref 6–23)
CALCIUM SERPL-MCNC: 9.3 MG/DL (ref 8.6–10.3)
CARDIAC TROPONIN I PNL SERPL HS: 12 NG/L (ref 0–20)
CARDIAC TROPONIN I PNL SERPL HS: 12 NG/L (ref 0–20)
CHLORIDE SERPL-SCNC: 99 MMOL/L (ref 98–107)
CO2 SERPL-SCNC: 30 MMOL/L (ref 21–32)
CREAT SERPL-MCNC: 0.95 MG/DL (ref 0.5–1.3)
EGFRCR SERPLBLD CKD-EPI 2021: 88 ML/MIN/1.73M*2
EOSINOPHIL # BLD AUTO: 0.24 X10*3/UL (ref 0–0.7)
EOSINOPHIL NFR BLD AUTO: 3.9 %
ERYTHROCYTE [DISTWIDTH] IN BLOOD BY AUTOMATED COUNT: 18 % (ref 11.5–14.5)
FLUAV RNA RESP QL NAA+PROBE: NOT DETECTED
FLUBV RNA RESP QL NAA+PROBE: NOT DETECTED
GLUCOSE SERPL-MCNC: 112 MG/DL (ref 74–99)
HCT VFR BLD AUTO: 56.3 % (ref 41–52)
HGB BLD-MCNC: 17.1 G/DL (ref 13.5–17.5)
IMM GRANULOCYTES # BLD AUTO: 0.09 X10*3/UL (ref 0–0.7)
IMM GRANULOCYTES NFR BLD AUTO: 1.4 % (ref 0–0.9)
LYMPHOCYTES # BLD AUTO: 1.31 X10*3/UL (ref 1.2–4.8)
LYMPHOCYTES NFR BLD AUTO: 21.1 %
MAGNESIUM SERPL-MCNC: 1.63 MG/DL (ref 1.6–2.4)
MCH RBC QN AUTO: 23.5 PG (ref 26–34)
MCHC RBC AUTO-ENTMCNC: 30.4 G/DL (ref 32–36)
MCV RBC AUTO: 77 FL (ref 80–100)
MONOCYTES # BLD AUTO: 0.83 X10*3/UL (ref 0.1–1)
MONOCYTES NFR BLD AUTO: 13.3 %
NEUTROPHILS # BLD AUTO: 3.68 X10*3/UL (ref 1.2–7.7)
NEUTROPHILS NFR BLD AUTO: 59.2 %
NRBC BLD-RTO: 0 /100 WBCS (ref 0–0)
PLATELET # BLD AUTO: 281 X10*3/UL (ref 150–450)
POTASSIUM SERPL-SCNC: 4.2 MMOL/L (ref 3.5–5.3)
PROT SERPL-MCNC: 7.1 G/DL (ref 6.4–8.2)
RBC # BLD AUTO: 7.28 X10*6/UL (ref 4.5–5.9)
SARS-COV-2 RNA RESP QL NAA+PROBE: DETECTED
SODIUM SERPL-SCNC: 134 MMOL/L (ref 136–145)
WBC # BLD AUTO: 6.2 X10*3/UL (ref 4.4–11.3)

## 2024-10-07 PROCEDURE — 99215 OFFICE O/P EST HI 40 MIN: CPT | Performed by: FAMILY MEDICINE

## 2024-10-07 PROCEDURE — 93971 EXTREMITY STUDY: CPT | Performed by: RADIOLOGY

## 2024-10-07 PROCEDURE — 71045 X-RAY EXAM CHEST 1 VIEW: CPT | Performed by: RADIOLOGY

## 2024-10-07 PROCEDURE — 2500000001 HC RX 250 WO HCPCS SELF ADMINISTERED DRUGS (ALT 637 FOR MEDICARE OP): Performed by: STUDENT IN AN ORGANIZED HEALTH CARE EDUCATION/TRAINING PROGRAM

## 2024-10-07 PROCEDURE — 93970 EXTREMITY STUDY: CPT

## 2024-10-07 PROCEDURE — 87636 SARSCOV2 & INF A&B AMP PRB: CPT | Performed by: EMERGENCY MEDICINE

## 2024-10-07 PROCEDURE — 2550000001 HC RX 255 CONTRASTS: Performed by: EMERGENCY MEDICINE

## 2024-10-07 PROCEDURE — 2500000001 HC RX 250 WO HCPCS SELF ADMINISTERED DRUGS (ALT 637 FOR MEDICARE OP)

## 2024-10-07 PROCEDURE — 36415 COLL VENOUS BLD VENIPUNCTURE: CPT | Performed by: EMERGENCY MEDICINE

## 2024-10-07 PROCEDURE — 71045 X-RAY EXAM CHEST 1 VIEW: CPT

## 2024-10-07 PROCEDURE — 96374 THER/PROPH/DIAG INJ IV PUSH: CPT

## 2024-10-07 PROCEDURE — 85025 COMPLETE CBC W/AUTO DIFF WBC: CPT | Performed by: EMERGENCY MEDICINE

## 2024-10-07 PROCEDURE — 2500000004 HC RX 250 GENERAL PHARMACY W/ HCPCS (ALT 636 FOR OP/ED): Performed by: NURSE PRACTITIONER

## 2024-10-07 PROCEDURE — 80053 COMPREHEN METABOLIC PANEL: CPT | Performed by: EMERGENCY MEDICINE

## 2024-10-07 PROCEDURE — 1200000002 HC GENERAL ROOM WITH TELEMETRY DAILY

## 2024-10-07 PROCEDURE — 99223 1ST HOSP IP/OBS HIGH 75: CPT | Performed by: STUDENT IN AN ORGANIZED HEALTH CARE EDUCATION/TRAINING PROGRAM

## 2024-10-07 PROCEDURE — 71275 CT ANGIOGRAPHY CHEST: CPT

## 2024-10-07 PROCEDURE — 99285 EMERGENCY DEPT VISIT HI MDM: CPT | Mod: 25

## 2024-10-07 PROCEDURE — 1036F TOBACCO NON-USER: CPT | Performed by: FAMILY MEDICINE

## 2024-10-07 PROCEDURE — 1159F MED LIST DOCD IN RCRD: CPT | Performed by: FAMILY MEDICINE

## 2024-10-07 PROCEDURE — 83880 ASSAY OF NATRIURETIC PEPTIDE: CPT | Performed by: EMERGENCY MEDICINE

## 2024-10-07 PROCEDURE — 84484 ASSAY OF TROPONIN QUANT: CPT | Performed by: EMERGENCY MEDICINE

## 2024-10-07 PROCEDURE — 93005 ELECTROCARDIOGRAM TRACING: CPT

## 2024-10-07 PROCEDURE — 83735 ASSAY OF MAGNESIUM: CPT | Performed by: EMERGENCY MEDICINE

## 2024-10-07 PROCEDURE — 3008F BODY MASS INDEX DOCD: CPT | Performed by: FAMILY MEDICINE

## 2024-10-07 PROCEDURE — 2500000002 HC RX 250 W HCPCS SELF ADMINISTERED DRUGS (ALT 637 FOR MEDICARE OP, ALT 636 FOR OP/ED): Performed by: STUDENT IN AN ORGANIZED HEALTH CARE EDUCATION/TRAINING PROGRAM

## 2024-10-07 PROCEDURE — 3077F SYST BP >= 140 MM HG: CPT | Performed by: FAMILY MEDICINE

## 2024-10-07 PROCEDURE — 3080F DIAST BP >= 90 MM HG: CPT | Performed by: FAMILY MEDICINE

## 2024-10-07 PROCEDURE — 36415 COLL VENOUS BLD VENIPUNCTURE: CPT

## 2024-10-07 PROCEDURE — 84484 ASSAY OF TROPONIN QUANT: CPT

## 2024-10-07 PROCEDURE — 2500000004 HC RX 250 GENERAL PHARMACY W/ HCPCS (ALT 636 FOR OP/ED)

## 2024-10-07 RX ORDER — ONDANSETRON HYDROCHLORIDE 2 MG/ML
4 INJECTION, SOLUTION INTRAVENOUS EVERY 6 HOURS PRN
Status: DISCONTINUED | OUTPATIENT
Start: 2024-10-07 | End: 2024-10-09 | Stop reason: HOSPADM

## 2024-10-07 RX ORDER — ESCITALOPRAM OXALATE 10 MG/1
10 TABLET ORAL DAILY
Status: DISCONTINUED | OUTPATIENT
Start: 2024-10-08 | End: 2024-10-09 | Stop reason: HOSPADM

## 2024-10-07 RX ORDER — AMMONIUM LACTATE 12 G/100G
1 LOTION TOPICAL 2 TIMES DAILY
Status: DISCONTINUED | OUTPATIENT
Start: 2024-10-07 | End: 2024-10-09 | Stop reason: HOSPADM

## 2024-10-07 RX ORDER — HYDRALAZINE HYDROCHLORIDE 20 MG/ML
10 INJECTION INTRAMUSCULAR; INTRAVENOUS ONCE
Status: COMPLETED | OUTPATIENT
Start: 2024-10-07 | End: 2024-10-07

## 2024-10-07 RX ORDER — ACETAMINOPHEN 325 MG/1
650 TABLET ORAL EVERY 6 HOURS
Status: DISCONTINUED | OUTPATIENT
Start: 2024-10-07 | End: 2024-10-09 | Stop reason: HOSPADM

## 2024-10-07 RX ORDER — VALSARTAN 160 MG/1
320 TABLET ORAL DAILY
Status: DISCONTINUED | OUTPATIENT
Start: 2024-10-07 | End: 2024-10-09 | Stop reason: HOSPADM

## 2024-10-07 RX ORDER — FUROSEMIDE 10 MG/ML
40 INJECTION INTRAMUSCULAR; INTRAVENOUS DAILY
Status: DISCONTINUED | OUTPATIENT
Start: 2024-10-08 | End: 2024-10-09 | Stop reason: HOSPADM

## 2024-10-07 RX ORDER — ENOXAPARIN SODIUM 100 MG/ML
40 INJECTION SUBCUTANEOUS DAILY
Status: DISCONTINUED | OUTPATIENT
Start: 2024-10-08 | End: 2024-10-09 | Stop reason: HOSPADM

## 2024-10-07 RX ORDER — SIMVASTATIN 20 MG/1
20 TABLET, FILM COATED ORAL DAILY
Status: DISCONTINUED | OUTPATIENT
Start: 2024-10-08 | End: 2024-10-09 | Stop reason: HOSPADM

## 2024-10-07 RX ORDER — ACETAMINOPHEN 500 MG
5 TABLET ORAL NIGHTLY PRN
Status: DISCONTINUED | OUTPATIENT
Start: 2024-10-07 | End: 2024-10-09 | Stop reason: HOSPADM

## 2024-10-07 RX ORDER — MULTIVIT-MIN/IRON FUM/FOLIC AC 7.5 MG-4
1 TABLET ORAL DAILY
Status: DISCONTINUED | OUTPATIENT
Start: 2024-10-08 | End: 2024-10-09 | Stop reason: HOSPADM

## 2024-10-07 RX ORDER — AMOXICILLIN 250 MG
2 CAPSULE ORAL NIGHTLY PRN
Status: DISCONTINUED | OUTPATIENT
Start: 2024-10-07 | End: 2024-10-09 | Stop reason: HOSPADM

## 2024-10-07 RX ORDER — FAMOTIDINE 20 MG/1
10 TABLET, FILM COATED ORAL 2 TIMES DAILY PRN
Status: DISCONTINUED | OUTPATIENT
Start: 2024-10-07 | End: 2024-10-09 | Stop reason: HOSPADM

## 2024-10-07 RX ORDER — ACETAMINOPHEN 325 MG/1
975 TABLET ORAL ONCE
Status: COMPLETED | OUTPATIENT
Start: 2024-10-07 | End: 2024-10-07

## 2024-10-07 RX ORDER — FUROSEMIDE 10 MG/ML
40 INJECTION INTRAMUSCULAR; INTRAVENOUS ONCE
Status: COMPLETED | OUTPATIENT
Start: 2024-10-07 | End: 2024-10-07

## 2024-10-07 RX ADMIN — HYDRALAZINE HYDROCHLORIDE 10 MG: 20 INJECTION INTRAMUSCULAR; INTRAVENOUS at 21:44

## 2024-10-07 RX ADMIN — HYDRALAZINE HYDROCHLORIDE 10 MG: 20 INJECTION INTRAMUSCULAR; INTRAVENOUS at 23:28

## 2024-10-07 RX ADMIN — FUROSEMIDE 40 MG: 10 INJECTION, SOLUTION INTRAVENOUS at 14:23

## 2024-10-07 RX ADMIN — IOHEXOL 90 ML: 350 INJECTION, SOLUTION INTRAVENOUS at 15:52

## 2024-10-07 RX ADMIN — Medication 1 APPLICATION: at 21:44

## 2024-10-07 RX ADMIN — VALSARTAN 320 MG: 160 TABLET, FILM COATED ORAL at 17:02

## 2024-10-07 RX ADMIN — ACETAMINOPHEN 650 MG: 325 TABLET, FILM COATED ORAL at 21:45

## 2024-10-07 RX ADMIN — ACETAMINOPHEN 975 MG: 325 TABLET ORAL at 14:55

## 2024-10-07 SDOH — ECONOMIC STABILITY: INCOME INSECURITY: IN THE PAST 12 MONTHS, HAS THE ELECTRIC, GAS, OIL, OR WATER COMPANY THREATENED TO SHUT OFF SERVICE IN YOUR HOME?: NO

## 2024-10-07 SDOH — SOCIAL STABILITY: SOCIAL INSECURITY: DOES ANYONE TRY TO KEEP YOU FROM HAVING/CONTACTING OTHER FRIENDS OR DOING THINGS OUTSIDE YOUR HOME?: NO

## 2024-10-07 SDOH — SOCIAL STABILITY: SOCIAL INSECURITY
WITHIN THE LAST YEAR, HAVE YOU BEEN KICKED, HIT, SLAPPED, OR OTHERWISE PHYSICALLY HURT BY YOUR PARTNER OR EX-PARTNER?: NO

## 2024-10-07 SDOH — SOCIAL STABILITY: SOCIAL INSECURITY: DO YOU FEEL ANYONE HAS EXPLOITED OR TAKEN ADVANTAGE OF YOU FINANCIALLY OR OF YOUR PERSONAL PROPERTY?: NO

## 2024-10-07 SDOH — ECONOMIC STABILITY: FOOD INSECURITY: WITHIN THE PAST 12 MONTHS, THE FOOD YOU BOUGHT JUST DIDN'T LAST AND YOU DIDN'T HAVE MONEY TO GET MORE.: NEVER TRUE

## 2024-10-07 SDOH — SOCIAL STABILITY: SOCIAL INSECURITY: WITHIN THE LAST YEAR, HAVE YOU BEEN HUMILIATED OR EMOTIONALLY ABUSED IN OTHER WAYS BY YOUR PARTNER OR EX-PARTNER?: NO

## 2024-10-07 SDOH — SOCIAL STABILITY: SOCIAL INSECURITY: WITHIN THE LAST YEAR, HAVE YOU BEEN AFRAID OF YOUR PARTNER OR EX-PARTNER?: NO

## 2024-10-07 SDOH — SOCIAL STABILITY: SOCIAL INSECURITY: DO YOU FEEL UNSAFE GOING BACK TO THE PLACE WHERE YOU ARE LIVING?: NO

## 2024-10-07 SDOH — SOCIAL STABILITY: SOCIAL INSECURITY
WITHIN THE LAST YEAR, HAVE TO BEEN RAPED OR FORCED TO HAVE ANY KIND OF SEXUAL ACTIVITY BY YOUR PARTNER OR EX-PARTNER?: NO

## 2024-10-07 SDOH — ECONOMIC STABILITY: FOOD INSECURITY: WITHIN THE PAST 12 MONTHS, YOU WORRIED THAT YOUR FOOD WOULD RUN OUT BEFORE YOU GOT THE MONEY TO BUY MORE.: NEVER TRUE

## 2024-10-07 SDOH — SOCIAL STABILITY: SOCIAL INSECURITY
WITHIN THE LAST YEAR, HAVE YOU BEEN RAPED OR FORCED TO HAVE ANY KIND OF SEXUAL ACTIVITY BY YOUR PARTNER OR EX-PARTNER?: NO

## 2024-10-07 SDOH — ECONOMIC STABILITY: FOOD INSECURITY: WITHIN THE PAST 12 MONTHS, YOU WORRIED THAT YOUR FOOD WOULD RUN OUT BEFORE YOU GOT MONEY TO BUY MORE.: NEVER TRUE

## 2024-10-07 SDOH — SOCIAL STABILITY: SOCIAL INSECURITY: ABUSE: ADULT

## 2024-10-07 SDOH — ECONOMIC STABILITY: INCOME INSECURITY: IN THE PAST 12 MONTHS HAS THE ELECTRIC, GAS, OIL, OR WATER COMPANY THREATENED TO SHUT OFF SERVICES IN YOUR HOME?: NO

## 2024-10-07 SDOH — SOCIAL STABILITY: SOCIAL INSECURITY: ARE YOU OR HAVE YOU BEEN THREATENED OR ABUSED PHYSICALLY, EMOTIONALLY, OR SEXUALLY BY ANYONE?: NO

## 2024-10-07 SDOH — SOCIAL STABILITY: SOCIAL INSECURITY: HAVE YOU HAD ANY THOUGHTS OF HARMING ANYONE ELSE?: NO

## 2024-10-07 SDOH — SOCIAL STABILITY: SOCIAL INSECURITY: HAVE YOU HAD THOUGHTS OF HARMING ANYONE ELSE?: NO

## 2024-10-07 SDOH — SOCIAL STABILITY: SOCIAL INSECURITY: WERE YOU ABLE TO COMPLETE ALL THE BEHAVIORAL HEALTH SCREENINGS?: YES

## 2024-10-07 SDOH — SOCIAL STABILITY: SOCIAL INSECURITY: ARE THERE ANY APPARENT SIGNS OF INJURIES/BEHAVIORS THAT COULD BE RELATED TO ABUSE/NEGLECT?: NO

## 2024-10-07 SDOH — SOCIAL STABILITY: SOCIAL INSECURITY: HAS ANYONE EVER THREATENED TO HURT YOUR FAMILY OR YOUR PETS?: NO

## 2024-10-07 ASSESSMENT — COGNITIVE AND FUNCTIONAL STATUS - GENERAL
DAILY ACTIVITIY SCORE: 20
WALKING IN HOSPITAL ROOM: A LITTLE
STANDING UP FROM CHAIR USING ARMS: A LITTLE
DRESSING REGULAR UPPER BODY CLOTHING: A LITTLE
HELP NEEDED FOR BATHING: A LITTLE
TURNING FROM BACK TO SIDE WHILE IN FLAT BAD: A LITTLE
MOVING TO AND FROM BED TO CHAIR: A LITTLE
MOVING FROM LYING ON BACK TO SITTING ON SIDE OF FLAT BED WITH BEDRAILS: A LITTLE
CLIMB 3 TO 5 STEPS WITH RAILING: A LOT
DRESSING REGULAR LOWER BODY CLOTHING: A LITTLE
MOBILITY SCORE: 17
TOILETING: A LITTLE
PATIENT BASELINE BEDBOUND: NO

## 2024-10-07 ASSESSMENT — PAIN SCALES - GENERAL
PAINLEVEL_OUTOF10: 5 - MODERATE PAIN
PAINLEVEL_OUTOF10: 0 - NO PAIN
PAINLEVEL_OUTOF10: 3
PAINLEVEL_OUTOF10: 8
PAINLEVEL_OUTOF10: 2
PAINLEVEL_OUTOF10: 0 - NO PAIN
PAINLEVEL_OUTOF10: 3
PAINLEVEL_OUTOF10: 2

## 2024-10-07 ASSESSMENT — LIFESTYLE VARIABLES
SKIP TO QUESTIONS 9-10: 0
AUDIT-C TOTAL SCORE: 5
AUDIT TOTAL SCORE: 0
HOW OFTEN DURING THE LAST YEAR HAVE YOU FAILED TO DO WHAT WAS NORMALLY EXPECTED FROM YOU BECAUSE OF DRINKING: NEVER
HOW OFTEN DURING THE LAST YEAR HAVE YOU BEEN UNABLE TO REMEMBER WHAT HAPPENED THE NIGHT BEFORE BECAUSE YOU HAD BEEN DRINKING: NEVER
HOW OFTEN DO YOU HAVE 6 OR MORE DRINKS ON ONE OCCASION: WEEKLY
EVER FELT BAD OR GUILTY ABOUT YOUR DRINKING: NO
HOW OFTEN DURING THE LAST YEAR HAVE YOU NEEDED AN ALCOHOLIC DRINK FIRST THING IN THE MORNING TO GET YOURSELF GOING AFTER A NIGHT OF HEAVY DRINKING: NEVER
EVER HAD A DRINK FIRST THING IN THE MORNING TO STEADY YOUR NERVES TO GET RID OF A HANGOVER: NO
HOW OFTEN DURING THE LAST YEAR HAVE YOU HAD A FEELING OF GUILT OR REMORSE AFTER DRINKING: NEVER
HAVE YOU EVER FELT YOU SHOULD CUT DOWN ON YOUR DRINKING: NO
AUDIT-C TOTAL SCORE: 5
TOTAL SCORE: 0
HOW OFTEN DO YOU HAVE A DRINK CONTAINING ALCOHOL: 2-4 TIMES A MONTH
HOW OFTEN DURING THE LAST YEAR HAVE YOU FOUND THAT YOU WERE NOT ABLE TO STOP DRINKING ONCE YOU HAD STARTED: NEVER
AUDIT TOTAL SCORE: 5
HAVE YOU OR SOMEONE ELSE BEEN INJURED AS A RESULT OF YOUR DRINKING: NO
HOW MANY STANDARD DRINKS CONTAINING ALCOHOL DO YOU HAVE ON A TYPICAL DAY: 1 OR 2
HAS A RELATIVE, FRIEND, DOCTOR, OR ANOTHER HEALTH PROFESSIONAL EXPRESSED CONCERN ABOUT YOUR DRINKING OR SUGGESTED YOU CUT DOWN: NO
HAVE PEOPLE ANNOYED YOU BY CRITICIZING YOUR DRINKING: NO

## 2024-10-07 ASSESSMENT — PATIENT HEALTH QUESTIONNAIRE - PHQ9
1. LITTLE INTEREST OR PLEASURE IN DOING THINGS: NOT AT ALL
2. FEELING DOWN, DEPRESSED OR HOPELESS: NOT AT ALL
SUM OF ALL RESPONSES TO PHQ9 QUESTIONS 1 AND 2: 0
SUM OF ALL RESPONSES TO PHQ9 QUESTIONS 1 & 2: 0
2. FEELING DOWN, DEPRESSED OR HOPELESS: NOT AT ALL
1. LITTLE INTEREST OR PLEASURE IN DOING THINGS: NOT AT ALL

## 2024-10-07 ASSESSMENT — ACTIVITIES OF DAILY LIVING (ADL)
PATIENT'S MEMORY ADEQUATE TO SAFELY COMPLETE DAILY ACTIVITIES?: YES
ASSISTIVE_DEVICE: EYEGLASSES
FEEDING YOURSELF: INDEPENDENT
JUDGMENT_ADEQUATE_SAFELY_COMPLETE_DAILY_ACTIVITIES: YES
ADEQUATE_TO_COMPLETE_ADL: YES
LACK_OF_TRANSPORTATION: NO
BATHING: INDEPENDENT
DRESSING YOURSELF: INDEPENDENT
TOILETING: INDEPENDENT
HEARING - RIGHT EAR: FUNCTIONAL
GROOMING: INDEPENDENT
WALKS IN HOME: INDEPENDENT
HEARING - LEFT EAR: FUNCTIONAL

## 2024-10-07 ASSESSMENT — PAIN - FUNCTIONAL ASSESSMENT
PAIN_FUNCTIONAL_ASSESSMENT: 0-10
PAIN_FUNCTIONAL_ASSESSMENT: 0-10

## 2024-10-07 ASSESSMENT — COLUMBIA-SUICIDE SEVERITY RATING SCALE - C-SSRS
2. HAVE YOU ACTUALLY HAD ANY THOUGHTS OF KILLING YOURSELF?: NO
6. HAVE YOU EVER DONE ANYTHING, STARTED TO DO ANYTHING, OR PREPARED TO DO ANYTHING TO END YOUR LIFE?: NO
1. IN THE PAST MONTH, HAVE YOU WISHED YOU WERE DEAD OR WISHED YOU COULD GO TO SLEEP AND NOT WAKE UP?: NO

## 2024-10-07 NOTE — PROGRESS NOTES
I reviewed and examined the patient. I was present for the key exam elements, and I fully participated in the patient's care. I discussed the management of the care with the resident. I have personally reviewed the pertinent labs and imaging, as well as recent notes, with the patient. I have reviewed the note above and agree with the resident's medical decision making as documented in the resident's note, in addition to the following comments / findings:     Agree with the rest of the plan outlined below by resident physician. No red flags.      The patient understands and agrees to the assessment and plan of care. Patient has also agreed to follow up and comply with the treatment and evaluation as recommended today. Patient was instructed to call the office at [phone] should questions arise regarding their treatment or care.     Pierce Leonard DO, FAOASM  Family Medicine   [address], [address]  [place] [address]     Pierce Leonard DO

## 2024-10-07 NOTE — PROGRESS NOTES
Subjective   Patient ID: Gen Solis II is a 69 y.o. male who presents for Leg Swelling.         Reviewed all medications by prescribing practitioner or clinical pharmacist (such as prescriptions, OTCs, herbal therapies and supplements) and documented in the medical record.    HPI  Leg Swelling   Past medical history significant for sleep apnea which he does not treat, hypertension, hyperlipidemia.  Gen has had bilateral leg swelling for two weeks since travelling to [place]. He was supposed to undergo total R shoulder arthroplasty on 10/10 however, during his pre-op evaluation, his surgery was cancelled due to leg swelling, shortness of breath, and cough. He states that his L leg has been swollen since injuring his L first digit months ago. He contracted a cough since his trip to [place] two weeks ago. Furthermore, today he states that his shortness of breath has gotten worse compared to days past. He also states that he has some chest tightness.  Denies dizziness, weakness, confusion, palpitations, abdominal pain, postprandial pain, bowel/bladder changes.    Review of Systems  All pertinent positive symptoms are included in the history of present illness.  All other systems have been reviewed and are negative and noncontributory to this patient's current ailments.    Past Medical History:   Diagnosis Date    Anesthesia     anger, agitated , slighly violent    Anxiety     Hyperlipidemia     Hypertension     Low testosterone in male     Obesity     Sleep apnea     does juhi use any device     Past Surgical History:   Procedure Laterality Date    APPENDECTOMY      CATARACT EXTRACTION Bilateral     CHOLECYSTECTOMY      KNEE SURGERY Right     meniscus repair    NASAL SEPTUM SURGERY      VASECTOMY       Social History     Tobacco Use    Smoking status: Former     Current packs/day: 0.00     Types: Cigarettes     Quit date:      Years since quittin.7     Passive exposure: Never    Smokeless tobacco:  "Never   Vaping Use    Vaping status: Never Used   Substance Use Topics    Alcohol use: Not Currently     Comment: beer or rum and coke daily    Drug use: Not Currently     Types: Marijuana     Comment: on vacation, last use sunday     No family history on file.  Immunization History   Administered Date(s) Administered    DTaP vaccine, pediatric  (INFANRIX) 04/15/2015     Current Outpatient Medications   Medication Instructions    acetaminophen (TylenoL) 325 mg tablet oral, Every 6 hours    chlorhexidine (Peridex) 0.12 % solution 15 mL, Mouth/Throat, As needed    escitalopram (LEXAPRO) 10 mg, oral, Daily    multivitamin with minerals iron-free (Centrum Silver) 1 tablet, oral, Daily    olmesartan (BENICAR) 40 mg, oral, Daily    simvastatin (ZOCOR) 20 mg, oral, Daily    testosterone 20.25 mg/1.25 gram (1.62 %) gel in metered-dose pump use 2 pumps EVERY DAY AS DIRECTED     Allergies   Allergen Reactions    Bee Venom Protein (Honey Bee) Swelling    Codeine Other    Penicillins Unknown     Makes nerve endings tingle; tolerating amp/sulbactam 1/20/2023    Latex Rash and Unknown     Objective   Vitals:    10/07/24 1053   Weight: (!) 152 kg (336 lb)   Height: 1.803 m (5' 11\")     Body mass index is 46.86 kg/m².    BP Readings from Last 3 Encounters:   10/03/24 146/71   07/23/24 170/84   03/21/24 (!) 169/96      Wt Readings from Last 3 Encounters:   10/07/24 (!) 152 kg (336 lb)   10/03/24 (!) 156 kg (343 lb 14.7 oz)   07/23/24 (!) 153 kg (337 lb 4.9 oz)      Pre-Admission Testing on 10/03/2024   Component Date Value    WBC 10/03/2024 7.3     nRBC 10/03/2024 0.0     RBC 10/03/2024 6.79 (H)     Hemoglobin 10/03/2024 16.2     Hematocrit 10/03/2024 53.2 (H)     MCV 10/03/2024 78 (L)     MCH 10/03/2024 23.9 (L)     MCHC 10/03/2024 30.5 (L)     RDW 10/03/2024 17.9 (H)     Platelets 10/03/2024 286     Neutrophils % 10/03/2024 72.7     Immature Granulocytes %,* 10/03/2024 0.3     Lymphocytes % 10/03/2024 12.8     Monocytes % " 10/03/2024 10.7     Eosinophils % 10/03/2024 2.7     Basophils % 10/03/2024 0.8     Neutrophils Absolute 10/03/2024 5.32     Immature Granulocytes Ab* 10/03/2024 0.02     Lymphocytes Absolute 10/03/2024 0.94 (L)     Monocytes Absolute 10/03/2024 0.78     Eosinophils Absolute 10/03/2024 0.20     Basophils Absolute 10/03/2024 0.06     Glucose 10/03/2024 131 (H)     Sodium 10/03/2024 136     Potassium 10/03/2024 5.1     Chloride 10/03/2024 102     Bicarbonate 10/03/2024 30     Anion Gap 10/03/2024 9 (L)     Urea Nitrogen 10/03/2024 12     Creatinine 10/03/2024 1.16     eGFR 10/03/2024 69     Calcium 10/03/2024 9.2     Albumin 10/03/2024 3.9     Alkaline Phosphatase 10/03/2024 35     Total Protein 10/03/2024 6.4     AST 10/03/2024 27     Bilirubin, Total 10/03/2024 1.0     ALT 10/03/2024 25     Hemoglobin A1C 10/03/2024 7.2 (H)     Estimated Average Glucose 10/03/2024 160      Physical Exam  CONSTITUTIONAL - well nourished, well developed, looks like stated age, in no acute distress, not ill-appearing, and not tired appearing  SKIN - age and sun related skin changes, cyanotic lower extremities  HEAD - no trauma, normocephalic  EYES - EOMI, anicteric sclera  CHEST - bilateral crackles, increased effort  CARDIAC - regular rate and regular rhythm, no skipped beats, no murmur  ABDOMEN - no organomegaly, soft, nontender, nondistended, normal bowel sounds, no guarding/rebound/rigidity  EXTREMITIES - bilateral LE edema, cyanotic lower extremities  NEUROLOGICAL - alert, oriented and no focal signs  PSYCHIATRIC - alert, pleasant and cordial, age-appropriate    Assessment/Plan   Heart Failure  - I have recommended that you immediately go to [facility] Emergency Room for further evaluation and treatment for possible heart failure.  - Please contact the office with any questions or concerns regarding moving forward with your care.

## 2024-10-07 NOTE — ED PROVIDER NOTES
Emergency Department Provider Note        History of Present Illness     History provided by: Patient and Family Member  Limitations to History: None  External Records Reviewed with Brief Summary:  Outpatient primary care physician note from today, 10/7/2024 noting significant shortness of breath and bilateral lower extremity edema, advised to present to the emergency department for further workup with concerns of potential CHF    HPI:  Gen Solis II is a 69 y.o. male with prior history of hypertension, and HLD presenting to the emergency department with increased shortness of breath and bilateral lower extremity edema.  Patient states that shortness of breath has been getting worse over the last few months, but has been significantly worse for the last week.  [facility] [place] and noted his symptoms of shortness of breath were significantly worse after that.  Noted that he usually has swelling in his right lower extremity but it also seem to be worse than usual.  Declines any fevers, chills, but did note productive cough without hemoptysis.  He does not have any prior history of pulmonary emboli and is not on any blood thinners.  States that he was previously deferred surgery on his shoulders in the setting of his poor health, and was advised to follow-up with his cardiologist for further workup.    Physical Exam   Triage vitals:  T 36 °C (96.8 °F)  HR 70  BP (!) 210/100  RR 20  O2 (!) 91 % None (Room air)    GEN:  A&Ox3, no acute distress, appears uncomfortable. Conversational and appropriate.    HEENT: Normocephalic, atraumatic. Conjunctiva pink with no redness or exudates. Hearing grossly intact. Moist mucous membranes.  CARDIO: Normal rate and regular rhythm. Normal S1, S2  without murmurs, rubs, or gallops.   PULM: Expiratory wheezes noted, with additional accessory muscle use.  Saturating above 95% on room air.  Speaking in full sentences.  No stridor.  No crackles  noted.    GI: Soft, non-tender, distended. No rebound tenderness or guarding.   SKIN: Warm and dry, no rashes, lesions, petechiae, or purpura.  MSK: 3+ pitting edema in the bilateral lower extremities with significant swelling of the right lower extremity with erythema and tenderness to palpation of the right calf.  Significantly limited range of motion of the shoulders in the setting of prior shoulder injuries and surgery.  2+ pulses in the bilateral lower extremities which are both warm and well-perfused.  NEURO: No focal findings identified. No confusion or gross mental status changes.  PSYCH: Appropriate mood and behavior, converses and responds appropriately during exam.    Medical Decision Making & ED Course   Medical Decision Makin y.o. male with prior history of hypertension, and HLD presenting to the emergency department with increased shortness of breath and bilateral lower extremity edema in the setting of COVID-19 and likely fluid overload.  Patient was given IV Lasix, noted improvement of his breathing and improvement of his blood pressure.  CT angio was performed did not show any acute findings suggestive of pulmonary embolism, in addition to DVT ultrasound which was also unremarkable for any thrombi.  I discussed these results with the patient, advised that he should come in for further workup for heart failure and diuresis.  Patient was agreeable to this given that he felt much improved.  Discussed with the admitting team and was accepted for further management.  Remained hemodynamically stable in the ED on room air.    ----      Differential diagnoses considered include but are not limited to: Congestive heart failure, fluid overload, MI, viral versus bacterial pneumonia/COVID-19, pulmonary embolism     Social Determinants of Health which Significantly Impact Care: None identified     EKG Independent Interpretation: EKG interpreted by myself. Please see ED Course for full  interpretation.    Independent Result Review and Interpretation: Relevant laboratory and radiographic results were reviewed and independently interpreted by myself.  As necessary, they are commented on in the ED Course.    Chronic conditions affecting the patient's care: As documented above in [facility]    The patient was discussed with the following consultants/services: None    Care Considerations: As documented above in [facility]    ED Course:  ED Course as of 10/07/24 2145   Mon Oct 07, 2024   1300 ECG 12 lead  Initial EKG showing normal sinus rhythm without any acute ST elevation. [AD]   1339 Coronavirus 2019, PCR(!): Detected [AD]   1420 Has unilateral leg swelling and tenderness to palpation, will order ultrasound of the lower extremity, in addition to CT PE to rule out pulmonary embolism given his significant shortness of breath [AD]   1420 BNP: 49  Likely falsely given body habitus.  Will order Lasix, and diuresis given fluid overload on chest x-ray. [AD]   1421 Point of Care Ultrasound  Point-of-care ultrasound showing some B-lines bilaterally, blood pressure now 180s -190s/ 90-80's.  ? Pericardial effusion, although difficult to assess cardiac views. [AD]   1448 Notified that patient is having arm pain thus put in for Tylenol, repeat EKG was performed showing ST segment flattening, however overall no STEMI.  Will repeat troponin. [AD]   1535 Vascular US lower extremity venous duplex bilateral  No DVT noted. [AD]   1615 CT angio chest for pulmonary embolism  ? RLL infiltrate  [AD]      ED Course User Index  [AD] Denny Hernandez, DO         Diagnoses as of 10/07/24 2145   Shortness of breath   COVID-19   Hypervolemia, unspecified hypervolemia type   Right leg swelling     Disposition   As a result of their workup, the patient will require admission to the hospital.  The patient was informed of his diagnosis.  The patient was given the opportunity to ask questions and I answered them. The patient agreed to be admitted  to the hospital.    Procedures   Procedures    Patient seen and discussed with ED attending physician.    Denny Devries DO  Emergency Medicine       Denny Devries DO  Resident  10/07/24 5995

## 2024-10-07 NOTE — H&P
[facility]  Department of Hospital Medicine    HISTORY AND PHYSICAL    Chief Complaint   Patient presents with    Shortness of Breath     Pt c/o SOB, leg swelling. Pt was on a trip last week and had a cough and congestion with body aches.         History Of Present Illness  Gen Solis II is a 69 y.o. male with hypertension, class III obesity, hyperlipidemia, JAMIL.  He presented with cough, shortness of breath, leg swelling after recent cross-country flight.  Endorsed worsening leg swelling with weeping edema and weight gain over months. Found to have COVID.  DVT ultrasound in the ED was negative.  Diuresed with IV Lasix and admitted to observation for heart failure workup.    At time of admission, legs are significantly less painful and already less swollen after receiving Lasix and having very good urine output in the ED.  No chest pain.  Endorsed dyspnea on exertion but denied orthopnea.  Went to [place] 2 weeks ago and came back with a cough and sore throat which has been improving.  Normal     Past Medical History  He has a past medical history of Anesthesia, Anxiety, Hyperlipidemia, Hypertension, Low testosterone in male, Obesity, and Sleep apnea.    He has no past medical history of Asthma, Awareness under anesthesia, COPD (chronic obstructive pulmonary disease) (Multi), Delayed emergence from general anesthesia, Diabetes mellitus type I (Multi), GERD (gastroesophageal reflux disease), Hard to intubate, Malignant hyperthermia, PONV (postoperative nausea and vomiting), or Type 2 diabetes mellitus.    Surgical History  He has a past surgical history that includes Cataract extraction (Bilateral); Appendectomy; Cholecystectomy; Nasal septum surgery; Vasectomy; and Knee surgery (Right).     Social History  He reports that he quit smoking about 27 years ago. His smoking use included cigarettes. He has never been exposed to tobacco smoke. He has never used smokeless  "tobacco. He reports current alcohol use. He reports current drug use. Drug: Marijuana.    Family History    Hypertension     Allergies  Bee venom protein (honey bee), Codeine, Penicillins, and Latex    Review of systems  11-point ROS was performed and is negative except as noted in the HPI.     Physical Exam   CON: awake, alert, mild distress;   EYES: conjunctiva wnl; PERRL; EOMI  ENMT: hearing intact; MMM;   NECK: symmetric; thyroid and cervical nodes wnl;   CV: S1 S2 - RRR with no m/g/r; 2+ pitting bilateral lower extremity edema; elevated JVP; symmetric pulses  RESP: normal work of breathing; lungs CTAB;   GI: abdomen nontender; no organomegaly;   SKIN: no induration; venous stasis changes of lower extremities and dry ulcerations of anterior shins  MSK: ROM wnl; digits wnl;   NEURO: language and speech wnl; sensation and motor function grossly intact   PSYCH: oriented to situation; affect normal;     Last Recorded Vitals  Blood pressure 164/74, pulse 66, temperature 36 °C (96.8 °F), resp. rate 20, height 1.803 m (5' 11\"), weight 136 kg (300 lb), SpO2 95%.    Relevant Results  Lab Results   Component Value Date    WBC 6.2 10/07/2024    HGB 17.1 10/07/2024    HCT 56.3 (H) 10/07/2024    MCV 77 (L) 10/07/2024     10/07/2024      Lab Results   Component Value Date    GLUCOSE 112 (H) 10/07/2024    CALCIUM 9.3 10/07/2024     (L) 10/07/2024    K 4.2 10/07/2024    CO2 30 10/07/2024    CL 99 10/07/2024    BUN 13 10/07/2024    CREATININE 0.95 10/07/2024        Scheduled medications:  acetaminophen, 650 mg, oral, q6h  [START ON 10/8/2024] enoxaparin, 40 mg, subcutaneous, Daily  escitalopram, 10 mg, oral, Daily  multivitamin with minerals iron-free, 1 tablet, oral, Daily  simvastatin, 20 mg, oral, Daily  valsartan, 320 mg, oral, Daily      Continuous medications:     PRN medications:  PRN medications: famotidine, melatonin, ondansetron, oxygen, sennosides-docusate sodium                Assessment/Plan   Principal " Problem:    VINICIO Solis II is a 69 y.o. male with hypertension, class III obesity, hyperlipidemia, JAMIL.  He presented with cough, shortness of breath, leg swelling after recent cross-country flight.  Endorsed worsening leg swelling with weeping edema and weight gain over months. Found to have COVID.  DVT ultrasound in the ED was negative.  Diuresed with IV Lasix and admitted to observation for heart failure workup.    New acute decompensated CHF:  Hypertension:  -Cardiology consulted  -Echo  -Telemetry  -Diuresis  -Trend renal function and electrolytes  -Leg elevation  -Continue ARB    COVID:  > No hypoxia.  Outside of the window for remdesivir treatment.  -Supportive care    DVT PPx: Enoxaparin    Dispo: Observation, likely 1 to 2 days       Ralf Talavera MD    Patient Name:  Gen Solis II   MRN:   [id number]   [facility]

## 2024-10-07 NOTE — PROGRESS NOTES
Subjective   Patient ID: Gen Solis II is a 69 y.o. male who presents for Leg Swelling.         Reviewed all medications by prescribing practitioner or clinical pharmacist (such as prescriptions, OTCs, herbal therapies and supplements) and documented in the medical record.    HPI  Leg Swelling   Past medical history significant for sleep apnea which he does not treat, hypertension, hyperlipidemia.  Gen has had bilateral leg swelling for two weeks since travelling to [place]. He was supposed to undergo total R shoulder arthroplasty on 10/10 however, during his pre-op evaluation, his surgery was cancelled due to leg swelling, shortness of breath, and cough. He states that his L leg has been swollen since injuring his L first digit months ago. He contracted a cough since his trip to [place] two weeks ago. Furthermore, today he states that his shortness of breath has gotten worse compared to days past. He also states that he has some chest tightness.  Denies dizziness, weakness, confusion, palpitations, abdominal pain, postprandial pain, bowel/bladder changes.    Review of Systems  All pertinent positive symptoms are included in the history of present illness.  All other systems have been reviewed and are negative and noncontributory to this patient's current ailments.    Past Medical History:   Diagnosis Date    Anesthesia     anger, agitated , slighly violent    Anxiety     Hyperlipidemia     Hypertension     Low testosterone in male     Obesity     Sleep apnea     does juhi use any device     Past Surgical History:   Procedure Laterality Date    APPENDECTOMY      CATARACT EXTRACTION Bilateral     CHOLECYSTECTOMY      KNEE SURGERY Right     meniscus repair    NASAL SEPTUM SURGERY      VASECTOMY       Social History     Tobacco Use    Smoking status: Former     Current packs/day: 0.00     Types: Cigarettes     Quit date:      Years since quittin.7     Passive exposure: Never    Smokeless tobacco:  "Never   Vaping Use    Vaping status: Never Used   Substance Use Topics    Alcohol use: Yes     Comment: occ    Drug use: Yes     Types: Marijuana     Comment: on vacation, last use sunday     No family history on file.  Immunization History   Administered Date(s) Administered    DTaP vaccine, pediatric  (INFANRIX) 04/15/2015     Current Outpatient Medications   Medication Instructions    acetaminophen (TylenoL) 325 mg tablet oral, Every 6 hours    chlorhexidine (Peridex) 0.12 % solution 15 mL, Mouth/Throat, As needed    escitalopram (LEXAPRO) 10 mg, oral, Daily    multivitamin with minerals iron-free (Centrum Silver) 1 tablet, oral, Daily    olmesartan (BENICAR) 40 mg, oral, Daily    simvastatin (ZOCOR) 20 mg, oral, Daily    testosterone 20.25 mg/1.25 gram (1.62 %) gel in metered-dose pump use 2 pumps EVERY DAY AS DIRECTED     Allergies   Allergen Reactions    Bee Venom Protein (Honey Bee) Swelling    Codeine Other    Penicillins Unknown     Makes nerve endings tingle; tolerating amp/sulbactam 1/20/2023    Latex Rash and Unknown     Objective   Vitals:    10/07/24 1053   BP: (!) 178/104   Pulse: 72   Temp: 37.2 °C (98.9 °F)   SpO2: 93%   Weight: (!) 152 kg (336 lb)   Height: 1.803 m (5' 11\")     Body mass index is 46.86 kg/m².    BP Readings from Last 3 Encounters:   10/07/24 (!) 210/100   10/07/24 (!) 178/104   10/03/24 146/71      Wt Readings from Last 3 Encounters:   10/07/24 136 kg (300 lb)   10/07/24 (!) 152 kg (336 lb)   10/03/24 (!) 156 kg (343 lb 14.7 oz)      Admission on 10/07/2024   Component Date Value    WBC 10/07/2024 6.2     nRBC 10/07/2024 0.0     RBC 10/07/2024 7.28 (H)     Hemoglobin 10/07/2024 17.1     Hematocrit 10/07/2024 56.3 (H)     MCV 10/07/2024 77 (L)     MCH 10/07/2024 23.5 (L)     MCHC 10/07/2024 30.4 (L)     RDW 10/07/2024 18.0 (H)     Platelets 10/07/2024 281     Neutrophils % 10/07/2024 59.2     Immature Granulocytes %,* 10/07/2024 1.4 (H)     Lymphocytes % 10/07/2024 21.1     " Monocytes % 10/07/2024 13.3     Eosinophils % 10/07/2024 3.9     Basophils % 10/07/2024 1.1     Neutrophils Absolute 10/07/2024 3.68     Immature Granulocytes Ab* 10/07/2024 0.09     Lymphocytes Absolute 10/07/2024 1.31     Monocytes Absolute 10/07/2024 0.83     Eosinophils Absolute 10/07/2024 0.24     Basophils Absolute 10/07/2024 0.07    Pre-Admission Testing on 10/03/2024   Component Date Value    WBC 10/03/2024 7.3     nRBC 10/03/2024 0.0     RBC 10/03/2024 6.79 (H)     Hemoglobin 10/03/2024 16.2     Hematocrit 10/03/2024 53.2 (H)     MCV 10/03/2024 78 (L)     MCH 10/03/2024 23.9 (L)     MCHC 10/03/2024 30.5 (L)     RDW 10/03/2024 17.9 (H)     Platelets 10/03/2024 286     Neutrophils % 10/03/2024 72.7     Immature Granulocytes %,* 10/03/2024 0.3     Lymphocytes % 10/03/2024 12.8     Monocytes % 10/03/2024 10.7     Eosinophils % 10/03/2024 2.7     Basophils % 10/03/2024 0.8     Neutrophils Absolute 10/03/2024 5.32     Immature Granulocytes Ab* 10/03/2024 0.02     Lymphocytes Absolute 10/03/2024 0.94 (L)     Monocytes Absolute 10/03/2024 0.78     Eosinophils Absolute 10/03/2024 0.20     Basophils Absolute 10/03/2024 0.06     Glucose 10/03/2024 131 (H)     Sodium 10/03/2024 136     Potassium 10/03/2024 5.1     Chloride 10/03/2024 102     Bicarbonate 10/03/2024 30     Anion Gap 10/03/2024 9 (L)     Urea Nitrogen 10/03/2024 12     Creatinine 10/03/2024 1.16     eGFR 10/03/2024 69     Calcium 10/03/2024 9.2     Albumin 10/03/2024 3.9     Alkaline Phosphatase 10/03/2024 35     Total Protein 10/03/2024 6.4     AST 10/03/2024 27     Bilirubin, Total 10/03/2024 1.0     ALT 10/03/2024 25     Hemoglobin A1C 10/03/2024 7.2 (H)     Estimated Average Glucose 10/03/2024 160      Physical Exam  CONSTITUTIONAL - well nourished, well developed, looks like stated age, in no acute distress, not ill-appearing, and not tired appearing  SKIN - age and sun related skin changes, cyanotic lower extremities  HEAD - no trauma,  normocephalic  EYES - EOMI, anicteric sclera  CHEST - bilateral crackles, increased effort  CARDIAC - regular rate and regular rhythm, no skipped beats, no murmur  ABDOMEN - no organomegaly, soft, nontender, nondistended, normal bowel sounds, no guarding/rebound/rigidity  EXTREMITIES - bilateral LE edema, cyanotic lower extremities  NEUROLOGICAL - alert, oriented and no focal signs  PSYCHIATRIC - alert, pleasant and cordial, age-appropriate    Assessment/Plan   Heart Failure  - I have recommended that you immediately go to [facility] Emergency Room for further evaluation and treatment for possible heart failure.  - Please contact the office with any questions or concerns regarding moving forward with your care.     I agreed with plan    Olive Devries. MD  Family medicine resident  PGY3

## 2024-10-07 NOTE — ED TRIAGE NOTES
Pt c/o SOB, leg swelling. Pt was on a trip last week and had a cough and congestion with body aches

## 2024-10-08 ENCOUNTER — APPOINTMENT (OUTPATIENT)
Dept: CARDIOLOGY | Facility: HOSPITAL | Age: 69
End: 2024-10-08
Payer: MEDICARE

## 2024-10-08 LAB
ANION GAP SERPL CALC-SCNC: 11 MMOL/L (ref 10–20)
AORTIC VALVE MEAN GRADIENT: 5.4 MMHG
AORTIC VALVE PEAK VELOCITY: 1.67 M/S
AV PEAK GRADIENT: 11.1 MMHG
AVA (PEAK VEL): 2.04 CM2
AVA (VTI): 2.71 CM2
BUN SERPL-MCNC: 14 MG/DL (ref 6–23)
CALCIUM SERPL-MCNC: 9.1 MG/DL (ref 8.6–10.3)
CHLORIDE SERPL-SCNC: 99 MMOL/L (ref 98–107)
CO2 SERPL-SCNC: 28 MMOL/L (ref 21–32)
CREAT SERPL-MCNC: 0.93 MG/DL (ref 0.5–1.3)
EGFRCR SERPLBLD CKD-EPI 2021: 90 ML/MIN/1.73M*2
EJECTION FRACTION APICAL 4 CHAMBER: 61.8
EJECTION FRACTION: 68 %
GLUCOSE SERPL-MCNC: 121 MG/DL (ref 74–99)
LEFT ATRIUM VOLUME AREA LENGTH INDEX BSA: 25.5 ML/M2
LEFT VENTRICULAR OUTFLOW TRACT DIAMETER: 2.08 CM
MAGNESIUM SERPL-MCNC: 1.7 MG/DL (ref 1.6–2.4)
MITRAL VALVE E/A RATIO: 1.04
POTASSIUM SERPL-SCNC: 4 MMOL/L (ref 3.5–5.3)
RIGHT VENTRICLE FREE WALL PEAK S': 12 CM/S
RIGHT VENTRICLE PEAK SYSTOLIC PRESSURE: 8.7 MMHG
SODIUM SERPL-SCNC: 134 MMOL/L (ref 136–145)
TRICUSPID ANNULAR PLANE SYSTOLIC EXCURSION: 1.5 CM

## 2024-10-08 PROCEDURE — 2500000004 HC RX 250 GENERAL PHARMACY W/ HCPCS (ALT 636 FOR OP/ED): Performed by: STUDENT IN AN ORGANIZED HEALTH CARE EDUCATION/TRAINING PROGRAM

## 2024-10-08 PROCEDURE — 93306 TTE W/DOPPLER COMPLETE: CPT

## 2024-10-08 PROCEDURE — 83735 ASSAY OF MAGNESIUM: CPT | Performed by: STUDENT IN AN ORGANIZED HEALTH CARE EDUCATION/TRAINING PROGRAM

## 2024-10-08 PROCEDURE — 99232 SBSQ HOSP IP/OBS MODERATE 35: CPT | Performed by: FAMILY MEDICINE

## 2024-10-08 PROCEDURE — 2500000001 HC RX 250 WO HCPCS SELF ADMINISTERED DRUGS (ALT 637 FOR MEDICARE OP): Performed by: NURSE PRACTITIONER

## 2024-10-08 PROCEDURE — 36415 COLL VENOUS BLD VENIPUNCTURE: CPT | Performed by: STUDENT IN AN ORGANIZED HEALTH CARE EDUCATION/TRAINING PROGRAM

## 2024-10-08 PROCEDURE — 2500000004 HC RX 250 GENERAL PHARMACY W/ HCPCS (ALT 636 FOR OP/ED): Performed by: NURSE PRACTITIONER

## 2024-10-08 PROCEDURE — 2500000001 HC RX 250 WO HCPCS SELF ADMINISTERED DRUGS (ALT 637 FOR MEDICARE OP): Performed by: STUDENT IN AN ORGANIZED HEALTH CARE EDUCATION/TRAINING PROGRAM

## 2024-10-08 PROCEDURE — 93306 TTE W/DOPPLER COMPLETE: CPT | Performed by: STUDENT IN AN ORGANIZED HEALTH CARE EDUCATION/TRAINING PROGRAM

## 2024-10-08 PROCEDURE — 93005 ELECTROCARDIOGRAM TRACING: CPT

## 2024-10-08 PROCEDURE — 1200000002 HC GENERAL ROOM WITH TELEMETRY DAILY

## 2024-10-08 PROCEDURE — 2500000002 HC RX 250 W HCPCS SELF ADMINISTERED DRUGS (ALT 637 FOR MEDICARE OP, ALT 636 FOR OP/ED): Performed by: STUDENT IN AN ORGANIZED HEALTH CARE EDUCATION/TRAINING PROGRAM

## 2024-10-08 PROCEDURE — 80048 BASIC METABOLIC PNL TOTAL CA: CPT | Performed by: STUDENT IN AN ORGANIZED HEALTH CARE EDUCATION/TRAINING PROGRAM

## 2024-10-08 RX ORDER — AMLODIPINE BESYLATE 5 MG/1
5 TABLET ORAL DAILY
Status: DISCONTINUED | OUTPATIENT
Start: 2024-10-08 | End: 2024-10-09

## 2024-10-08 RX ORDER — LABETALOL HYDROCHLORIDE 5 MG/ML
20 INJECTION, SOLUTION INTRAVENOUS ONCE
Status: COMPLETED | OUTPATIENT
Start: 2024-10-08 | End: 2024-10-08

## 2024-10-08 RX ADMIN — SIMVASTATIN 20 MG: 20 TABLET, FILM COATED ORAL at 09:37

## 2024-10-08 RX ADMIN — Medication 1 APPLICATION: at 09:38

## 2024-10-08 RX ADMIN — ACETAMINOPHEN 650 MG: 325 TABLET, FILM COATED ORAL at 04:45

## 2024-10-08 RX ADMIN — LABETALOL HYDROCHLORIDE 20 MG: 5 INJECTION, SOLUTION INTRAVENOUS at 01:22

## 2024-10-08 RX ADMIN — ENOXAPARIN SODIUM 40 MG: 40 INJECTION SUBCUTANEOUS at 09:37

## 2024-10-08 RX ADMIN — ACETAMINOPHEN 650 MG: 325 TABLET, FILM COATED ORAL at 21:40

## 2024-10-08 RX ADMIN — ESCITALOPRAM OXALATE 10 MG: 10 TABLET ORAL at 09:37

## 2024-10-08 RX ADMIN — Medication 1 APPLICATION: at 21:38

## 2024-10-08 RX ADMIN — VALSARTAN 320 MG: 160 TABLET, FILM COATED ORAL at 09:37

## 2024-10-08 RX ADMIN — FUROSEMIDE 40 MG: 10 INJECTION, SOLUTION INTRAMUSCULAR; INTRAVENOUS at 09:37

## 2024-10-08 RX ADMIN — AMLODIPINE BESYLATE 5 MG: 5 TABLET ORAL at 15:00

## 2024-10-08 RX ADMIN — ACETAMINOPHEN 650 MG: 325 TABLET, FILM COATED ORAL at 16:24

## 2024-10-08 RX ADMIN — Medication 1 TABLET: at 09:37

## 2024-10-08 RX ADMIN — ACETAMINOPHEN 650 MG: 325 TABLET, FILM COATED ORAL at 10:35

## 2024-10-08 ASSESSMENT — PAIN SCALES - GENERAL
PAINLEVEL_OUTOF10: 0 - NO PAIN
PAINLEVEL_OUTOF10: 0 - NO PAIN

## 2024-10-08 ASSESSMENT — COGNITIVE AND FUNCTIONAL STATUS - GENERAL
TURNING FROM BACK TO SIDE WHILE IN FLAT BAD: A LITTLE
CLIMB 3 TO 5 STEPS WITH RAILING: A LITTLE
MOVING TO AND FROM BED TO CHAIR: A LITTLE
STANDING UP FROM CHAIR USING ARMS: A LITTLE
DRESSING REGULAR UPPER BODY CLOTHING: A LITTLE
WALKING IN HOSPITAL ROOM: A LITTLE
DRESSING REGULAR LOWER BODY CLOTHING: A LITTLE
DAILY ACTIVITIY SCORE: 20
TOILETING: A LITTLE
HELP NEEDED FOR BATHING: A LITTLE
MOBILITY SCORE: 19

## 2024-10-08 ASSESSMENT — ENCOUNTER SYMPTOMS
PALPITATIONS: 0
COUGH: 1
SHORTNESS OF BREATH: 1
ABDOMINAL DISTENTION: 0
ABDOMINAL PAIN: 0
DIZZINESS: 0
WEAKNESS: 0

## 2024-10-08 NOTE — NURSING NOTE
Patient seen sitting at bedside with staff. Discussed with RNDeb the digital images, discolored extremities, dryness noted.  Lac Hydrin previously on order and in use.  No open wounds per RN, no wound care needs.  Encouraged elevations when possible, RN will pass information to patient.  Message with questions.

## 2024-10-08 NOTE — PROGRESS NOTES
Gen Solis II is a 69 y.o. male on day 1 of admission presenting with CHF (congestive heart failure), NYHA class I, acute on chronic, combined.      Subjective   Patient doing well and reports lower extremity edema and shortness of breath are improving       Objective     Last Recorded Vitals  /67 (BP Location: Left arm, Patient Position: Lying)   Pulse 61   Temp 36.4 °C (97.5 °F) (Temporal)   Resp 20   Wt 143 kg (314 lb 6.4 oz)   SpO2 92%   Intake/Output last 3 Shifts:    Intake/Output Summary (Last 24 hours) at 10/8/2024 0758  Last data filed at 10/8/2024 0517  Gross per 24 hour   Intake 120 ml   Output 150 ml   Net -30 ml       Admission Weight  Weight: 136 kg (300 lb) (10/07/24 1219)    Daily Weight  10/07/24 : 143 kg (314 lb 6.4 oz)    Image Results  CT angio chest for pulmonary embolism  Narrative: Interpreted By:  Arianne Acosta,   STUDY:  CT ANGIO CHEST FOR PULMONARY EMBOLISM;  10/7/2024 3:51 pm      INDICATION:  Signs/Symptoms:r/o PE.          COMPARISON:  11/13/2022      ACCESSION NUMBER(S):  [id number]      ORDERING CLINICIAN:  UMAIR HARRISON      TECHNIQUE:  CT of the chest was performed. Sagittal and coronal reconstructions  were generated. 90 cc Omnipaque 350 intravenous contrast given for  the examination.  Multiplanar reconstructions of the pulmonary  vessels were created on an independent workstation and provided for  review.      FINDINGS:          CHEST WALL AND LOWER NECK: No significant axillary lymphadenopathy.      MEDIASTINUM AND SABI:  Mild gaseous distention of the proximal  esophagus. No significant mediastinal or hilar lymphadenopathy.      HEART AND VESSELS:  No central PE however limited assessment for  peripheral PE due to timing of IV contrast bolus and motion artifact.  The heart is normal in size. No significant pericardial effusion.  Atherosclerotic calcifications including the coronary arteries.      LUNGS, PLEURA, LARGE AIRWAYS: Small patchy infiltrate in  the anterior  right lung base. Mild pulmonary heterogeneity/mosaic attenuation.  Subcentimeter calcified granuloma in the left mid chest. No confluent  airspace opacity or pleural effusion. Thin bilateral pleural  calcifications/plaques similar to the prior exam. The central airways  are patent.      UPPER ABDOMEN:  The included liver is mildly hypodense/fatty  infiltrated.      BONES:  No focal concerning lytic or blastic osseous lesion.  Degenerative endplate spurring in the visualized spine.      Impression: No central PE however limited assessment for peripheral PE due to  technical factors.      Small infiltrate at the right lung base. Mild background pulmonary  heterogeneity which could reflect mosaic attenuation related to small  airways disease and air trapping. Follow-up to ensure resolution  after appropriate treatment recommended.      Small bilateral pleural calcifications/plaques which may be seen with  remote asbestos exposure.      Probable diffuse fatty infiltration of the liver incidentally noted.      MACRO:  None.      Signed by: Arianne Acosta 10/7/2024 4:12 PM  Dictation workstation:   [id number]  Vascular US lower extremity venous duplex bilateral  Narrative: Interpreted By:  [place],   STUDY:  [facility] US LOWER EXTREMITY VENOUS DUPLEX BILATERAL; 10/7/2024 2:38 pm      INDICATION:  Signs/Symptoms:r/o DVT ; travel with swelling in the LLE+ pain.      COMPARISON:  None.      ACCESSION NUMBER(S):  [id number]      ORDERING CLINICIAN:  UMAIR HARRISON      TECHNIQUE:  Vascular ultrasound of the left lower extremity was performed. Real  time compression views as well as Gray scale, color Doppler and  spectral Doppler waveform analysis was performed.      FINDINGS:  Evaluation of the visualized portions of the left common femoral  vein, proximal, mid, and distal femoral vein, and popliteal vein were  performed.  Evaluation of the visualized portions of the posterior  tibial and peroneal  veins were also performed.  In addition,  evaluation of the contralateral common femoral vein was performed.      Limitations: None      The evaluated veins demonstrate normal compressibility. There is  intact venous flow demonstrating normal respiratory variability and  normal augmentation of flow with calf compression. Therefore, there  is no ultrasonographic evidence for deep vein thrombosis within the  evaluated veins. No evidence of thrombus is seen within the  contralateral common femoral vein.      Impression: No sonographic evidence for deep vein thrombosis within the evaluated  veins of the left lower extremity.      MACRO:  None      Signed by: Keny [place] 10/7/2024 2:50 PM  Dictation workstation:   [id number]  XR chest 1 view  Narrative: Interpreted By:  Pradeep Dunn,   STUDY:  XR CHEST 1 VIEW; 10/7/2024 1:00 pm      INDICATION:  Signs/Symptoms:sob chf.      COMPARISON:  08/24/2021      ACCESSION NUMBER(S):  [id number]      ORDERING CLINICIAN:  UMAIR HARRISON      TECHNIQUE:  1 view of the chest was performed.      FINDINGS:  The lungs are adequately inflated. No acute consolidation. No pleural  effusion. No pneumothorax. Slight prominence of the interstitium may  reflect congestive changes however no evidence for overt pulmonary  edema. The cardiomediastinal silhouette is within normal limits.      Impression: Possible congestive changes however no overt pulmonary edema. No  definitive acute cardiopulmonary disease.      Signed by: Pradeep Dunn 10/7/2024 1:15 PM  Dictation workstation:   [id number]      Physical Exam    CON: awake, alert, mild distress;   EYES: conjunctiva wnl; PERRL; EOMI  ENMT: hearing intact; MMM;   NECK: symmetric; thyroid and cervical nodes wnl;   CV: S1 S2 - RRR with no m/g/r; 2+ pitting bilateral lower extremity edema; elevated JVP; symmetric pulses  RESP: normal work of breathing; lungs CTAB;   GI: abdomen nontender; no organomegaly;   SKIN: no  induration; venous stasis changes of lower extremities and dry ulcerations of anterior shins, stage I pitting edema of bilateral lower extremities from mid shin to ankle  MSK: ROM wnl; digits wnl;   NEURO: language and speech wnl; sensation and motor function grossly intact   PSYCH: oriented to situation; affect normal;       Assessment/Plan      Gen Solis II is a 69 y.o. male with hypertension, class III obesity, hyperlipidemia, JAMIL.  He presented with cough, shortness of breath, leg swelling after recent cross-country flight and reported Endorsed worsening leg swelling with weeping edema and weight gain over months. Found to have COVID.  DVT ultrasound in the ED was negative.  Diuresed with IV Lasix and admitted to observation for heart failure workup.     New acute decompensated CHF:  ECHO pending  Net output of 30cc documented since admission  / will add strict I's&O's  Continue lasix 40mg IV daily  Cardiology consulted  Continue Telemetr  Trend renal function and electrolytes  Leg elevation  Continue ARB     COVID:  No hypoxia.  Outside of the window for remdesivir treatment.  Supportive care     DVT PPx: Enoxaparin     Dispo: Observation, likely 1 to 2 days    Андрей Hensley DO

## 2024-10-08 NOTE — CARE PLAN
The patient's goals for the shift include  patient will remain comfortable throughout shift.     The clinical goals for the shift include to rest comfortably

## 2024-10-08 NOTE — CARE PLAN
The patient's goals for the shift include      The clinical goals for the shift include to rest comfortably      Problem: Pain - Adult  Goal: Verbalizes/displays adequate comfort level or baseline comfort level  Outcome: Progressing     Problem: Safety - Adult  Goal: Free from fall injury  Outcome: Progressing     Problem: Heart Failure  Goal: Improved urinary output this shift  Outcome: Progressing  Goal: Report improvement of dyspnea/breathlessness this shift  Outcome: Progressing  Goal: Increase self care and/or family involvement in 24 hours  Outcome: Progressing     Problem: Pain  Goal: Turns in bed with improved pain control throughout the shift  Outcome: Progressing  Goal: Walks with improved pain control throughout the shift  Outcome: Progressing  Goal: Performs ADL's with improved pain control throughout shift  Outcome: Progressing     Problem: Fall/Injury  Goal: Not fall by end of shift  Outcome: Progressing  Goal: Be free from injury by end of the shift  Outcome: Progressing  Goal: Verbalize understanding of personal risk factors for fall in the hospital  Outcome: Progressing  Goal: Verbalize understanding of risk factor reduction measures to prevent injury from fall in the home  Outcome: Progressing

## 2024-10-08 NOTE — CONSULTS
Inpatient consult to Cardiology  Consult performed by: DIDI Quintanilla-CNP  Consult ordered by: Ralf Talavera MD  Reason for consult: chf        History Of Present Illness  Gen Solis II is a 69 y.o. male presenting with complaints of shortness of breath and lower extremity edema. He states he started noticing shortness of breath while he was in [place]. Then the leg swelling started when he returned. He denies any chest pain, dizziness, or palpitations. Denies any cardiac history.     Lab work in the ER showed glucose 112, sodium 134, potassium 4.2, BUN/Cr 13/0.95, magnesium 1.63, BNP 49, troponin negative, WBC 6.2, H&H 17.1/56.3, COVID positive, BLE US negative for DVT, CXR showed possible congestive changes, CTA of the chest negative for PE, Small infiltrate at the right lung base. Mild background pulmonary heterogeneity which could reflect mosaic attenuation related to small airways disease and air trapping.    EKG showed SR, no acute ischemic changes.       Past Medical History  Past Medical History:   Diagnosis Date    Anesthesia     anger, agitated , slighly violent    Anxiety     Contusion of lung 10/07/2024    Fracture of multiple ribs 10/07/2024    Fracture of transverse process of lumbar vertebra (Multi) 10/07/2024    Hyperlipidemia     Hypertension     Low testosterone in male     Obesity     Sleep apnea     does juhi use any device       Surgical History  Past Surgical History:   Procedure Laterality Date    APPENDECTOMY      CATARACT EXTRACTION Bilateral     CHOLECYSTECTOMY      KNEE SURGERY Right     meniscus repair    NASAL SEPTUM SURGERY      VASECTOMY          Social History  He reports that he quit smoking about 27 years ago. His smoking use included cigarettes. He has never been exposed to tobacco smoke. He has never used smokeless tobacco. He reports current alcohol use. He reports current drug use. Drug: Marijuana.    Family History  No family history on file.     Allergies  Bee  "venom protein (honey bee), Codeine, Penicillins, and Latex    Review of Systems  Review of Systems   Respiratory:  Positive for cough and shortness of breath.    Cardiovascular:  Positive for leg swelling. Negative for chest pain and palpitations.   Gastrointestinal:  Negative for abdominal distention and abdominal pain.   Neurological:  Negative for dizziness and weakness.          Physical Exam  Constitutional: Well developed, awake/alert/oriented x3, no distress, alert and cooperative  Eyes: PERRL, EOMI, clear sclera  ENMT: mucous membranes moist, no apparent injury, no lesions seen  Head/Neck: Neck supple, no apparent injury, thyroid without mass or tenderness, No JVD, trachea midline, no bruits  Respiratory/Thorax: Patent airways, diminished throughout with good chest expansion, thorax symmetric  Cardiovascular: Regular, rate and rhythm, no murmurs, 2+ equal pulses of the extremities, normal S 1and S 2  Gastrointestinal: Nondistended, soft, non-tender, no rebound tenderness or guarding, no masses palpable, no organomegaly, +BS, no bruits  Musculoskeletal: ROM intact, no joint swelling, normal strength  Extremities: tight and swollen  Neurological: alert and oriented x3, intact senses, motor, response and reflexes, normal strength  Lymphatic: No significant lymphadenopathy  Psychological: Appropriate mood and behavior  Skin: Warm and dry, no lesions, no rashes       Last Recorded Vitals  Blood pressure 160/72, pulse 65, temperature 36.4 °C (97.5 °F), temperature source Temporal, resp. rate 20, height 1.803 m (5' 11\"), weight 143 kg (314 lb 6.4 oz), SpO2 92%.    Medications  Scheduled medications  acetaminophen, 650 mg, oral, q6h  ammonium lactate, 1 Application, Topical, BID  enoxaparin, 40 mg, subcutaneous, Daily  escitalopram, 10 mg, oral, Daily  furosemide, 40 mg, intravenous, Daily  multivitamin with minerals, 1 tablet, oral, Daily  simvastatin, 20 mg, oral, Daily  valsartan, 320 mg, oral, " Daily      Continuous medications     PRN medications  PRN medications: famotidine, melatonin, ondansetron, oxygen, sennosides-docusate sodium    Relevant Results  Results for orders placed or performed during the hospital encounter of 10/07/24 (from the past 24 hour(s))   ECG 12 lead   Result Value Ref Range    Ventricular Rate 64 BPM    Atrial Rate 64 BPM    ME Interval 150 ms    QRS Duration 84 ms    QT Interval 434 ms    QTC Calculation(Bazett) 447 ms    P Axis 61 degrees    R Axis 26 degrees    T Axis 66 degrees    QRS Count 10 beats    Q Onset 222 ms    P Onset 147 ms    P Offset 199 ms    T Offset 439 ms    QTC Fredericia 443 ms   Troponin I, High Sensitivity   Result Value Ref Range    Troponin I, High Sensitivity 12 0 - 20 ng/L   Basic Metabolic Panel   Result Value Ref Range    Glucose 121 (H) 74 - 99 mg/dL    Sodium 134 (L) 136 - 145 mmol/L    Potassium 4.0 3.5 - 5.3 mmol/L    Chloride 99 98 - 107 mmol/L    Bicarbonate 28 21 - 32 mmol/L    Anion Gap 11 10 - 20 mmol/L    Urea Nitrogen 14 6 - 23 mg/dL    Creatinine 0.93 0.50 - 1.30 mg/dL    eGFR 90 >60 mL/min/1.73m*2    Calcium 9.1 8.6 - 10.3 mg/dL   Magnesium   Result Value Ref Range    Magnesium 1.70 1.60 - 2.40 mg/dL   ECG 12 Lead   Result Value Ref Range    Ventricular Rate 63 BPM    Atrial Rate 63 BPM    ME Interval 146 ms    QRS Duration 84 ms    QT Interval 386 ms    QTC Calculation(Bazett) 395 ms    P Axis 60 degrees    R Axis 25 degrees    T Axis 68 degrees    QRS Count 10 beats    Q Onset 220 ms    P Onset 147 ms    P Offset 199 ms    T Offset 413 ms    QTC Fredericia 392 ms   Transthoracic Echo (TTE) Complete   Result Value Ref Range    AV pk luis 1.67 m/s    AV mn grad 5.4 mmHg    LVOT diam 2.08 cm    MV E/A ratio 1.04     LA vol index A/L 25.5 ml/m2    Tricuspid annular plane systolic excursion 1.5 cm    LV EF 68 %    RV free wall pk S' 12.00 cm/s    RVSP 8.7 mmHg    Aortic Valve Area by Continuity of VTI 2.71 cm2    Aortic Valve Area by  Continuity of Peak Velocity 2.04 cm2    AV pk grad 11.1 mmHg    LV A4C EF 61.8        Transthoracic Echo (TTE) Complete   Final Result      CT angio chest for pulmonary embolism   Final Result   No central PE however limited assessment for peripheral PE due to   technical factors.        Small infiltrate at the right lung base. Mild background pulmonary   heterogeneity which could reflect mosaic attenuation related to small   airways disease and air trapping. Follow-up to ensure resolution   after appropriate treatment recommended.        Small bilateral pleural calcifications/plaques which may be seen with   remote asbestos exposure.        Probable diffuse fatty infiltration of the liver incidentally noted.        MACRO:   None.        Signed by: Arianne cAosta 10/7/2024 4:12 PM   Dictation workstation:   [id number]      Vascular US lower extremity venous duplex bilateral   Final Result   No sonographic evidence for deep vein thrombosis within the evaluated   veins of the left lower extremity.        MACRO:   None        Signed by: Keny Grove 10/7/2024 2:50 PM   Dictation workstation:   [id number]      XR chest 1 view   Final Result   Possible congestive changes however no overt pulmonary edema. No   definitive acute cardiopulmonary disease.        Signed by: Pradeep Dunn 10/7/2024 1:15 PM   Dictation workstation:   [id number]      Point of Care Ultrasound    (Results Pending)       Transthoracic Echo (TTE) Complete    Result Date: 10/8/2024   [facility], [address] [place] [address]               Tel [phone] and Fax [phone] TRANSTHORACIC ECHOCARDIOGRAM REPORT  Patient Name:      LEISA TED Fairbanks Physician:    [id number] Brittany Lowry MD Study Date:        10/8/2024            Ordering Provider:    [id number] KELLEY MRN/PID:            [id number]             Fellow: Accession#:        [id number]         Nurse: Date of Birth/Age: 1955 / 69 years Sonographer:          Paradise Albankrishan                                                               RDCS Gender:            M                    Additional Staff: Height:            154.94 cm            Admit Date:           10/7/2024 Weight:            142.43 kg            Admission Status:     Inpatient -                                                               Routine BSA / BMI:         2.29 m2 / 59.33      Encounter#:           [id number]                    kg/m2 Blood Pressure:    162/67 mmHg          Department Location:  [facility] Non                                                               Invasive Study Type:    TRANSTHORACIC ECHO (TTE) COMPLETE Diagnosis/ICD: Heart failure, unspecified-I50.9; Acute on chronic combined                systolic (congestive) and diastolic (congestive) heart failure                (CHF)-I50.43 Indication:    Congestive Heart Failure CPT Code:      Echo Complete w Full [id number] Patient History: Pertinent History: LE Edema and Dyspnea. Study Detail: The following Echo studies were performed: 2D, M-Mode, Doppler and               color flow. Technically challenging study due to body habitus,               prominent lung artifact and COVID protocol. Unable to obtain RA,RV               and suprasternal notch view. The patient was awake.  PHYSICIAN INTERPRETATION: Left Ventricle: Left ventricular ejection fraction is normal, by visual estimate at 65-70%. The left ventricular cavity size was not assessed. Spectral Doppler shows a normal pattern of left ventricular diastolic filling. Left Atrium: The left atrium was not well visualized. Probably normal size. Right Ventricle: The right ventricle is normal in size. There is normal right ventricular global systolic function. Right Atrium: The right atrium was not well visualized. Probably normal size.  Aortic Valve: The aortic valve is probably trileaflet. There is minimal aortic valve cusp calcification. The aortic valve dimensionless index is 0.80. There is no evidence of aortic valve regurgitation. The peak instantaneous gradient of the aortic valve is 11.1 mmHg. The mean gradient of the aortic valve is 5.4 mmHg. Mitral Valve: The mitral valve is mildly thickened. There is mild mitral annular calcification. There is trace to mild mitral valve regurgitation. Tricuspid Valve: The tricuspid valve was not well visualized. There is trace tricuspid regurgitation. The right ventricular systolic pressure is unable to be estimated. Pulmonic Valve: The pulmonic valve is not well visualized. There is physiologic pulmonic valve regurgitation. Pericardium: There is no pericardial effusion noted. Aorta: The aortic root is normal. Systemic Veins: The inferior vena cava appears normal in size, with IVC inspiratory collapse greater than 50%. In comparison to the previous echocardiogram(s): There are no prior studies on this patient for comparison purposes.  CONCLUSIONS:  1. Poorly visualized anatomical structures due to suboptimal image quality.  2. Left ventricular ejection fraction is normal, by visual estimate at 65-70%.  3. There is normal right ventricular global systolic function. QUANTITATIVE DATA SUMMARY:  2D MEASUREMENTS:          Normal Ranges: LVEDV Index:     33 ml/m2  LA VOLUME:                    Normal Ranges: LA Vol A4C:        57.5 ml    (22+/-6mL/m2) LA Vol A2C:        54.3 ml LA Vol BP:         58.4 ml LA Vol Index A4C:  25.1 ml/m2 LA Vol Index A2C:  23.7 ml/m2 LA Vol Index BP:   25.5 ml/m2 LA Area A4C:       19.8 cm2 LA Area A2C:       18.4 cm2 LA Major Axis A4C: 5.8 cm LA Major Axis A2C: 5.3 cm LA Vol A4C:        52.7 ml LA Vol A2C:        49.9 ml LA Vol Index BSA:  22.4 ml/m2  AORTA MEASUREMENTS:         Normal Ranges: Ao Sinus, d:        2.90 cm (2.1-3.5cm) Asc Ao, d:          3.30 cm (2.1-3.4cm)  LV  SYSTOLIC FUNCTION BY 2D PLANIMETRY (MOD):                      Normal Ranges: EF-A4C View:    62 % (>=55%) EF-A2C View:    56 % EF-Biplane:     58 % EF-Visual:      68 % LV EF Reported: 68 %  LV DIASTOLIC FUNCTION:             Normal Ranges: MV Peak E:             0.72 m/s    (0.7-1.2 m/s) MV Peak A:             0.69 m/s    (0.42-0.7 m/s) E/A Ratio:             1.04        (1.0-2.2) MV e'                  0.085 m/s   (>8.0) MV lateral e'          0.07 m/s MV medial e'           0.10 m/s MV A Dur:              128.45 msec E/e' Ratio:            8.53        (<8.0)  MITRAL VALVE:          Normal Ranges: MV DT:        192 msec (150-240msec)  AORTIC VALVE:                      Normal Ranges: AoV Vmax:                1.67 m/s  (<=1.7m/s) AoV Peak P.1 mmHg (<20mmHg) AoV Mean P.4 mmHg  (1.7-11.5mmHg) LVOT Max Pramod:            1.00 m/s  (<=1.1m/s) AoV VTI:                 28.41 cm  (18-25cm) LVOT VTI:                22.66 cm LVOT Diameter:           2.08 cm   (1.8-2.4cm) AoV Area, VTI:           2.71 cm2  (2.5-5.5cm2) AoV Area,Vmax:           2.04 cm2  (2.5-4.5cm2) AoV Dimensionless Index: 0.80  RIGHT VENTRICLE: TAPSE: 15.0 mm RV s'  0.12 m/s  TRICUSPID VALVE/RVSP:          Normal Ranges: Peak TR Velocity:     0.43 m/s RV Syst Pressure:     9 mmHg   (< 30mmHg) IVC Diam:             1.80 cm  PULMONIC VALVE:          Normal Ranges: PV Max Pramod:     0.9 m/s  (0.6-0.9m/s) PV Max PG:      3.4 mmHg  AORTA: Asc Ao Diam 3.27 cm  [id number] Brittany Lowry MD Electronically signed on 10/8/2024 at 11:56:09 AM  ** Final **         Assessment/Plan   Acute diastolic CHF  -BNP 49  -CXR showed possible congestive changes  -CTA chest showed Small infiltrate at the right lung base. Mild background pulmonary heterogeneity which could reflect mosaic attenuation related to small airways disease and air trapping  -BLE edema     --US BLE negative for DVT  -I reviewed the Echocardiogram as above  -Strict I & Os  -Daily  weights  -2gm na diet  -1500mL fluid restriction  -Needs improved BP control  -Cont lasix IV    2. Hypertension  -BP elevated  -2gm na diet  -Cont Valsartan  -Start amlodipine 5mg daily  -Will hold on beta blockers at this time due to HR low 60's  -monitor    3. Hyperlipidemia  -Cont statin    4. COVID  -Isolation  -supportive care    DIDI Quintanilla-CNP

## 2024-10-08 NOTE — PROGRESS NOTES
10/08/24 1112   Discharge Planning   Living Arrangements Spouse/significant other;Children   Support Systems Spouse/significant other;Children   Assistance Needed patient is alert and oriented x3, independent in ADL's with the exception of limitation d/t his shoulder (total R reverse shoulder arthroplasty canceled), no AD, no DME, drives   Type of Residence Private residence   Number of Stairs to Enter Residence 0  (ramp)   Number of Stairs Within Residence 26   Do you have animals or pets at home? No   Who is requesting discharge planning? Provider   Home or Post Acute Services None   Expected Discharge Disposition Home   Does the patient need discharge transport arranged? No

## 2024-10-09 VITALS
HEART RATE: 67 BPM | SYSTOLIC BLOOD PRESSURE: 168 MMHG | RESPIRATION RATE: 18 BRPM | OXYGEN SATURATION: 93 % | HEIGHT: 71 IN | WEIGHT: 315 LBS | DIASTOLIC BLOOD PRESSURE: 73 MMHG | TEMPERATURE: 98.2 F | BODY MASS INDEX: 44.1 KG/M2

## 2024-10-09 LAB
ALBUMIN SERPL BCP-MCNC: 3.9 G/DL (ref 3.4–5)
ANION GAP SERPL CALC-SCNC: 11 MMOL/L (ref 10–20)
ATRIAL RATE: 63 BPM
ATRIAL RATE: 64 BPM
BUN SERPL-MCNC: 13 MG/DL (ref 6–23)
CALCIUM SERPL-MCNC: 9.3 MG/DL (ref 8.6–10.3)
CHLORIDE SERPL-SCNC: 99 MMOL/L (ref 98–107)
CO2 SERPL-SCNC: 28 MMOL/L (ref 21–32)
CREAT SERPL-MCNC: 0.97 MG/DL (ref 0.5–1.3)
EGFRCR SERPLBLD CKD-EPI 2021: 86 ML/MIN/1.73M*2
ERYTHROCYTE [DISTWIDTH] IN BLOOD BY AUTOMATED COUNT: 18.1 % (ref 11.5–14.5)
GLUCOSE SERPL-MCNC: 109 MG/DL (ref 74–99)
HCT VFR BLD AUTO: 57.2 % (ref 41–52)
HGB BLD-MCNC: 17.5 G/DL (ref 13.5–17.5)
MAGNESIUM SERPL-MCNC: 1.9 MG/DL (ref 1.6–2.4)
MCH RBC QN AUTO: 23.3 PG (ref 26–34)
MCHC RBC AUTO-ENTMCNC: 30.6 G/DL (ref 32–36)
MCV RBC AUTO: 76 FL (ref 80–100)
NRBC BLD-RTO: 0 /100 WBCS (ref 0–0)
P AXIS: 60 DEGREES
P AXIS: 61 DEGREES
P OFFSET: 199 MS
P OFFSET: 199 MS
P ONSET: 147 MS
P ONSET: 147 MS
PHOSPHATE SERPL-MCNC: 3.4 MG/DL (ref 2.5–4.9)
PLATELET # BLD AUTO: 279 X10*3/UL (ref 150–450)
POTASSIUM SERPL-SCNC: 4.2 MMOL/L (ref 3.5–5.3)
PR INTERVAL: 146 MS
PR INTERVAL: 150 MS
Q ONSET: 220 MS
Q ONSET: 222 MS
QRS COUNT: 10 BEATS
QRS COUNT: 10 BEATS
QRS DURATION: 84 MS
QRS DURATION: 84 MS
QT INTERVAL: 386 MS
QT INTERVAL: 434 MS
QTC CALCULATION(BAZETT): 395 MS
QTC CALCULATION(BAZETT): 447 MS
QTC FREDERICIA: 392 MS
QTC FREDERICIA: 443 MS
R AXIS: 25 DEGREES
R AXIS: 26 DEGREES
RBC # BLD AUTO: 7.5 X10*6/UL (ref 4.5–5.9)
SODIUM SERPL-SCNC: 134 MMOL/L (ref 136–145)
T AXIS: 66 DEGREES
T AXIS: 68 DEGREES
T OFFSET: 413 MS
T OFFSET: 439 MS
VENTRICULAR RATE: 63 BPM
VENTRICULAR RATE: 64 BPM
WBC # BLD AUTO: 5.3 X10*3/UL (ref 4.4–11.3)

## 2024-10-09 PROCEDURE — 80069 RENAL FUNCTION PANEL: CPT | Performed by: FAMILY MEDICINE

## 2024-10-09 PROCEDURE — 2500000002 HC RX 250 W HCPCS SELF ADMINISTERED DRUGS (ALT 637 FOR MEDICARE OP, ALT 636 FOR OP/ED): Performed by: STUDENT IN AN ORGANIZED HEALTH CARE EDUCATION/TRAINING PROGRAM

## 2024-10-09 PROCEDURE — 85027 COMPLETE CBC AUTOMATED: CPT | Performed by: FAMILY MEDICINE

## 2024-10-09 PROCEDURE — 36415 COLL VENOUS BLD VENIPUNCTURE: CPT | Performed by: STUDENT IN AN ORGANIZED HEALTH CARE EDUCATION/TRAINING PROGRAM

## 2024-10-09 PROCEDURE — 99238 HOSP IP/OBS DSCHRG MGMT 30/<: CPT | Performed by: FAMILY MEDICINE

## 2024-10-09 PROCEDURE — 2500000001 HC RX 250 WO HCPCS SELF ADMINISTERED DRUGS (ALT 637 FOR MEDICARE OP): Performed by: NURSE PRACTITIONER

## 2024-10-09 PROCEDURE — 83735 ASSAY OF MAGNESIUM: CPT | Performed by: STUDENT IN AN ORGANIZED HEALTH CARE EDUCATION/TRAINING PROGRAM

## 2024-10-09 PROCEDURE — 2500000004 HC RX 250 GENERAL PHARMACY W/ HCPCS (ALT 636 FOR OP/ED): Performed by: STUDENT IN AN ORGANIZED HEALTH CARE EDUCATION/TRAINING PROGRAM

## 2024-10-09 PROCEDURE — 2500000001 HC RX 250 WO HCPCS SELF ADMINISTERED DRUGS (ALT 637 FOR MEDICARE OP): Performed by: STUDENT IN AN ORGANIZED HEALTH CARE EDUCATION/TRAINING PROGRAM

## 2024-10-09 RX ORDER — FUROSEMIDE 40 MG/1
40 TABLET ORAL DAILY
Qty: 30 TABLET | Refills: 0 | Status: SHIPPED | OUTPATIENT
Start: 2024-10-09

## 2024-10-09 RX ORDER — AMLODIPINE BESYLATE 10 MG/1
10 TABLET ORAL DAILY
Qty: 30 TABLET | Refills: 0 | Status: SHIPPED | OUTPATIENT
Start: 2024-10-10

## 2024-10-09 RX ORDER — AMLODIPINE BESYLATE 10 MG/1
10 TABLET ORAL DAILY
Status: DISCONTINUED | OUTPATIENT
Start: 2024-10-10 | End: 2024-10-09 | Stop reason: HOSPADM

## 2024-10-09 RX ADMIN — AMLODIPINE BESYLATE 5 MG: 5 TABLET ORAL at 09:52

## 2024-10-09 RX ADMIN — ESCITALOPRAM OXALATE 10 MG: 10 TABLET ORAL at 09:52

## 2024-10-09 RX ADMIN — VALSARTAN 320 MG: 160 TABLET, FILM COATED ORAL at 09:52

## 2024-10-09 RX ADMIN — SIMVASTATIN 20 MG: 20 TABLET, FILM COATED ORAL at 09:57

## 2024-10-09 RX ADMIN — FUROSEMIDE 40 MG: 10 INJECTION, SOLUTION INTRAMUSCULAR; INTRAVENOUS at 09:52

## 2024-10-09 RX ADMIN — ACETAMINOPHEN 650 MG: 325 TABLET, FILM COATED ORAL at 05:01

## 2024-10-09 RX ADMIN — ENOXAPARIN SODIUM 40 MG: 40 INJECTION SUBCUTANEOUS at 09:51

## 2024-10-09 RX ADMIN — Medication 1 APPLICATION: at 09:52

## 2024-10-09 RX ADMIN — Medication 1 TABLET: at 09:52

## 2024-10-09 RX ADMIN — ACETAMINOPHEN 650 MG: 325 TABLET, FILM COATED ORAL at 09:57

## 2024-10-09 ASSESSMENT — COGNITIVE AND FUNCTIONAL STATUS - GENERAL
TURNING FROM BACK TO SIDE WHILE IN FLAT BAD: A LITTLE
TOILETING: A LITTLE
DAILY ACTIVITIY SCORE: 20
STANDING UP FROM CHAIR USING ARMS: A LITTLE
HELP NEEDED FOR BATHING: A LITTLE
MOBILITY SCORE: 19
CLIMB 3 TO 5 STEPS WITH RAILING: A LITTLE
MOVING TO AND FROM BED TO CHAIR: A LITTLE
WALKING IN HOSPITAL ROOM: A LITTLE
DRESSING REGULAR LOWER BODY CLOTHING: A LITTLE
DRESSING REGULAR UPPER BODY CLOTHING: A LITTLE

## 2024-10-09 ASSESSMENT — PAIN SCALES - GENERAL: PAINLEVEL_OUTOF10: 0 - NO PAIN

## 2024-10-09 NOTE — PROGRESS NOTES
Gen Solis II is a 69 y.o. male on day 2 of admission presenting with CHF (congestive heart failure), NYHA class I, acute on chronic, combined.      Subjective   He is sitting up in the bed, states he feels better today. Still slightly short of breath with exertion. Leg swelling improved.       Review of systems:  Constitutional: negative for fever, chills, or malaise  Neuro: negative for dizziness, headache, numbness, tingling  ENT: Negative for nasal congestion or sore throat  Resp: negative for cough, or wheezing  CV: negative for chest pain, palpitations  GI: negative for abd pain, nausea, vomiting or diarrhea  : negative for dysuria, frequency, or urgency  Skin: negative for lesions, wounds, or rash  Musculoskeletal: Negative for weakness, myalgia, or arthralgia  Endocrine: Negative for polyuria or polydipsia         Objective   Constitutional: Well developed, awake/alert/oriented x3, no distress, alert and cooperative  Eyes: PERRL, EOMI, clear sclera  ENMT: mucous membranes moist, no apparent injury, no lesions seen  Head/Neck: Neck supple, no apparent injury, thyroid without mass or tenderness, No JVD, trachea midline, no bruits  Respiratory/Thorax: Patent airways, CTAB, normal breath sounds with good chest expansion, thorax symmetric  Cardiovascular: Regular, rate and rhythm, no murmurs, 2+ equal pulses of the extremities, normal S 1and S 2  Gastrointestinal: Nondistended, soft, non-tender, no rebound tenderness or guarding, no masses palpable, no organomegaly, +BS, no bruits  Musculoskeletal: ROM intact, no joint swelling, normal strength  Extremities: tight, tiago appearance  Neurological: alert and oriented x3, intact senses, motor, response and reflexes, normal strength  Lymphatic: No significant lymphadenopathy  Psychological: Appropriate mood and behavior  Skin: Warm and dry, no lesions, no rashes      Last Recorded Vitals  /73 (BP Location: Left arm, Patient Position: Lying)   Pulse 67    "Temp 36.8 °C (98.2 °F) (Temporal)   Resp 18   Ht 1.803 m (5' 11\")   Wt 146 kg (322 lb 8.5 oz)   SpO2 93%   BMI 44.98 kg/m²     Intake/Output last 3 Shifts:  I/O last 3 completed shifts:  In: 840 (5.9 mL/kg) [P.O.:840]  Out: 153 (1.1 mL/kg) [Urine:153 (0 mL/kg/hr)]  Weight: 142.6 kg   I/O this shift:  In: 177 [P.O.:177]  Out: -     Relevant Results  Scheduled medications  acetaminophen, 650 mg, oral, q6h  [START ON 10/10/2024] amLODIPine, 10 mg, oral, Daily  ammonium lactate, 1 Application, Topical, BID  enoxaparin, 40 mg, subcutaneous, Daily  escitalopram, 10 mg, oral, Daily  furosemide, 40 mg, intravenous, Daily  multivitamin with minerals, 1 tablet, oral, Daily  simvastatin, 20 mg, oral, Daily  valsartan, 320 mg, oral, Daily      Continuous medications     PRN medications  PRN medications: famotidine, melatonin, ondansetron, oxygen, sennosides-docusate sodium    Results for orders placed or performed during the hospital encounter of 10/07/24 (from the past 24 hour(s))   Magnesium   Result Value Ref Range    Magnesium 1.90 1.60 - 2.40 mg/dL   Renal Function Panel   Result Value Ref Range    Glucose 109 (H) 74 - 99 mg/dL    Sodium 134 (L) 136 - 145 mmol/L    Potassium 4.2 3.5 - 5.3 mmol/L    Chloride 99 98 - 107 mmol/L    Bicarbonate 28 21 - 32 mmol/L    Anion Gap 11 10 - 20 mmol/L    Urea Nitrogen 13 6 - 23 mg/dL    Creatinine 0.97 0.50 - 1.30 mg/dL    eGFR 86 >60 mL/min/1.73m*2    Calcium 9.3 8.6 - 10.3 mg/dL    Phosphorus 3.4 2.5 - 4.9 mg/dL    Albumin 3.9 3.4 - 5.0 g/dL   CBC   Result Value Ref Range    WBC 5.3 4.4 - 11.3 x10*3/uL    nRBC 0.0 0.0 - 0.0 /100 WBCs    RBC 7.50 (H) 4.50 - 5.90 x10*6/uL    Hemoglobin 17.5 13.5 - 17.5 g/dL    Hematocrit 57.2 (H) 41.0 - 52.0 %    MCV 76 (L) 80 - 100 fL    MCH 23.3 (L) 26.0 - 34.0 pg    MCHC 30.6 (L) 32.0 - 36.0 g/dL    RDW 18.1 (H) 11.5 - 14.5 %    Platelets 279 150 - 450 x10*3/uL       Transthoracic Echo (TTE) Complete   Final Result      CT angio chest for " pulmonary embolism   Final Result   No central PE however limited assessment for peripheral PE due to   technical factors.        Small infiltrate at the right lung base. Mild background pulmonary   heterogeneity which could reflect mosaic attenuation related to small   airways disease and air trapping. Follow-up to ensure resolution   after appropriate treatment recommended.        Small bilateral pleural calcifications/plaques which may be seen with   remote asbestos exposure.        Probable diffuse fatty infiltration of the liver incidentally noted.        MACRO:   None.        Signed by: Arianne Acosta 10/7/2024 4:12 PM   Dictation workstation:   [id number]      Vascular US lower extremity venous duplex bilateral   Final Result   No sonographic evidence for deep vein thrombosis within the evaluated   veins of the left lower extremity.        MACRO:   None        Signed by: Keny Grove 10/7/2024 2:50 PM   Dictation workstation:   [id number]      XR chest 1 view   Final Result   Possible congestive changes however no overt pulmonary edema. No   definitive acute cardiopulmonary disease.        Signed by: Pradeep Dunn 10/7/2024 1:15 PM   Dictation workstation:   [id number]      Point of Care Ultrasound    (Results Pending)       Transthoracic Echo (TTE) Complete    Result Date: 10/8/2024   [facility], [address] [place] [address]               Tel [phone] and Fax [phone] TRANSTHORACIC ECHOCARDIOGRAM REPORT  Patient Name:      LEISA Fairbanks Physician:    [id number] Brittany Lowry MD Study Date:        10/8/2024            Ordering Provider:    [id number] NADEEM KELLEY MRN/PID:           [id number]             Fellow: Accession#:        [id number]         Nurse: Date of Birth/Age: 1955 / 69 years Sonographer:          Paradise López                                                                [facility] Gender:            M                    Additional Staff: Height:            154.94 cm            Admit Date:           10/7/2024 Weight:            142.43 kg            Admission Status:     Inpatient -                                                               Routine BSA / BMI:         2.29 m2 / 59.33      Encounter#:           [id number]                    kg/m2 Blood Pressure:    162/67 mmHg          Department Location:  [facility] Non                                                               Invasive Study Type:    TRANSTHORACIC ECHO (TTE) COMPLETE Diagnosis/ICD: Heart failure, unspecified-I50.9; Acute on chronic combined                systolic (congestive) and diastolic (congestive) heart failure                (CHF)-I50.43 Indication:    Congestive Heart Failure CPT Code:      Echo Complete w Full Doppler-[id number] Patient History: Pertinent History: LE Edema and Dyspnea. Study Detail: The following Echo studies were performed: 2D, M-Mode, Doppler and               color flow. Technically challenging study due to body habitus,               prominent lung artifact and COVID protocol. Unable to obtain RA,RV               and suprasternal notch view. The patient was awake.  PHYSICIAN INTERPRETATION: Left Ventricle: Left ventricular ejection fraction is normal, by visual estimate at 65-70%. The left ventricular cavity size was not assessed. Spectral Doppler shows a normal pattern of left ventricular diastolic filling. Left Atrium: The left atrium was not well visualized. Probably normal size. Right Ventricle: The right ventricle is normal in size. There is normal right ventricular global systolic function. Right Atrium: The right atrium was not well visualized. Probably normal size. Aortic Valve: The aortic valve is probably trileaflet. There is minimal aortic valve cusp calcification. The aortic valve dimensionless index is 0.80. There is no  evidence of aortic valve regurgitation. The peak instantaneous gradient of the aortic valve is 11.1 mmHg. The mean gradient of the aortic valve is 5.4 mmHg. Mitral Valve: The mitral valve is mildly thickened. There is mild mitral annular calcification. There is trace to mild mitral valve regurgitation. Tricuspid Valve: The tricuspid valve was not well visualized. There is trace tricuspid regurgitation. The right ventricular systolic pressure is unable to be estimated. Pulmonic Valve: The pulmonic valve is not well visualized. There is physiologic pulmonic valve regurgitation. Pericardium: There is no pericardial effusion noted. Aorta: The aortic root is normal. Systemic Veins: The inferior vena cava appears normal in size, with IVC inspiratory collapse greater than 50%. In comparison to the previous echocardiogram(s): There are no prior studies on this patient for comparison purposes.  CONCLUSIONS:  1. Poorly visualized anatomical structures due to suboptimal image quality.  2. Left ventricular ejection fraction is normal, by visual estimate at 65-70%.  3. There is normal right ventricular global systolic function. QUANTITATIVE DATA SUMMARY:  2D MEASUREMENTS:          Normal Ranges: LVEDV Index:     33 ml/m2  LA VOLUME:                    Normal Ranges: LA Vol A4C:        57.5 ml    (22+/-6mL/m2) LA Vol A2C:        54.3 ml LA Vol BP:         58.4 ml LA Vol Index A4C:  25.1 ml/m2 LA Vol Index A2C:  23.7 ml/m2 LA Vol Index BP:   25.5 ml/m2 LA Area A4C:       19.8 cm2 LA Area A2C:       18.4 cm2 LA Major Axis A4C: 5.8 cm LA Major Axis A2C: 5.3 cm LA Vol A4C:        52.7 ml LA Vol A2C:        49.9 ml LA Vol Index BSA:  22.4 ml/m2  AORTA MEASUREMENTS:         Normal Ranges: Ao Sinus, d:        2.90 cm (2.1-3.5cm) Asc Ao, d:          3.30 cm (2.1-3.4cm)  LV SYSTOLIC FUNCTION BY 2D PLANIMETRY (MOD):                      Normal Ranges: EF-A4C View:    62 % (>=55%) EF-A2C View:    56 % EF-Biplane:     58 % EF-Visual:      68  % LV EF Reported: 68 %  LV DIASTOLIC FUNCTION:             Normal Ranges: MV Peak E:             0.72 m/s    (0.7-1.2 m/s) MV Peak A:             0.69 m/s    (0.42-0.7 m/s) E/A Ratio:             1.04        (1.0-2.2) MV e'                  0.085 m/s   (>8.0) MV lateral e'          0.07 m/s MV medial e'           0.10 m/s MV A Dur:              128.45 msec E/e' Ratio:            8.53        (<8.0)  MITRAL VALVE:          Normal Ranges: MV DT:        192 msec (150-240msec)  AORTIC VALVE:                      Normal Ranges: AoV Vmax:                1.67 m/s  (<=1.7m/s) AoV Peak P.1 mmHg (<20mmHg) AoV Mean P.4 mmHg  (1.7-11.5mmHg) LVOT Max Pramod:            1.00 m/s  (<=1.1m/s) AoV VTI:                 28.41 cm  (18-25cm) LVOT VTI:                22.66 cm LVOT Diameter:           2.08 cm   (1.8-2.4cm) AoV Area, VTI:           2.71 cm2  (2.5-5.5cm2) AoV Area,Vmax:           2.04 cm2  (2.5-4.5cm2) AoV Dimensionless Index: 0.80  RIGHT VENTRICLE: TAPSE: 15.0 mm RV s'  0.12 m/s  TRICUSPID VALVE/RVSP:          Normal Ranges: Peak TR Velocity:     0.43 m/s RV Syst Pressure:     9 mmHg   (< 30mmHg) IVC Diam:             1.80 cm  PULMONIC VALVE:          Normal Ranges: PV Max Pramod:     0.9 m/s  (0.6-0.9m/s) PV Max PG:      3.4 mmHg  AORTA: Asc Ao Diam 3.27 cm  [id number] Brittany Lowry MD Electronically signed on 10/8/2024 at 11:56:09 AM  ** Final **           Assessment/Plan   Principal Problem:    CHF (congestive heart failure), NYHA class I, acute on chronic, combined  Active Problems:    Essential hypertension    COVID    Acute diastolic CHF  -BNP 49  -CXR showed possible congestive changes  -CTA chest showed Small infiltrate at the right lung base. Mild background pulmonary heterogeneity which could reflect mosaic attenuation related to small airways disease and air trapping  -BLE edema     --US BLE negative for DVT  -I reviewed the Echocardiogram as above  -Strict I & Os  -Daily weights  -2gm na  diet  -1500mL fluid restriction  -Needs improved BP control  -Change lasix to 40mg Po daily  -Outpt stress test      2. Hypertension  -BP elevated  -2gm na diet  -Cont Valsartan and lasix  -Started amlodipine->increase to 10mg daily  -Will hold on beta blockers at this time due to HR low 60's  -monitor     3. Hyperlipidemia  -Cont statin     4. COVID  -Isolation  -supportive care        DIDI Quintanilla-CNP

## 2024-10-09 NOTE — DISCHARGE INSTRUCTIONS
Nothing to eat after midnight the night prior to test. No caffeine 24 hours prior to test. Wear comfortable clothes and tennis shoes.

## 2024-10-09 NOTE — PROGRESS NOTES
10/09/24 0959   Discharge Planning   Living Arrangements Spouse/significant other;Children   Support Systems Spouse/significant other;Children   Assistance Needed A&Ox3, independent in ADL's with the exception of limitation d/t his shoulder (total R reverse shoulder arthroplasty canceled), no AD, no DME, drives   Type of Residence Private residence   Number of Stairs to Enter Residence 0   Number of Stairs Within Residence 26   Do you have animals or pets at home? No   Home or Post Acute Services None   Expected Discharge Disposition Home   Does the patient need discharge transport arranged? No

## 2024-10-09 NOTE — DISCHARGE SUMMARY
Discharge Diagnosis  Acute diastolic heart failure, hypertension, hyperlipidemia, asymptomatic COVID-19 infection    Issues Requiring Follow-Up  Please follow-up with cardiology for stress test and continued care    Discharge Meds     Medication List      START taking these medications     amLODIPine 10 mg tablet; Commonly known as: Norvasc; Take 1 tablet (10   mg) by mouth once daily. Do not fill before October 10, 2024.; Start   taking on: October 10, 2024   furosemide 40 mg tablet; Commonly known as: Lasix; Take 1 tablet (40 mg)   by mouth once daily.     CHANGE how you take these medications     testosterone 20.25 mg/1.25 gram (1.62 %) gel in metered-dose pump; use 2   pumps EVERY DAY AS DIRECTED; What changed: additional instructions     CONTINUE taking these medications     chlorhexidine 0.12 % solution; Commonly known as: Peridex; Use 15 mL in   the mouth or throat if needed (pre operative 15ml swish and spit the night   befor and morning of surgery) for up to 2 doses.   escitalopram 10 mg tablet; Commonly known as: Lexapro; TAKE 1 TABLET   EVERY DAY   multivitamin with minerals iron-free; Commonly known as: Centrum Silver   olmesartan 40 mg tablet; Commonly known as: BENIcar; TAKE 1 TABLET EVERY   DAY   simvastatin 20 mg tablet; Commonly known as: Zocor; TAKE 1 TABLET EVERY   DAY   TylenoL 325 mg tablet; Generic drug: acetaminophen       Test Results Pending At Discharge  Pending Labs       No current pending labs.            Hospital Course   Gen Solis II is a 69 y.o. male with hypertension, class III obesity, hyperlipidemia, JAMIL. He presented with cough, shortness of breath, leg swelling after recent cross-country flight and reported Endorsed worsening leg swelling with weeping edema and weight gain over months.   Venous duplex ruled out lower extremity DVTs.  He tested positive for COVID but was asymptomatic outside of a cough was afebrile and did not require supplemental oxygen.  Cardiology was  consulted with concerns of heart failure.  Echocardiogram revealed an LVEF of 65 to 70%.  Upon discharge cardiology recommended continuing his home medication of olmesartan, starting Norvasc 10 mg daily and Lasix 40 mg daily.      Pertinent Physical Exam At Time of Discharge  Physical Exam  Gen: lying comfortably in bed, not in acute distress  HEENT: atraumatic, normocephalic  Pulm: normal respiratory effort, clear to auscultation b/l  Cardiac: RRR, no murmurs noted, normal S1/S2  GI: Soft, nontender, BS+  MSK: normal ROM without joint swelling  Extremities: Stage I pitting edema bilateral lower extremities  Neuro: AOX3, CN II-XII grossly intact, equal b/l strength, no loss in sensation   Psych: calm and appropriate for situation   Outpatient Follow-Up  Future Appointments   Date Time Provider Department Center   12/27/2024  9:30 AM Pierce Leonard DO [id number]         Андрей Hensley DO

## 2024-10-09 NOTE — NURSING NOTE
Patient given discharge instructions. Patient verbalizes understanding about discharge information. Patient given forms for medication education. Patient IV and tele removed and denies any further needs at this time.

## 2024-10-10 ENCOUNTER — DOCUMENTATION (OUTPATIENT)
Dept: PRIMARY CARE | Facility: CLINIC | Age: 69
End: 2024-10-10
Payer: MEDICARE

## 2024-10-10 ENCOUNTER — PATIENT OUTREACH (OUTPATIENT)
Dept: PRIMARY CARE | Facility: CLINIC | Age: 69
End: 2024-10-10
Payer: MEDICARE

## 2024-10-10 NOTE — PROGRESS NOTES
Discharge Facility: [facility]  Discharge Diagnosis: Acute diastolic heart failure, hypertension, hyperlipidemia, asymptomatic COVID-19 infection   Admission Date: 10/07/2024  Discharge Date: 10/09/2024    PCP Appointment Date: Tasked to office due to availability  Specialist Appointment Date: None  Hospital Encounter and Summary Linked: Yes    See discharge assessment below for further details    Medications  Medications reviewed with patient/caregiver?: Yes (10/10/2024  9:31 AM)  Is the patient having any side effects they believe may be caused by any medication additions or changes?: No (10/10/2024  9:31 AM)  Does the patient have all medications ordered at discharge?: Yes (10/10/2024  9:31 AM)  Prescription Comments: Scripts given at discharge for Norvasc and Lasix (10/10/2024  9:31 AM)  Is the patient taking all medications as directed (includes completed medication regime)?: Yes (10/10/2024  9:31 AM)  Medication Comments: Patient denies any issues obtaining or affording medication (10/10/2024  9:31 AM)    Appointments  Does the patient have a primary care provider?: Yes (10/10/2024  9:31 AM)  Care Management Interventions: -- (Tasked to office due to availability) (10/10/2024  9:31 AM)  Has the patient kept scheduled appointments due by today?: Yes (10/10/2024  9:31 AM)    Self Management  What is the home health agency?: N/A (10/10/2024  9:31 AM)  What Durable Medical Equipment (DME) was ordered?: N/A (10/10/2024  9:31 AM)    Patient Teaching  Does the patient have access to their discharge instructions?: Yes (10/10/2024  9:31 AM)  Care Management Interventions: Reviewed instructions with patient (10/10/2024  9:31 AM)  What is the patient's perception of their health status since discharge?: Improving (10/10/2024  9:31 AM)  Is the patient/caregiver able to teach back the hierarchy of who to call/visit for symptoms/problems? PCP, Specialist, Home Health nurse, Urgent Care, ED, 911: Yes (10/10/2024  9:31  JASPAL)  Patient/Caregiver Education Comments: CM spoke to patient via phone. he states that he is doing well and happy to be home. He has all discharge medication at the home. PCP follow up has been tasked to the office due to availability. he has no questions at this time and was very thankful for this call. (10/10/2024  9:31 AM)

## 2024-10-10 NOTE — SIGNIFICANT EVENT
Follow Up Phone Call    Outgoing phone call    Spoke to: Gen Solis II Relationship:self   Phone number: [phone]      Outcome: contacted patient/ family   Chief Complaint   Patient presents with    Shortness of Breath     Pt c/o SOB, leg swelling. Pt was on a trip last week and had a cough and congestion with body aches.           Diagnosis:Not applicable    States she is feeling better. Reviewed the importance of daily weights. Voiced understanding. No further questions or concerns.

## 2024-10-21 ENCOUNTER — APPOINTMENT (OUTPATIENT)
Dept: PRIMARY CARE | Facility: CLINIC | Age: 69
End: 2024-10-21
Payer: MEDICARE

## 2024-10-21 VITALS
HEIGHT: 71 IN | OXYGEN SATURATION: 92 % | SYSTOLIC BLOOD PRESSURE: 130 MMHG | HEART RATE: 83 BPM | DIASTOLIC BLOOD PRESSURE: 80 MMHG | TEMPERATURE: 97 F | WEIGHT: 315 LBS | BODY MASS INDEX: 44.1 KG/M2

## 2024-10-21 DIAGNOSIS — I50.9 ACUTE CONGESTIVE HEART FAILURE, UNSPECIFIED HEART FAILURE TYPE: ICD-10-CM

## 2024-10-21 DIAGNOSIS — Z09 HOSPITAL DISCHARGE FOLLOW-UP: Primary | ICD-10-CM

## 2024-10-21 PROCEDURE — 1159F MED LIST DOCD IN RCRD: CPT | Performed by: FAMILY MEDICINE

## 2024-10-21 PROCEDURE — 1126F AMNT PAIN NOTED NONE PRSNT: CPT | Performed by: FAMILY MEDICINE

## 2024-10-21 PROCEDURE — 3079F DIAST BP 80-89 MM HG: CPT | Performed by: FAMILY MEDICINE

## 2024-10-21 PROCEDURE — 3075F SYST BP GE 130 - 139MM HG: CPT | Performed by: FAMILY MEDICINE

## 2024-10-21 PROCEDURE — 1111F DSCHRG MED/CURRENT MED MERGE: CPT | Performed by: FAMILY MEDICINE

## 2024-10-21 PROCEDURE — 3008F BODY MASS INDEX DOCD: CPT | Performed by: FAMILY MEDICINE

## 2024-10-21 PROCEDURE — 99213 OFFICE O/P EST LOW 20 MIN: CPT | Performed by: FAMILY MEDICINE

## 2024-10-21 ASSESSMENT — ENCOUNTER SYMPTOMS
DEPRESSION: 0
LOSS OF SENSATION IN FEET: 0
OCCASIONAL FEELINGS OF UNSTEADINESS: 0

## 2024-10-21 ASSESSMENT — LIFESTYLE VARIABLES
SKIP TO QUESTIONS 9-10: 0
HOW OFTEN DO YOU HAVE SIX OR MORE DRINKS ON ONE OCCASION: WEEKLY
AUDIT-C TOTAL SCORE: 5
HOW OFTEN DO YOU HAVE A DRINK CONTAINING ALCOHOL: MONTHLY OR LESS
HOW MANY STANDARD DRINKS CONTAINING ALCOHOL DO YOU HAVE ON A TYPICAL DAY: 3 OR 4

## 2024-10-21 ASSESSMENT — PAIN SCALES - GENERAL: PAINLEVEL_OUTOF10: 0-NO PAIN

## 2024-10-21 ASSESSMENT — PATIENT HEALTH QUESTIONNAIRE - PHQ9
1. LITTLE INTEREST OR PLEASURE IN DOING THINGS: NOT AT ALL
SUM OF ALL RESPONSES TO PHQ9 QUESTIONS 1 AND 2: 0
2. FEELING DOWN, DEPRESSED OR HOPELESS: NOT AT ALL

## 2024-10-21 NOTE — PROGRESS NOTES
Subjective   Patient ID: Gen Solis II is a 69 y.o. male who presents for Follow-up (Hospital f/u).    Past Medical, Surgical, and Family History reviewed and updated in chart.    Reviewed all medications by prescribing practitioner or clinical pharmacist (such as prescriptions, OTCs, herbal therapies and supplements) and documented in the medical record.    HPI  1.  Hospital Follow Up for CHF/COVID  Seen in office on 10/07/2024 with leg swelling/weeping edema after returning from a trip to [place]. He was advised to go to the nearest emergency department for suspicion of acute heart failure. Venous duplex ruled out DVT. He was found to be COVID-19 positive at the ED but asymptomatic outside of a cough, did not require supplement oxygen. Cardiology was consulted, echocardiogram revealed LVEF of 65-70%. Cardiology recommended continuing home medication of Olmesartan, and starting Norvasc 10 mg every day and Lasix 40 mg every day. Lost 20 lbs since last seen in office.     He states that he is doing well since his emergency department visit. Has stress test scheduled with cardiology for 10/23/2024.   Denies dizziness, weakness, confusion, headache, vision/hearing changes, numbness/tingling, cough, shortness of breath, chest pain, palpitations, abdominal pain, postprandial pain, bowel/bladder changes, skin/hair/nail changes, arthralgias, myalgias, rash, calf pain, claudication.   Tolerating medications well, without significant adverse side effects.     Review of Systems  All pertinent positive symptoms are included in the history of present illness.  All other systems have been reviewed and are negative and noncontributory to this patient's current ailments.    Past Medical History:   Diagnosis Date    Anesthesia     anger, agitated , slighly violent    Anxiety     Contusion of lung 10/07/2024    Fracture of multiple ribs 10/07/2024    Fracture of transverse process of lumbar vertebra (Multi) 10/07/2024     "Hyperlipidemia     Hypertension     Low testosterone in male     Obesity     Sleep apnea     does juhi use any device     Past Surgical History:   Procedure Laterality Date    APPENDECTOMY      CATARACT EXTRACTION Bilateral     CHOLECYSTECTOMY      KNEE SURGERY Right     meniscus repair    NASAL SEPTUM SURGERY      VASECTOMY       Social History     Tobacco Use    Smoking status: Former     Current packs/day: 0.00     Types: Cigarettes     Quit date:      Years since quittin.8     Passive exposure: Never    Smokeless tobacco: Never   Vaping Use    Vaping status: Never Used   Substance Use Topics    Alcohol use: Yes     Comment: occ    Drug use: Yes     Types: Marijuana     Comment: on vacation, last use      No family history on file.  Immunization History   Administered Date(s) Administered    DTaP vaccine, pediatric  (INFANRIX) 04/15/2015     Current Outpatient Medications   Medication Instructions    acetaminophen (TylenoL) 325 mg tablet Every 6 hours    amLODIPine (NORVASC) 10 mg, oral, Daily    chlorhexidine (Peridex) 0.12 % solution 15 mL, Mouth/Throat, As needed    escitalopram (LEXAPRO) 10 mg, oral, Daily    furosemide (LASIX) 40 mg, oral, Daily    multivitamin with minerals iron-free (Centrum Silver) 1 tablet, Daily    olmesartan (BENICAR) 40 mg, oral, Daily    simvastatin (ZOCOR) 20 mg, oral, Daily    testosterone 20.25 mg/1.25 gram (1.62 %) gel in metered-dose pump use 2 pumps EVERY DAY AS DIRECTED     Allergies   Allergen Reactions    Bee Venom Protein (Honey Bee) Swelling    Codeine Other    Penicillins Unknown     Makes nerve endings tingle; tolerating amp/sulbactam 2023    Latex Rash and Unknown     Objective   Vitals:    10/21/24 1109   BP: 130/80   Pulse: 83   Temp: 36.1 °C (97 °F)   SpO2: 92%   Weight: 144 kg (316 lb 13.9 oz)   Height: 1.803 m (5' 11\")     Body mass index is 44.19 kg/m².    BP Readings from Last 3 Encounters:   10/21/24 130/80   10/09/24 168/73   10/07/24 (!) " 178/104      Wt Readings from Last 3 Encounters:   10/21/24 144 kg (316 lb 13.9 oz)   10/09/24 146 kg (322 lb 8.5 oz)   10/07/24 (!) 152 kg (336 lb)      Admission on 10/07/2024, Discharged on 10/09/2024   Component Date Value    WBC 10/07/2024 6.2     nRBC 10/07/2024 0.0     RBC 10/07/2024 7.28 (H)     Hemoglobin 10/07/2024 17.1     Hematocrit 10/07/2024 56.3 (H)     MCV 10/07/2024 77 (L)     MCH 10/07/2024 23.5 (L)     MCHC 10/07/2024 30.4 (L)     RDW 10/07/2024 18.0 (H)     Platelets 10/07/2024 281     Neutrophils % 10/07/2024 59.2     Immature Granulocytes %,* 10/07/2024 1.4 (H)     Lymphocytes % 10/07/2024 21.1     Monocytes % 10/07/2024 13.3     Eosinophils % 10/07/2024 3.9     Basophils % 10/07/2024 1.1     Neutrophils Absolute 10/07/2024 3.68     Immature Granulocytes Ab* 10/07/2024 0.09     Lymphocytes Absolute 10/07/2024 1.31     Monocytes Absolute 10/07/2024 0.83     Eosinophils Absolute 10/07/2024 0.24     Basophils Absolute 10/07/2024 0.07     Magnesium 10/07/2024 1.63     Glucose 10/07/2024 112 (H)     Sodium 10/07/2024 134 (L)     Potassium 10/07/2024 4.2     Chloride 10/07/2024 99     Bicarbonate 10/07/2024 30     Anion Gap 10/07/2024 9 (L)     Urea Nitrogen 10/07/2024 13     Creatinine 10/07/2024 0.95     eGFR 10/07/2024 88     Calcium 10/07/2024 9.3     Albumin 10/07/2024 4.0     Alkaline Phosphatase 10/07/2024 40     Total Protein 10/07/2024 7.1     AST 10/07/2024 23     Bilirubin, Total 10/07/2024 0.9     ALT 10/07/2024 26     Troponin I, High Sensiti* 10/07/2024 12     BNP 10/07/2024 49     Coronavirus 2019, PCR 10/07/2024 Detected (A)     Flu A Result 10/07/2024 Not Detected     Flu B Result 10/07/2024 Not Detected     Ventricular Rate 10/07/2024 64     Atrial Rate 10/07/2024 64     WI Interval 10/07/2024 150     QRS Duration 10/07/2024 84     QT Interval 10/07/2024 434     QTC Calculation(Bazett) 10/07/2024 447     P Axis 10/07/2024 61     R [place] 10/07/2024 26     T [place] 10/07/2024 66     QRS  Count 10/07/2024 10     Q Onset 10/07/2024 222     P Onset 10/07/2024 147     P Offset 10/07/2024 199     T Offset 10/07/2024 439     QTC Fredericia 10/07/2024 443     Troponin I, High Sensiti* 10/07/2024 12     AV pk luis 10/08/2024 1.67     AV mn grad 10/08/2024 5.4     LVOT diam 10/08/2024 2.08     MV E/A ratio 10/08/2024 1.04     LA vol index A/L 10/08/2024 25.5     Tricuspid annular plane * 10/08/2024 1.5     LV EF 10/08/2024 68     RV free wall pk S' 10/08/2024 12.00     RVSP 10/08/2024 8.7     Aortic Valve Area by Con* 10/08/2024 2.71     Aortic Valve Area by Con* 10/08/2024 2.04     AV pk grad 10/08/2024 11.1     LV A4C EF 10/08/2024 61.8     Glucose 10/08/2024 121 (H)     Sodium 10/08/2024 134 (L)     Potassium 10/08/2024 4.0     Chloride 10/08/2024 99     Bicarbonate 10/08/2024 28     Anion Gap 10/08/2024 11     Urea Nitrogen 10/08/2024 14     Creatinine 10/08/2024 0.93     eGFR 10/08/2024 90     Calcium 10/08/2024 9.1     Magnesium 10/08/2024 1.70     Ventricular Rate 10/08/2024 63     Atrial Rate 10/08/2024 63     DE Interval 10/08/2024 146     QRS Duration 10/08/2024 84     QT Interval 10/08/2024 386     QTC Calculation(Bazett) 10/08/2024 395     P Axis 10/08/2024 60     R [place] 10/08/2024 25     T [place] 10/08/2024 68     QRS Count 10/08/2024 10     Q Onset 10/08/2024 220     P Onset 10/08/2024 147     P Offset 10/08/2024 199     T Offset 10/08/2024 413     QTC Fredericia 10/08/2024 392     Magnesium 10/09/2024 1.90     Glucose 10/09/2024 109 (H)     Sodium 10/09/2024 134 (L)     Potassium 10/09/2024 4.2     Chloride 10/09/2024 99     Bicarbonate 10/09/2024 28     Anion Gap 10/09/2024 11     Urea Nitrogen 10/09/2024 13     Creatinine 10/09/2024 0.97     eGFR 10/09/2024 86     Calcium 10/09/2024 9.3     Phosphorus 10/09/2024 3.4     Albumin 10/09/2024 3.9     WBC 10/09/2024 5.3     nRBC 10/09/2024 0.0     RBC 10/09/2024 7.50 (H)     Hemoglobin 10/09/2024 17.5     Hematocrit 10/09/2024 57.2 (H)     MCV  10/09/2024 76 (L)     MCH 10/09/2024 23.3 (L)     MCHC 10/09/2024 30.6 (L)     RDW 10/09/2024 18.1 (H)     Platelets 10/09/2024 279    Pre-Admission Testing on 10/03/2024   Component Date Value    WBC 10/03/2024 7.3     nRBC 10/03/2024 0.0     RBC 10/03/2024 6.79 (H)     Hemoglobin 10/03/2024 16.2     Hematocrit 10/03/2024 53.2 (H)     MCV 10/03/2024 78 (L)     MCH 10/03/2024 23.9 (L)     MCHC 10/03/2024 30.5 (L)     RDW 10/03/2024 17.9 (H)     Platelets 10/03/2024 286     Neutrophils % 10/03/2024 72.7     Immature Granulocytes %,* 10/03/2024 0.3     Lymphocytes % 10/03/2024 12.8     Monocytes % 10/03/2024 10.7     Eosinophils % 10/03/2024 2.7     Basophils % 10/03/2024 0.8     Neutrophils Absolute 10/03/2024 5.32     Immature Granulocytes Ab* 10/03/2024 0.02     Lymphocytes Absolute 10/03/2024 0.94 (L)     Monocytes Absolute 10/03/2024 0.78     Eosinophils Absolute 10/03/2024 0.20     Basophils Absolute 10/03/2024 0.06     Glucose 10/03/2024 131 (H)     Sodium 10/03/2024 136     Potassium 10/03/2024 5.1     Chloride 10/03/2024 102     Bicarbonate 10/03/2024 30     Anion Gap 10/03/2024 9 (L)     Urea Nitrogen 10/03/2024 12     Creatinine 10/03/2024 1.16     eGFR 10/03/2024 69     Calcium 10/03/2024 9.2     Albumin 10/03/2024 3.9     Alkaline Phosphatase 10/03/2024 35     Total Protein 10/03/2024 6.4     AST 10/03/2024 27     Bilirubin, Total 10/03/2024 1.0     ALT 10/03/2024 25     Hemoglobin A1C 10/03/2024 7.2 (H)     Estimated Average Glucose 10/03/2024 160      Physical Exam  CONSTITUTIONAL - well nourished, well developed, looks like stated age, in no acute distress, not ill-appearing, and not tired appearing  SKIN - age and sun related skin changes   HEAD - no trauma, normocephalic  EYES - EOMI, anicteric sclera  NECK - supple without rigidity, no neck mass was observed, no thyromegaly or thyroid nodules  CHEST - clear to auscultation, no wheezing, no crackles and no rales, good effort  CARDIAC - regular rate  and regular rhythm, no skipped beats, no murmur  ABDOMEN - no organomegaly, soft, nontender, nondistended, normal bowel sounds, no guarding/rebound/rigidity  EXTREMITIES - bilateral LE edema, no obvious or evident deformities  NEUROLOGICAL - alert, oriented and no focal signs  PSYCHIATRIC - alert, pleasant and cordial, age-appropriate    Assessment/Plan   Hospital Follow Up CHF/COVID  Stable, no changes to medical management recommended at this time   Continue Olmesartan, Lasix, Amlodipine   Continue to follow up with Cardiologist    Glad to see that you are doing better today, make sure that you continue to take your medications, eat healthy, and exercise as tolerated.   Contact the office with any questions or concerns.

## 2024-10-22 ENCOUNTER — PATIENT OUTREACH (OUTPATIENT)
Dept: PRIMARY CARE | Facility: CLINIC | Age: 69
End: 2024-10-22
Payer: MEDICARE

## 2024-10-22 NOTE — PROGRESS NOTES
Call regarding appt. with PCP on 10/21/2024 after hospitalization.  At time of outreach call the patient feels as if their condition has improved since last visit.  Reviewed the PCP appointment with the pt and addressed any questions or concerns.

## 2024-10-24 NOTE — PROGRESS NOTES
I reviewed and examined the patient. I was present for the key exam elements, and I fully participated in the patient's care. I discussed the management of the care with the resident. I have personally reviewed the pertinent labs and imaging, as well as recent notes, with the patient. I have reviewed the note above and agree with the resident's medical decision making as documented in the resident's note, in addition to the following comments / findings:     Agree with the rest of the plan outlined below by resident physician. No red flags.      The patient understands and agrees to the assessment and plan of care. Patient has also agreed to follow up and comply with the treatment and evaluation as recommended today. Patient was instructed to call the office at [phone] should questions arise regarding their treatment or care.     Pierce Leonard DO, FAOASM  Family Medicine   [address], [address]  [place] [address]     Pierce Leonard DO

## 2024-11-05 DIAGNOSIS — I50.43 CHF (CONGESTIVE HEART FAILURE), NYHA CLASS I, ACUTE ON CHRONIC, COMBINED: ICD-10-CM

## 2024-11-06 RX ORDER — FUROSEMIDE 40 MG/1
40 TABLET ORAL DAILY
Qty: 30 TABLET | Refills: 0 | Status: SHIPPED | OUTPATIENT
Start: 2024-11-06

## 2024-11-08 ENCOUNTER — TELEPHONE (OUTPATIENT)
Dept: PRIMARY CARE | Facility: CLINIC | Age: 69
End: 2024-11-08
Payer: MEDICARE

## 2024-11-13 ENCOUNTER — APPOINTMENT (OUTPATIENT)
Dept: PRIMARY CARE | Facility: CLINIC | Age: 69
End: 2024-11-13
Payer: MEDICARE

## 2024-11-13 VITALS
WEIGHT: 315 LBS | HEIGHT: 70 IN | OXYGEN SATURATION: 94 % | SYSTOLIC BLOOD PRESSURE: 146 MMHG | HEART RATE: 82 BPM | DIASTOLIC BLOOD PRESSURE: 80 MMHG | BODY MASS INDEX: 45.1 KG/M2

## 2024-11-13 DIAGNOSIS — M79.672 FOOT PAIN, LEFT: Primary | ICD-10-CM

## 2024-11-13 DIAGNOSIS — E11.9 TYPE 2 DIABETES MELLITUS WITHOUT COMPLICATION, WITHOUT LONG-TERM CURRENT USE OF INSULIN (MULTI): ICD-10-CM

## 2024-11-13 PROCEDURE — 3008F BODY MASS INDEX DOCD: CPT | Performed by: FAMILY MEDICINE

## 2024-11-13 PROCEDURE — 3051F HG A1C>EQUAL 7.0%<8.0%: CPT | Performed by: FAMILY MEDICINE

## 2024-11-13 PROCEDURE — 4010F ACE/ARB THERAPY RXD/TAKEN: CPT | Performed by: FAMILY MEDICINE

## 2024-11-13 PROCEDURE — 1159F MED LIST DOCD IN RCRD: CPT | Performed by: FAMILY MEDICINE

## 2024-11-13 PROCEDURE — 99214 OFFICE O/P EST MOD 30 MIN: CPT | Performed by: FAMILY MEDICINE

## 2024-11-13 PROCEDURE — 3077F SYST BP >= 140 MM HG: CPT | Performed by: FAMILY MEDICINE

## 2024-11-13 PROCEDURE — 3079F DIAST BP 80-89 MM HG: CPT | Performed by: FAMILY MEDICINE

## 2024-11-13 PROCEDURE — G2211 COMPLEX E/M VISIT ADD ON: HCPCS | Performed by: FAMILY MEDICINE

## 2024-11-13 RX ORDER — TIRZEPATIDE 2.5 MG/.5ML
2.5 INJECTION, SOLUTION SUBCUTANEOUS WEEKLY
Qty: 4 PEN | Refills: 0 | Status: SHIPPED | OUTPATIENT
Start: 2024-11-13 | End: 2024-12-11

## 2024-11-13 RX ORDER — TIRZEPATIDE 5 MG/.5ML
5 INJECTION, SOLUTION SUBCUTANEOUS WEEKLY
Qty: 2 ML | Refills: 1 | Status: SHIPPED | OUTPATIENT
Start: 2024-11-13 | End: 2025-01-12

## 2024-11-13 ASSESSMENT — PATIENT HEALTH QUESTIONNAIRE - PHQ9
2. FEELING DOWN, DEPRESSED OR HOPELESS: NOT AT ALL
SUM OF ALL RESPONSES TO PHQ9 QUESTIONS 1 AND 2: 0
1. LITTLE INTEREST OR PLEASURE IN DOING THINGS: NOT AT ALL

## 2024-11-13 NOTE — PROGRESS NOTES
"Subjective   Patient ID: Gen Solis II is a 69 y.o. male who presents for Follow-up.    HPI   He is 69 years old man who came today to the office after he saw his cardiologist who referred him to PCP for weight loss medication.   Cardiology doctor reassured him regarding his heart.     Foot pain   Patient had bump to the chair and hit his left toe. This happened a week ago but the last 2 days has been feeling ache.     Review of Systems  Patient denied cough, chest pain, sob, fever, low leg swelling, abdominal pain, nausea, vomiting.   Objective   /80   Pulse 82   Ht 1.778 m (5' 10\")   Wt 145 kg (320 lb)   SpO2 94%   BMI 45.92 kg/m²     Physical Exam  General: Alert and oriented. Appears well-nourished and in no acute distress.  Eyes: PERRLA. EOMI.  Head/neck: Normocephalic. Supple.  Lymphatics: No cervical lymphadenopathy.  Respiratory/Thorax: Clear to auscultation bilaterally. No wheezing.   Cardiovascular: Regular rate and rhythm. No murmurs.  Gastrointestinal: Soft, nontender, nondistended. +BS   Musculoskeletal: ROM intact. Positive for  joint swelling. Tender to tough, no discharge present. Monofilament test was normal.   Extremities: Warm and well perfused. No peripheral edema.  Neurological: No gross neurologic deficits.   Psychological: Appropriate mood and affect.   Skin: Swelling of the distal metatarsophalangeal joint without discharge or vesicles.    Assessment/Plan   He is 69 year old man with PMH of HTN, CHF, obesity who came today to the office concerning for weight and foot pain    Foot pain, left  Differential diagnoses  Gout, charcot joint, fracture  -     XR foot left 3+ views; Future    Type 2 diabetes mellitus without complication, without long-term current use of insulin (Multi)  Review his labs was showing that his A1c a month ago was 7.2.  Advice to watch the diet( reduce salty food and junk food) and Increase the exercise time, as we talk start with walking at least 5 " days/week  -side effect was discussed with patient.   -     tirzepatide (Mounjaro) 2.5 mg/0.5 mL pen injector; Inject 2.5 mg under the skin 1 (one) time per week for 28 days.( To see if he can tolerate)  -     tirzepatide (Mounjaro) 5 mg/0.5 mL pen injector; Inject 5 mg under the skin 1 (one) time per week.    Follow-up in 6 months for chronic management or sooner if needed     I discussed my plan with my attending, Dr Horacio Devries. MD  Family medicine resident  PGY3

## 2024-11-14 NOTE — PROGRESS NOTES
I reviewed and examined the patient. I was present for the key exam elements, and I fully participated in the patient's care. I discussed the management of the care with the resident. I have personally reviewed the pertinent labs and imaging, as well as recent notes, with the patient. I have reviewed the note above and agree with the resident's medical decision making as documented in the resident's note, in addition to the following comments / findings:     Agree with the rest of the plan outlined below by resident physician. No red flags.      The patient understands and agrees to the assessment and plan of care. Patient has also agreed to follow up and comply with the treatment and evaluation as recommended today. Patient was instructed to call the office at [phone] should questions arise regarding their treatment or care.     Pierce Leonard DO, FAOASM  Family Medicine   [address], [address]  [place] [address]     Pierce Leonard DO

## 2024-11-15 ENCOUNTER — PATIENT OUTREACH (OUTPATIENT)
Dept: PRIMARY CARE | Facility: CLINIC | Age: 69
End: 2024-11-15
Payer: MEDICARE

## 2024-12-09 DIAGNOSIS — E11.9 TYPE 2 DIABETES MELLITUS WITHOUT COMPLICATION, WITHOUT LONG-TERM CURRENT USE OF INSULIN (MULTI): ICD-10-CM

## 2024-12-09 DIAGNOSIS — I50.43 CHF (CONGESTIVE HEART FAILURE), NYHA CLASS I, ACUTE ON CHRONIC, COMBINED: ICD-10-CM

## 2024-12-09 RX ORDER — TIRZEPATIDE 5 MG/.5ML
5 INJECTION, SOLUTION SUBCUTANEOUS WEEKLY
Qty: 2 ML | Refills: 1 | Status: SHIPPED | OUTPATIENT
Start: 2024-12-09 | End: 2025-02-07

## 2024-12-09 RX ORDER — FUROSEMIDE 40 MG/1
40 TABLET ORAL DAILY
Qty: 30 TABLET | Refills: 0 | Status: SHIPPED | OUTPATIENT
Start: 2024-12-09

## 2024-12-09 NOTE — TELEPHONE ENCOUNTER
Patient was on the low dose for the first 4 weeks at the beginning to see if he can tolerate. After these 4 weeks he does not need to stay on the lower dose.

## 2024-12-10 RX ORDER — TIRZEPATIDE 2.5 MG/.5ML
2.5 INJECTION, SOLUTION SUBCUTANEOUS WEEKLY
Qty: 2 ML | Refills: 0 | OUTPATIENT
Start: 2024-12-10 | End: 2025-01-07

## 2024-12-10 RX ORDER — TIRZEPATIDE 5 MG/.5ML
5 INJECTION, SOLUTION SUBCUTANEOUS WEEKLY
Qty: 2 ML | Refills: 1 | OUTPATIENT
Start: 2024-12-10 | End: 2025-02-08

## 2024-12-10 RX ORDER — FUROSEMIDE 40 MG/1
40 TABLET ORAL DAILY
Qty: 30 TABLET | Refills: 0 | OUTPATIENT
Start: 2024-12-10

## 2024-12-23 ENCOUNTER — PATIENT OUTREACH (OUTPATIENT)
Dept: PRIMARY CARE | Facility: CLINIC | Age: 69
End: 2024-12-23
Payer: MEDICARE

## 2024-12-27 ENCOUNTER — APPOINTMENT (OUTPATIENT)
Dept: PRIMARY CARE | Facility: CLINIC | Age: 69
End: 2024-12-27
Payer: MEDICARE

## 2025-01-07 DIAGNOSIS — I50.43 CHF (CONGESTIVE HEART FAILURE), NYHA CLASS I, ACUTE ON CHRONIC, COMBINED: ICD-10-CM

## 2025-01-07 RX ORDER — FUROSEMIDE 40 MG/1
40 TABLET ORAL DAILY
Qty: 90 TABLET | Refills: 0 | Status: SHIPPED | OUTPATIENT
Start: 2025-01-07 | End: 2025-04-07

## 2025-01-08 DIAGNOSIS — I10 HYPERTENSION, UNSPECIFIED TYPE: ICD-10-CM

## 2025-01-08 DIAGNOSIS — F32.A DEPRESSION, UNSPECIFIED DEPRESSION TYPE: ICD-10-CM

## 2025-01-08 RX ORDER — ESCITALOPRAM OXALATE 10 MG/1
10 TABLET ORAL DAILY
Qty: 90 TABLET | Refills: 0 | Status: SHIPPED | OUTPATIENT
Start: 2025-01-08

## 2025-01-08 RX ORDER — OLMESARTAN MEDOXOMIL 40 MG/1
40 TABLET ORAL DAILY
Qty: 90 TABLET | Refills: 0 | Status: SHIPPED | OUTPATIENT
Start: 2025-01-08

## 2025-01-25 DIAGNOSIS — E11.9 TYPE 2 DIABETES MELLITUS WITHOUT COMPLICATION, WITHOUT LONG-TERM CURRENT USE OF INSULIN (MULTI): ICD-10-CM

## 2025-01-27 RX ORDER — TIRZEPATIDE 5 MG/.5ML
INJECTION, SOLUTION SUBCUTANEOUS
Qty: 2 ML | Refills: 1 | Status: SHIPPED | OUTPATIENT
Start: 2025-01-27

## 2025-03-17 ENCOUNTER — APPOINTMENT (OUTPATIENT)
Dept: PRIMARY CARE | Facility: CLINIC | Age: 70
End: 2025-03-17
Payer: MEDICARE

## 2025-03-17 VITALS
DIASTOLIC BLOOD PRESSURE: 80 MMHG | OXYGEN SATURATION: 95 % | HEART RATE: 92 BPM | BODY MASS INDEX: 42.8 KG/M2 | WEIGHT: 299 LBS | HEIGHT: 70 IN | SYSTOLIC BLOOD PRESSURE: 130 MMHG

## 2025-03-17 DIAGNOSIS — F41.9 ANXIETY: ICD-10-CM

## 2025-03-17 DIAGNOSIS — I10 ESSENTIAL HYPERTENSION: ICD-10-CM

## 2025-03-17 DIAGNOSIS — Z00.00 ANNUAL PHYSICAL EXAM: ICD-10-CM

## 2025-03-17 DIAGNOSIS — Z00.00 MEDICARE ANNUAL WELLNESS VISIT, SUBSEQUENT: Primary | ICD-10-CM

## 2025-03-17 DIAGNOSIS — I50.43 CHF (CONGESTIVE HEART FAILURE), NYHA CLASS I, ACUTE ON CHRONIC, COMBINED: ICD-10-CM

## 2025-03-17 DIAGNOSIS — Z12.5 PROSTATE CANCER SCREENING: ICD-10-CM

## 2025-03-17 DIAGNOSIS — F32.A DEPRESSION, UNSPECIFIED DEPRESSION TYPE: ICD-10-CM

## 2025-03-17 DIAGNOSIS — E78.5 HYPERLIPIDEMIA, UNSPECIFIED HYPERLIPIDEMIA TYPE: ICD-10-CM

## 2025-03-17 DIAGNOSIS — E11.9 TYPE 2 DIABETES MELLITUS WITHOUT COMPLICATION, WITHOUT LONG-TERM CURRENT USE OF INSULIN (MULTI): ICD-10-CM

## 2025-03-17 DIAGNOSIS — R79.89 LOW TESTOSTERONE IN MALE: ICD-10-CM

## 2025-03-17 DIAGNOSIS — E78.2 MIXED HYPERLIPIDEMIA: ICD-10-CM

## 2025-03-17 DIAGNOSIS — I10 HYPERTENSION, UNSPECIFIED TYPE: ICD-10-CM

## 2025-03-17 PROCEDURE — 99214 OFFICE O/P EST MOD 30 MIN: CPT

## 2025-03-17 PROCEDURE — 1170F FXNL STATUS ASSESSED: CPT

## 2025-03-17 PROCEDURE — 4010F ACE/ARB THERAPY RXD/TAKEN: CPT

## 2025-03-17 PROCEDURE — G0439 PPPS, SUBSEQ VISIT: HCPCS

## 2025-03-17 PROCEDURE — 3008F BODY MASS INDEX DOCD: CPT

## 2025-03-17 PROCEDURE — 1036F TOBACCO NON-USER: CPT

## 2025-03-17 PROCEDURE — 99397 PER PM REEVAL EST PAT 65+ YR: CPT

## 2025-03-17 PROCEDURE — 3079F DIAST BP 80-89 MM HG: CPT

## 2025-03-17 PROCEDURE — 1159F MED LIST DOCD IN RCRD: CPT

## 2025-03-17 PROCEDURE — 3075F SYST BP GE 130 - 139MM HG: CPT

## 2025-03-17 RX ORDER — SIMVASTATIN 20 MG/1
20 TABLET, FILM COATED ORAL DAILY
Qty: 90 TABLET | Refills: 3 | Status: SHIPPED | OUTPATIENT
Start: 2025-03-17

## 2025-03-17 RX ORDER — ESCITALOPRAM OXALATE 10 MG/1
10 TABLET ORAL DAILY
Qty: 90 TABLET | Refills: 3 | Status: SHIPPED | OUTPATIENT
Start: 2025-03-17

## 2025-03-17 RX ORDER — OLMESARTAN MEDOXOMIL 40 MG/1
40 TABLET ORAL DAILY
Qty: 90 TABLET | Refills: 3 | Status: SHIPPED | OUTPATIENT
Start: 2025-03-17

## 2025-03-17 RX ORDER — FUROSEMIDE 40 MG/1
40 TABLET ORAL DAILY
Qty: 90 TABLET | Refills: 3 | Status: SHIPPED | OUTPATIENT
Start: 2025-03-17 | End: 2026-03-12

## 2025-03-17 ASSESSMENT — ENCOUNTER SYMPTOMS
LOSS OF SENSATION IN FEET: 0
DEPRESSION: 0
OCCASIONAL FEELINGS OF UNSTEADINESS: 0

## 2025-03-17 ASSESSMENT — ACTIVITIES OF DAILY LIVING (ADL)
GROCERY_SHOPPING: INDEPENDENT
DOING_HOUSEWORK: INDEPENDENT
BATHING: INDEPENDENT
TAKING_MEDICATION: INDEPENDENT
MANAGING_FINANCES: INDEPENDENT
DRESSING: INDEPENDENT

## 2025-03-17 NOTE — ASSESSMENT & PLAN NOTE
Currently on 5 mg Mounjaro weekly, tolerating well with no side effects  Has lost 20 pounds in the last 4 months  Patient has a couple weeks of current dosage left at home   A1C ordered as part of routine bloodwork, will titrate dosage of Mounjaro accordingly after A1C results finalize   Of note, Mounjaro rx has to be sent to [facility]

## 2025-03-17 NOTE — ASSESSMENT & PLAN NOTE
Hypertension  /80  in office today  Stable  Continue current medication regimen   Refills sent to your preferred pharmacy

## 2025-03-17 NOTE — PROGRESS NOTES
Chief Complaint  Reason for Visit: Gen Solis II is an 70 y.o. male here for a Annual Exam and follow up of chronic conditions        Reviewed all medications by prescribing practitioner or clinical pharmacist (such as prescriptions, OTCs, herbal therapies and supplements) and documented in the medical record.    History of Present Illness  1.  Medicare wellness/physical exam.  Colonoscopy: last done 12/4/19 with 5 year clearance, is overdue   Immunizations: PCV, Zoster, RSV to be given at pharmacy   Is due for yearly labs  Smoking: non-smoker, quit 40 years ago    2. Hyperlipidemia  Currently taking 20 mg simvastatin daily as prescribed   Tolerating well with no noticeable side effects   Last lipid panel was well controlled with today's ASCVD risk of 34.8%  CT cardiac score never done  Is requesting refills today      3. Hypertension, CHF, NYHA class 1    Currently taking 40 mg olmesartan and 40 mg Lasix daily as prescribed   Tolerating well without side effects    Denies cardiopulmonary symptoms    Is requesting refills today     4. Diabetes mellitus type 2   Currently taking 5 mg Mounjaro weekly  Tolerating medications well without side effects    Last A1c 7.2 on 10/3/24  Does regularly see ophthalmology, does not see podiatry  On a statin   Denies polyphagia, neuropathy, loss of sensation, or vision problems   Does urinate often but is on Lasix daily    5. Anxiety  On lexapro 10 mg daily   Doing well on this current dosage, no SI/HI  Is requesting refills     6. BL shoulder pain, R > L   Previously established osteoarthritis of bilateral shoulders  Has gone through PT, plan was to have reverse total shoulder arthroplasty done but surgery was canceled during pre-op evaluation this past fall   Impacting day to day, difficult to carry anything   Will reach out to orthopedist he has previously established with      Review of Systems  All pertinent positive symptoms are included in the history of present  illness.    All other systems have been reviewed and are negative and noncontributory to this patient's current ailments.    Past Medical History  He has a past medical history of Anesthesia, Anxiety, Contusion of lung (10/07/2024), Fracture of multiple ribs (10/07/2024), Fracture of transverse process of lumbar vertebra (Multi) (10/07/2024), Hyperlipidemia, Hypertension, Low testosterone in male, Obesity, Sleep apnea, and Type 2 diabetes mellitus without complication, without long-term current use of insulin (Multi) (3/17/2025).    He has no past medical history of Asthma, Awareness under anesthesia, COPD (chronic obstructive pulmonary disease) (Multi), Delayed emergence from general anesthesia, Diabetes mellitus type I (Multi), GERD (gastroesophageal reflux disease), Hard to intubate, Malignant hyperthermia, or PONV (postoperative nausea and vomiting).    Surgical History  He has a past surgical history that includes Cataract extraction (Bilateral); Appendectomy; Cholecystectomy; Nasal septum surgery; Vasectomy; and Knee surgery (Right).     Social History  He reports that he quit smoking about 28 years ago. His smoking use included cigarettes. He has never been exposed to tobacco smoke. He has never used smokeless tobacco. He reports current alcohol use. He reports current drug use. Drug: Marijuana.    No family history on file.  Outpatient Medications Prior to Visit   Medication Sig Dispense Refill    chlorhexidine (Peridex) 0.12 % solution Use 15 mL in the mouth or throat if needed (pre operative 15ml swish and spit the night befor and morning of surgery) for up to 2 doses. 30 mL 0    Mounjaro 5 mg/0.5 mL pen injector INJECT 5 (FIVE) mg SUBCUTANEOUSLY (UNDER THE SKIN) ONCE A WEEK 2 mL 1    multivitamin with minerals iron-free (Centrum Silver) Take 1 tablet by mouth once daily.      testosterone 20.25 mg/1.25 gram (1.62 %) gel in metered-dose pump use 2 pumps EVERY DAY AS DIRECTED 75 g 0    escitalopram  "(Lexapro) 10 mg tablet TAKE 1 TABLET EVERY DAY 90 tablet 0    furosemide (Lasix) 40 mg tablet Take 1 tablet (40 mg) by mouth once daily. 90 tablet 0    olmesartan (BENIcar) 40 mg tablet TAKE 1 TABLET EVERY DAY 90 tablet 0    simvastatin (Zocor) 20 mg tablet TAKE 1 TABLET EVERY DAY 90 tablet 3    acetaminophen (TylenoL) 325 mg tablet Take by mouth every 6 hours.      amLODIPine (Norvasc) 10 mg tablet Take 1 tablet (10 mg) by mouth once daily. Do not fill before October 10, 2024. (Patient not taking: Reported on 3/17/2025) 30 tablet 0     No facility-administered medications prior to visit.     Allergies  Bee venom protein (honey bee), Codeine, Penicillins, and Latex    Immunization History   Administered Date(s) Administered    DTaP vaccine, pediatric  (INFANRIX) 04/15/2015     Objective   Visit Vitals  /80   Pulse 92   Ht 1.778 m (5' 10\")   Wt 136 kg (299 lb)   SpO2 95%   BMI 42.90 kg/m²   Smoking Status Former   BSA 2.59 m²        BP Readings from Last 3 Encounters:   03/17/25 130/80   11/13/24 146/80   10/21/24 130/80      Wt Readings from Last 3 Encounters:   03/17/25 136 kg (299 lb)   11/13/24 145 kg (320 lb)   10/21/24 144 kg (316 lb 13.9 oz)      Vision  No results found.    Patient Self Assessment of Health Status  Patient Self Assessment: Good    Nutrition and Exercise  Current Diet: Well Balanced Diet  Adequate Fluid Intake: Yes  Caffeine: Yes  Exercise Frequency: Infrequently    Functional Ability/Level of Safety  Cognitive Impairment Observed: No cognitive impairment observed  Cognitive Impairment Reported: No cognitive impairment reported by patient or family    Home Safety Risk Factors: None    Relevant Results  No visits with results within 1 Month(s) from this visit.   Latest known visit with results is:   Admission on 10/07/2024, Discharged on 10/09/2024   Component Date Value    WBC 10/07/2024 6.2     nRBC 10/07/2024 0.0     RBC 10/07/2024 7.28 (H)     Hemoglobin 10/07/2024 17.1     Hematocrit " 10/07/2024 56.3 (H)     MCV 10/07/2024 77 (L)     MCH 10/07/2024 23.5 (L)     MCHC 10/07/2024 30.4 (L)     RDW 10/07/2024 18.0 (H)     Platelets 10/07/2024 281     Neutrophils % 10/07/2024 59.2     Immature Granulocytes %,* 10/07/2024 1.4 (H)     Lymphocytes % 10/07/2024 21.1     Monocytes % 10/07/2024 13.3     Eosinophils % 10/07/2024 3.9     Basophils % 10/07/2024 1.1     Neutrophils Absolute 10/07/2024 3.68     Immature Granulocytes Ab* 10/07/2024 0.09     Lymphocytes Absolute 10/07/2024 1.31     Monocytes Absolute 10/07/2024 0.83     Eosinophils Absolute 10/07/2024 0.24     Basophils Absolute 10/07/2024 0.07     Magnesium 10/07/2024 1.63     Glucose 10/07/2024 112 (H)     Sodium 10/07/2024 134 (L)     Potassium 10/07/2024 4.2     Chloride 10/07/2024 99     Bicarbonate 10/07/2024 30     Anion Gap 10/07/2024 9 (L)     Urea Nitrogen 10/07/2024 13     Creatinine 10/07/2024 0.95     eGFR 10/07/2024 88     Calcium 10/07/2024 9.3     Albumin 10/07/2024 4.0     Alkaline Phosphatase 10/07/2024 40     Total Protein 10/07/2024 7.1     AST 10/07/2024 23     Bilirubin, Total 10/07/2024 0.9     ALT 10/07/2024 26     Troponin I, High Sensiti* 10/07/2024 12     BNP 10/07/2024 49     Coronavirus 2019, PCR 10/07/2024 Detected (A)     Flu A Result 10/07/2024 Not Detected     Flu B Result 10/07/2024 Not Detected     Ventricular Rate 10/07/2024 64     Atrial Rate 10/07/2024 64     [place] Interval 10/07/2024 150     QRS Duration 10/07/2024 84     QT Interval 10/07/2024 434     QTC Calculation(Bazett) 10/07/2024 447     P Axis 10/07/2024 61     R [place] 10/07/2024 26     T [place] 10/07/2024 66     QRS Count 10/07/2024 10     Q Onset 10/07/2024 222     P Onset 10/07/2024 147     P Offset 10/07/2024 199     T Offset 10/07/2024 439     QTC Fredericia 10/07/2024 443     Troponin I, High Sensiti* 10/07/2024 12     AV pk luis 10/08/2024 1.67     AV mn grad 10/08/2024 5.4     LVOT diam 10/08/2024 2.08     MV E/A ratio 10/08/2024 1.04     LA vol index  A/L 10/08/2024 25.5     Tricuspid annular plane * 10/08/2024 1.5     LV EF 10/08/2024 68     RV free wall pk S' 10/08/2024 12.00     RVSP 10/08/2024 8.7     Aortic Valve Area by Con* 10/08/2024 2.71     Aortic Valve Area by Con* 10/08/2024 2.04     AV pk grad 10/08/2024 11.1     LV A4C EF 10/08/2024 61.8     Glucose 10/08/2024 121 (H)     Sodium 10/08/2024 134 (L)     Potassium 10/08/2024 4.0     Chloride 10/08/2024 99     Bicarbonate 10/08/2024 28     Anion Gap 10/08/2024 11     Urea Nitrogen 10/08/2024 14     Creatinine 10/08/2024 0.93     eGFR 10/08/2024 90     Calcium 10/08/2024 9.1     Magnesium 10/08/2024 1.70     Ventricular Rate 10/08/2024 63     Atrial Rate 10/08/2024 63     NE Interval 10/08/2024 146     QRS Duration 10/08/2024 84     QT Interval 10/08/2024 386     QTC Calculation(Bazett) 10/08/2024 395     P Axis 10/08/2024 60     R [place] 10/08/2024 25     T [place] 10/08/2024 68     QRS Count 10/08/2024 10     Q Onset 10/08/2024 220     P Onset 10/08/2024 147     P Offset 10/08/2024 199     T Offset 10/08/2024 413     QTC Fredericia 10/08/2024 392     Magnesium 10/09/2024 1.90     Glucose 10/09/2024 109 (H)     Sodium 10/09/2024 134 (L)     Potassium 10/09/2024 4.2     Chloride 10/09/2024 99     Bicarbonate 10/09/2024 28     Anion Gap 10/09/2024 11     Urea Nitrogen 10/09/2024 13     Creatinine 10/09/2024 0.97     eGFR 10/09/2024 86     Calcium 10/09/2024 9.3     Phosphorus 10/09/2024 3.4     Albumin 10/09/2024 3.9     WBC 10/09/2024 5.3     nRBC 10/09/2024 0.0     RBC 10/09/2024 7.50 (H)     Hemoglobin 10/09/2024 17.5     Hematocrit 10/09/2024 57.2 (H)     MCV 10/09/2024 76 (L)     MCH 10/09/2024 23.3 (L)     MCHC 10/09/2024 30.6 (L)     RDW 10/09/2024 18.1 (H)     Platelets 10/09/2024 279      The 10-year ASCVD risk score (Jesus LAUGHLIN, et al., 2019) is: 29.3%    Values used to calculate the score:      Age: 70 years      Sex: Male      Is Non- : No      Diabetic: Yes      Tobacco  smoker: No      Systolic Blood Pressure: 130 mmHg      Is BP treated: Yes      HDL Cholesterol: 40 MG/DL      Total Cholesterol: 131 MG/DL    Physical Exam  CONSTITUTIONAL - well nourished, well developed, looks like stated age, in no acute distress, not ill-appearing, and not tired appearing  SKIN - normal skin color and pigmentation, normal skin turgor without rash, lesions, or nodules visualized  HEAD - no trauma, normocephalic  EYES - normal external exam  CHEST - clear to auscultation, no wheezing, no crackles and no rales, good effort  CARDIAC - regular rate and regular rhythm, no skipped beats, no murmur  EXTREMITIES - no obvious or evident edema, no obvious or evident deformities  NEUROLOGICAL - alert, oriented and no focal signs  PSYCHIATRIC - alert, pleasant and cordial, age-appropriate      Assessment and Plan  Problem List Items Addressed This Visit       Anxiety     Stable on 10 mg Lexapro daily   Refills sent to preferred pharmacy          Essential hypertension    Relevant Orders    CBC and Auto Differential    Low testosterone in male     Free and total testosterone labs ordered as part of bloodwork panel, will discuss results and recommendations once labs are finalized         Relevant Orders    Testosterone, total and free    CHF (congestive heart failure), NYHA class I, acute on chronic, combined     No leg swelling, breathing well, clinical exam reassuring   Continue following with your established cardiologist  Lasix refills sent to your preferred pharmacy, please continue as directed           Relevant Medications    furosemide (Lasix) 40 mg tablet    Type 2 diabetes mellitus without complication, without long-term current use of insulin (Multi)     Currently on 5 mg Mounjaro weekly, tolerating well with no side effects  Has lost 20 pounds in the last 4 months  Patient has a couple weeks of current dosage left at home   A1C ordered as part of routine bloodwork, will titrate dosage of Mounjaro  accordingly after A1C results finalize   Of note, Mounjaro rx has to be sent to appening [place]          Relevant Orders    Hemoglobin A1C    Mixed hyperlipidemia    Annual physical exam     Complete physical exam performed  Discussed and recommended age appropriate vaccinations and screenings: colonoscopy and CT Heart Calcium Scoring ordered for patient to schedule and complete at earliest convenience     Yearly labs ordered for patient to complete at earliest convenience, will reach out with results and any recommendations once completed           Relevant Orders    Prostate Specific Antigen    Lipid Panel    TSH with reflex to Free T4 if abnormal    Comprehensive Metabolic Panel    CBC and Auto Differential    Colonoscopy Screening; Average Risk Patient    CT cardiac scoring wo IV contrast    Hyperlipidemia     Hyperlipidemia  Stable and well-controlled  Continue 20 mg simvastatin daily as prescribed  Refills sent to your preferred pharmacy           Relevant Medications    simvastatin (Zocor) 20 mg tablet    Hypertension     Hypertension  /80  in office today  Stable  Continue current medication regimen   Refills sent to your preferred pharmacy           Relevant Medications    olmesartan (BENIcar) 40 mg tablet    Depression     Stable on 10 mg Lexapro daily   Refills sent to preferred pharmacy          Relevant Medications    escitalopram (Lexapro) 10 mg tablet     Other Visit Diagnoses       Medicare annual wellness visit, subsequent    -  Primary    Relevant Orders    Colonoscopy Screening; Average Risk Patient    CT cardiac scoring wo IV contrast    Hemoglobin A1C    Prostate cancer screening        Relevant Orders    Prostate Specific Antigen          Patient understands and is agreeable with current plan. All questions were answered at this visit. Please follow up with the office if any additional questions arise.      Follow up as needed with any acute concerns, or at your next annual wellness  visit.     James Carlos DO  PGY-1, Family Medicine    Patient Care Team:  Pierce Leonard DO as PCP - General  Franklin Velasquez MD as PCP - [employer] PCP

## 2025-03-17 NOTE — ASSESSMENT & PLAN NOTE
Free and total testosterone labs ordered as part of bloodwork panel, will discuss results and recommendations once labs are finalized

## 2025-03-17 NOTE — ASSESSMENT & PLAN NOTE
No leg swelling, breathing well, clinical exam reassuring   Continue following with your established cardiologist  Lasix refills sent to your preferred pharmacy, please continue as directed

## 2025-03-17 NOTE — ASSESSMENT & PLAN NOTE
Hyperlipidemia  Stable and well-controlled  Continue 20 mg simvastatin daily as prescribed  Refills sent to your preferred pharmacy

## 2025-03-17 NOTE — ASSESSMENT & PLAN NOTE
Complete physical exam performed  Discussed and recommended age appropriate vaccinations and screenings: colonoscopy and CT Heart Calcium Scoring ordered for patient to schedule and complete at earliest convenience     Yearly labs ordered for patient to complete at earliest convenience, will reach out with results and any recommendations once completed

## 2025-03-30 LAB
ALBUMIN SERPL-MCNC: 4.3 G/DL (ref 3.6–5.1)
ALP SERPL-CCNC: 40 U/L (ref 35–144)
ALT SERPL-CCNC: 26 U/L (ref 9–46)
ANION GAP SERPL CALCULATED.4IONS-SCNC: 9 MMOL/L (CALC) (ref 7–17)
AST SERPL-CCNC: 19 U/L (ref 10–35)
BASOPHILS # BLD AUTO: 42 CELLS/UL (ref 0–200)
BASOPHILS NFR BLD AUTO: 0.8 %
BILIRUB SERPL-MCNC: 0.5 MG/DL (ref 0.2–1.2)
BUN SERPL-MCNC: 20 MG/DL (ref 7–25)
CALCIUM SERPL-MCNC: 9.7 MG/DL (ref 8.6–10.3)
CHLORIDE SERPL-SCNC: 105 MMOL/L (ref 98–110)
CHOLEST SERPL-MCNC: 113 MG/DL
CHOLEST/HDLC SERPL: 3.1 (CALC)
CO2 SERPL-SCNC: 25 MMOL/L (ref 20–32)
CREAT SERPL-MCNC: 0.89 MG/DL (ref 0.7–1.35)
EGFRCR SERPLBLD CKD-EPI 2021: 93 ML/MIN/1.73M2
EOSINOPHIL # BLD AUTO: 260 CELLS/UL (ref 15–500)
EOSINOPHIL NFR BLD AUTO: 4.9 %
ERYTHROCYTE [DISTWIDTH] IN BLOOD BY AUTOMATED COUNT: 16.2 % (ref 11–15)
EST. AVERAGE GLUCOSE BLD GHB EST-MCNC: 160 MG/DL
EST. AVERAGE GLUCOSE BLD GHB EST-SCNC: 8.9 MMOL/L
GLUCOSE SERPL-MCNC: 112 MG/DL (ref 65–99)
HBA1C MFR BLD: 7.2 % OF TOTAL HGB
HCT VFR BLD AUTO: 56.4 % (ref 38.5–50)
HDLC SERPL-MCNC: 37 MG/DL
HGB BLD-MCNC: 18.5 G/DL (ref 13.2–17.1)
LDLC SERPL CALC-MCNC: 48 MG/DL (CALC)
LYMPHOCYTES # BLD AUTO: 1399 CELLS/UL (ref 850–3900)
LYMPHOCYTES NFR BLD AUTO: 26.4 %
MCH RBC QN AUTO: 26.4 PG (ref 27–33)
MCHC RBC AUTO-ENTMCNC: 32.8 G/DL (ref 32–36)
MCV RBC AUTO: 80.6 FL (ref 80–100)
MONOCYTES # BLD AUTO: 488 CELLS/UL (ref 200–950)
MONOCYTES NFR BLD AUTO: 9.2 %
NEUTROPHILS # BLD AUTO: 3111 CELLS/UL (ref 1500–7800)
NEUTROPHILS NFR BLD AUTO: 58.7 %
NONHDLC SERPL-MCNC: 76 MG/DL (CALC)
PLATELET # BLD AUTO: 222 THOUSAND/UL (ref 140–400)
PMV BLD REES-ECKER: 10.4 FL (ref 7.5–12.5)
POTASSIUM SERPL-SCNC: 4.7 MMOL/L (ref 3.5–5.3)
PROT SERPL-MCNC: 7.1 G/DL (ref 6.1–8.1)
PSA SERPL-MCNC: 0.89 NG/ML
RBC # BLD AUTO: 7 MILLION/UL (ref 4.2–5.8)
SODIUM SERPL-SCNC: 139 MMOL/L (ref 135–146)
T4 FREE SERPL-MCNC: 1.1 NG/DL (ref 0.8–1.8)
TESTOST FREE SERPL-MCNC: 21.4 PG/ML (ref 35–155)
TESTOST SERPL-MCNC: 108 NG/DL (ref 250–1100)
TRIGL SERPL-MCNC: 222 MG/DL
TSH SERPL-ACNC: 0.17 MIU/L (ref 0.4–4.5)
WBC # BLD AUTO: 5.3 THOUSAND/UL (ref 3.8–10.8)

## 2025-04-03 DIAGNOSIS — E11.9 TYPE 2 DIABETES MELLITUS WITHOUT COMPLICATION, WITHOUT LONG-TERM CURRENT USE OF INSULIN: Primary | ICD-10-CM

## 2025-04-03 RX ORDER — TIRZEPATIDE 7.5 MG/.5ML
7.5 INJECTION, SOLUTION SUBCUTANEOUS WEEKLY
Qty: 2 ML | Refills: 1 | Status: SHIPPED | OUTPATIENT
Start: 2025-04-03

## 2025-04-09 ENCOUNTER — ANESTHESIA EVENT (OUTPATIENT)
Dept: OPERATING ROOM | Facility: HOSPITAL | Age: 70
End: 2025-04-09
Payer: MEDICARE

## 2025-04-10 ENCOUNTER — HOSPITAL ENCOUNTER (OUTPATIENT)
Dept: OPERATING ROOM | Facility: HOSPITAL | Age: 70
Setting detail: OUTPATIENT SURGERY
Discharge: HOME | End: 2025-04-10
Payer: MEDICARE

## 2025-04-10 ENCOUNTER — ANESTHESIA (OUTPATIENT)
Dept: OPERATING ROOM | Facility: HOSPITAL | Age: 70
End: 2025-04-10
Payer: MEDICARE

## 2025-04-10 VITALS
HEART RATE: 67 BPM | TEMPERATURE: 97.9 F | RESPIRATION RATE: 18 BRPM | SYSTOLIC BLOOD PRESSURE: 116 MMHG | WEIGHT: 295.42 LBS | OXYGEN SATURATION: 98 % | HEIGHT: 71 IN | BODY MASS INDEX: 41.36 KG/M2 | DIASTOLIC BLOOD PRESSURE: 57 MMHG

## 2025-04-10 DIAGNOSIS — Z00.00 ANNUAL PHYSICAL EXAM: ICD-10-CM

## 2025-04-10 DIAGNOSIS — Z00.00 MEDICARE ANNUAL WELLNESS VISIT, SUBSEQUENT: ICD-10-CM

## 2025-04-10 LAB — GLUCOSE BLD MANUAL STRIP-MCNC: 152 MG/DL (ref 74–99)

## 2025-04-10 PROCEDURE — A45385 PR COLONOSCOPY,REMV LESN,SNARE: Performed by: ANESTHESIOLOGY

## 2025-04-10 PROCEDURE — 7100000002 HC RECOVERY ROOM TIME - EACH INCREMENTAL 1 MINUTE: Performed by: ANESTHESIOLOGY

## 2025-04-10 PROCEDURE — 7100000001 HC RECOVERY ROOM TIME - INITIAL BASE CHARGE: Performed by: ANESTHESIOLOGY

## 2025-04-10 PROCEDURE — 3600000002 HC OR TIME - INITIAL BASE CHARGE - PROCEDURE LEVEL TWO: Performed by: ANESTHESIOLOGY

## 2025-04-10 PROCEDURE — 2500000004 HC RX 250 GENERAL PHARMACY W/ HCPCS (ALT 636 FOR OP/ED): Performed by: REGISTERED NURSE

## 2025-04-10 PROCEDURE — 45385 COLONOSCOPY W/LESION REMOVAL: CPT | Performed by: SURGERY

## 2025-04-10 PROCEDURE — 3700000002 HC GENERAL ANESTHESIA TIME - EACH INCREMENTAL 1 MINUTE: Performed by: ANESTHESIOLOGY

## 2025-04-10 PROCEDURE — 7100000010 HC PHASE TWO TIME - EACH INCREMENTAL 1 MINUTE: Performed by: ANESTHESIOLOGY

## 2025-04-10 PROCEDURE — 7100000009 HC PHASE TWO TIME - INITIAL BASE CHARGE: Performed by: ANESTHESIOLOGY

## 2025-04-10 PROCEDURE — 82947 ASSAY GLUCOSE BLOOD QUANT: CPT

## 2025-04-10 PROCEDURE — 3600000007 HC OR TIME - EACH INCREMENTAL 1 MINUTE - PROCEDURE LEVEL TWO: Performed by: ANESTHESIOLOGY

## 2025-04-10 PROCEDURE — 3700000001 HC GENERAL ANESTHESIA TIME - INITIAL BASE CHARGE: Performed by: ANESTHESIOLOGY

## 2025-04-10 PROCEDURE — A45385 PR COLONOSCOPY,REMV LESN,SNARE: Performed by: REGISTERED NURSE

## 2025-04-10 PROCEDURE — 2500000004 HC RX 250 GENERAL PHARMACY W/ HCPCS (ALT 636 FOR OP/ED): Performed by: ANESTHESIOLOGY

## 2025-04-10 RX ORDER — DROPERIDOL 2.5 MG/ML
0.62 INJECTION, SOLUTION INTRAMUSCULAR; INTRAVENOUS ONCE AS NEEDED
Status: DISCONTINUED | OUTPATIENT
Start: 2025-04-10 | End: 2025-04-11 | Stop reason: HOSPADM

## 2025-04-10 RX ORDER — ONDANSETRON HYDROCHLORIDE 2 MG/ML
4 INJECTION, SOLUTION INTRAVENOUS ONCE AS NEEDED
Status: DISCONTINUED | OUTPATIENT
Start: 2025-04-10 | End: 2025-04-11 | Stop reason: HOSPADM

## 2025-04-10 RX ORDER — PROPOFOL 10 MG/ML
INJECTION, EMULSION INTRAVENOUS AS NEEDED
Status: DISCONTINUED | OUTPATIENT
Start: 2025-04-10 | End: 2025-04-10

## 2025-04-10 RX ORDER — MIDAZOLAM HYDROCHLORIDE 1 MG/ML
INJECTION, SOLUTION INTRAMUSCULAR; INTRAVENOUS AS NEEDED
Status: DISCONTINUED | OUTPATIENT
Start: 2025-04-10 | End: 2025-04-10

## 2025-04-10 RX ORDER — SODIUM CHLORIDE, SODIUM LACTATE, POTASSIUM CHLORIDE, CALCIUM CHLORIDE 600; 310; 30; 20 MG/100ML; MG/100ML; MG/100ML; MG/100ML
20 INJECTION, SOLUTION INTRAVENOUS CONTINUOUS
Status: DISCONTINUED | OUTPATIENT
Start: 2025-04-10 | End: 2025-04-11 | Stop reason: HOSPADM

## 2025-04-10 RX ADMIN — PROPOFOL 80 MG: 10 INJECTION, EMULSION INTRAVENOUS at 10:01

## 2025-04-10 RX ADMIN — MIDAZOLAM 1 MG: 1 INJECTION INTRAMUSCULAR; INTRAVENOUS at 09:57

## 2025-04-10 RX ADMIN — MIDAZOLAM 1 MG: 1 INJECTION INTRAMUSCULAR; INTRAVENOUS at 10:01

## 2025-04-10 RX ADMIN — SODIUM CHLORIDE, SODIUM LACTATE, POTASSIUM CHLORIDE, AND CALCIUM CHLORIDE 20 ML/HR: .6; .31; .03; .02 INJECTION, SOLUTION INTRAVENOUS at 09:06

## 2025-04-10 RX ADMIN — PROPOFOL 200 MCG/KG/MIN: 10 INJECTION, EMULSION INTRAVENOUS at 10:02

## 2025-04-10 SDOH — HEALTH STABILITY: MENTAL HEALTH: CURRENT SMOKER: 0

## 2025-04-10 ASSESSMENT — PAIN - FUNCTIONAL ASSESSMENT: PAIN_FUNCTIONAL_ASSESSMENT: 0-10

## 2025-04-10 ASSESSMENT — PAIN SCALES - GENERAL
PAIN_LEVEL: 2
PAINLEVEL_OUTOF10: 0 - NO PAIN
PAINLEVEL_OUTOF10: 0 - NO PAIN

## 2025-04-10 NOTE — DISCHARGE INSTRUCTIONS
Patient Instructions after a Colonoscopy      The anesthetics, sedatives or narcotics which were given to you today will be acting in your body for the next 24 hours, so you might feel a little sleepy or groggy.  This feeling should slowly wear off. Carefully read and follow the instructions.     You received sedation today:  - Do not drive or operate any machinery or power tools of any kind.   - No alcoholic beverages today, not even beer or wine.  - Do not make any important decisions or sign any legal documents.  - No over the counter medications that contain alcohol or that may cause drowsiness.  - Do not make any important decisions or sign any legal documents.  - Make sure you have someone with you for first 24 hours.    While it is common to experience mild to moderate abdominal distention, gas, or belching after your procedure, if any of these symptoms occur following discharge from the GI Lab or within one week of having your procedure, call the [facility] to be advised whether a visit to your nearest Urgent Care or Emergency Department is indicated.  Take this paper with you if you go.     - If you develop an allergic reaction to the medications that were given during your procedure such as difficulty breathing, rash, hives, severe nausea, vomiting or lightheadedness.  - If you experience chest pain, shortness of breath, severe abdominal pain, fevers and chills.  -If you develop signs and symptoms of bleeding such as blood in your spit, if your stools turn black, tarry, or bloody  - If you have not urinated within 8 hours following your procedure.  - If your IV site becomes painful, red, inflamed, or looks infected.    If you received a biopsy/polypectomy/sphincterotomy the following instructions apply below:    __ Do not use Aspirin containing products, non-steroidal medications or anti-coagulants for one week following your procedure. (Examples of these types of medications are: Advil,  Arthrotec, Aleve, Coumadin, Ecotrin, Heparin, Ibuprofen, Indocin, Motrin, Naprosyn, Nuprin, Plavix, Vioxx, and Voltarin, or their generic forms.  This list is not all-inclusive.  Check with your physician or pharmacist before resuming medications.)   __ Eat a soft diet today.  Avoid foods that are poorly digested for the next 24 hours.  These foods would include: nuts, beans, lettuce, red meats, and fried foods. Start with liquids and advance your diet as tolerated, gradually work up to eating solids.   __ Do not have a Barium Study or Enema for one week.    Your physician recommends the additional following instructions:    -You have a contact number available for emergencies. The signs and symptoms of potential delayed complications were discussed with you. You may return to normal activities tomorrow.  -Resume your previous diet.  -Continue your present medications.   -We are waiting for your pathology results.  -Your physician has recommended a repeat colonoscopy (date to be determined after pending pathology results are reviewed) for surveillance based on pathology results.  -The findings and recommendations have been discussed with you.  -The findings and recommendations were discussed with your family.  - Please see Medication Reconciliation Form for new medication/medications prescribed.       If you experience any problems or have any questions following discharge from the GI Lab, please call:        Nurse Signature                                                                        Date___________________                                                                            Patient/Responsible Party Signature                                        Date___________________

## 2025-04-10 NOTE — H&P
General Surgery History and Physical    Referring Provider:  DO Charlie Jackson Olivia, DO     Chief Complaint:  Colonoscopy    History of Present Illness:  This is a 70 y.o. male who presents for a colonoscopy.    Past Medical History:  Past Medical History:   Diagnosis Date    Anesthesia     anger, agitated , slighly violent    Anxiety     Class 3 severe obesity with body mass index (BMI) of 40.0 to 44.9 in adult 03/17/2025    Contusion of lung 10/07/2024    Fracture of multiple ribs 10/07/2024    Fracture of transverse process of lumbar vertebra (Multi) 10/07/2024    Hyperlipidemia     Hypertension     Low testosterone in male     Medicare annual wellness visit, subsequent 04/10/2025    Colonoscopy    Obesity     Sleep apnea     does juhi use any device    Type 2 diabetes mellitus without complication, without long-term current use of insulin 03/17/2025        Past Surgical History:  Past Surgical History:   Procedure Laterality Date    APPENDECTOMY      CATARACT EXTRACTION Bilateral     CHOLECYSTECTOMY      KNEE SURGERY Right     meniscus repair    NASAL SEPTUM SURGERY      VASECTOMY          Medications:  Current Outpatient Medications   Medication Instructions    acetaminophen (TylenoL) 325 mg tablet Every 6 hours    amLODIPine (NORVASC) 10 mg, oral, Daily    chlorhexidine (Peridex) 0.12 % solution 15 mL, Mouth/Throat, As needed    escitalopram (LEXAPRO) 10 mg, oral, Daily    furosemide (LASIX) 40 mg, oral, Daily    Mounjaro 5 mg/0.5 mL pen injector INJECT 5 (FIVE) mg SUBCUTANEOUSLY (UNDER THE SKIN) ONCE A WEEK    Mounjaro 7.5 mg, subcutaneous, Weekly    multivitamin with minerals iron-free (Centrum Silver) 1 tablet, Daily    olmesartan (BENICAR) 40 mg, oral, Daily    simvastatin (ZOCOR) 20 mg, oral, Daily    testosterone 20.25 mg/1.25 gram (1.62 %) gel in metered-dose pump use 2 pumps EVERY DAY AS DIRECTED        Allergies:  Allergies   Allergen Reactions    Bee Venom Protein (Honey Bee) Swelling     Codeine Other    Penicillins Unknown     Makes nerve endings tingle; tolerating amp/sulbactam 2023    Latex Rash and Unknown        Family History:  No family history on file.     Social History:  Social History     Socioeconomic History    Marital status:    Tobacco Use    Smoking status: Former     Current packs/day: 0.00     Types: Cigarettes     Quit date:      Years since quittin.2     Passive exposure: Never    Smokeless tobacco: Never   Vaping Use    Vaping status: Never Used   Substance and Sexual Activity    Alcohol use: Yes     Comment: occ    Drug use: Yes     Types: Marijuana     Comment: on vacation, last use     Sexual activity: Defer     Social Drivers of Health     Financial Resource Strain: Low Risk  (10/7/2024)    Overall Financial Resource Strain (CARDIA)     Difficulty of Paying Living Expenses: Not very hard   Food Insecurity: No Food Insecurity (10/7/2024)    Hunger Vital Sign     Worried About Running Out of Food in the Last Year: Never true     Ran Out of Food in the Last Year: Never true   Transportation Needs: No Transportation Needs (10/7/2024)    PRAPARE - Transportation     Lack of Transportation (Medical): No     Lack of Transportation (Non-Medical): No   Intimate Partner Violence: Not At Risk (10/7/2024)    Humiliation, Afraid, Rape, and Kick questionnaire     Fear of Current or Ex-Partner: No     Emotionally Abused: No     Physically Abused: No     Sexually Abused: No   Housing Stability: Low Risk  (10/7/2024)    Housing Stability Vital Sign     Unable to Pay for Housing in the Last Year: No     Number of Times Moved in the Last Year: 1     Homeless in the Last Year: No        Review of Systems:  A complete 12 point review of systems was performed and is negative except as noted in the history of present illness.      Vital Signs:  Vitals:    04/10/25 0832   BP: 138/78   Pulse: 74   Resp: 18   Temp: 36.1 °C (97 °F)   SpO2: 96%          Physical  "Exam:  General: No acute distress. Sitting up in bed.   Neuro: Alert and oriented ×3. Follows commands.  Head: Atraumatic  Eyes: Pupils equal reactive to light. Extraocular motions intact.  Ears: Hears normal speaking voice.  Mouth, Nose, Throat: Mucous membranes moist.  Normal dentition.  Neck: Supple. No appreciable masses.  Chest: No crepitus.    Heart: Regular rate and rhythm.  Lung: Clear to auscultation bilaterally.  Vascular: No carotid bruits.  Palpable radial pulses bilaterally.  Abdomen: Soft. Nondistended. Nontender.    Musculoskeletal: Moves all extremities.  Normal range of motion.  Lymphatic: No palpable lymph nodes.  Skin: No rashes or lesions.  Psychological: Normal affect      Laboratory Values:  CBC: No results found for: \"WBC\", \"RBC\", \"HGB\", \"HCT\", \"PLT\"    RFP: No results found for: \"GLUF\", \"NA\", \"K\", \"CL\", \"CO2\", \"BUN\", \"CREATININE\", \"CALCIUM\", \"MG\", \"PHOS\"     LFTs: No results found for: \"PROT\", \"ALBUMIN\", \"BILITOT\", \"BILIDIR\", \"ALKPHOS\", \"AST\", \"ALT\"         Assessment:  This is a 70 y.o. male who presents for a colonoscopy.      Plan:  -- Colonoscopy.      Earl Yusuf MD  General Surgery  Office: [phone]  Fax:     [phone]  9:38 AM   04/10/25     "

## 2025-04-10 NOTE — ANESTHESIA POSTPROCEDURE EVALUATION
Patient: Gen Solis II    Procedure Summary       Date: 04/10/25 Room / Location: [facility] OR    Anesthesia Start: 0956 Anesthesia Stop: 1044    Procedure: COLONOSCOPY Diagnosis:       Medicare annual wellness visit, subsequent      Annual physical exam    Scheduled Providers: Hay Yusuf MD; Arun Vidal MD Responsible Provider: Arun Vidal MD    Anesthesia Type: MAC ASA Status: 3            Anesthesia Type: MAC    Vitals Value Taken Time   /63 04/10/25 1053   Temp 36.6 °C (97.9 °F) 04/10/25 1038   Pulse 61 04/10/25 1053   Resp 18 04/10/25 1053   SpO2 94 % 04/10/25 1053       Anesthesia Post Evaluation    Patient location during evaluation: PACU  Patient participation: complete - patient participated  Level of consciousness: awake  Pain score: 2  Pain management: adequate  Multimodal analgesia pain management approach  Airway patency: patent  Two or more strategies used to mitigate risk of obstructive sleep apnea  Cardiovascular status: acceptable  Respiratory status: acceptable  Hydration status: acceptable  Postoperative Nausea and Vomiting: none    There were no known notable events for this encounter.

## 2025-04-10 NOTE — ANESTHESIA PREPROCEDURE EVALUATION
Patient: Leisa Jean II    Procedure Information       Anesthesia Start Date/Time: 04/10/25 0956    Scheduled providers: Hay Yusuf MD; Arun Vidal MD    Procedure: COLONOSCOPY    Location: [facility] OR            Relevant Problems   Anesthesia (within normal limits)  Per chart review, notes say pt was agitated and angry after anesthesia in the past.   Per patient he's had no issues waking up from anesthesia.       Cardiac   (+) Essential hypertension   (+) Hyperlipidemia   (+) Hypertension   (+) Mixed hyperlipidemia   (+) Peripheral vascular disease, unspecified (CMS-HCC)      Pulmonary  Snores (likely undiagnosed JAMIL)      Neuro   (+) Anxiety   (+) Depression      GI (within normal limits)      /Renal (within normal limits)      Liver (within normal limits)      Endocrine   (+) Morbid (severe) obesity due to excess calories (Multi)   (+) Type 2 diabetes mellitus without complication, without long-term current use of insulin      Hematology (within normal limits)      Musculoskeletal   (+) Primary osteoarthritis of both shoulders      ID   (+) COVID   (+) COVID-19        Transthoracic Echo (TTE) Complete    Result Date: 10/8/2024   [facility], [address] [place] [address]               Tel [phone] and Fax [phone] TRANSTHORACIC ECHOCARDIOGRAM REPORT  Patient Name:      LEISA JEAN         Magdi Physician:    [id number] Lowry MD Study Date:        10/8/2024            Ordering Provider:    [id number] NADEEM KELLEY MRN/PID:           [id number]             Fellow: Accession#:        [id number]         Nurse: Date of Birth/Age: 1955 / 69 years Sonographer:          Paradise López                                                               [facility] Gender:            M                    Additional Staff: Height:             154.94 cm            Admit Date:           10/7/2024 Weight:            142.43 kg            Admission Status:     Inpatient -                                                               Routine BSA / BMI:         2.29 m2 / 59.33      Encounter#:           [id number]                    kg/m2 Blood Pressure:    162/67 mmHg          Department Location:  [facility] Non                                                               Invasive Study Type:    TRANSTHORACIC ECHO (TTE) COMPLETE Diagnosis/ICD: Heart failure, unspecified-I50.9; Acute on chronic combined                systolic (congestive) and diastolic (congestive) heart failure                (CHF)-I50.43 Indication:    Congestive Heart Failure CPT Code:      Echo Complete w Full Doppler-[id number] Patient History: Pertinent History: LE Edema and Dyspnea. Study Detail: The following Echo studies were performed: 2D, M-Mode, Doppler and               color flow. Technically challenging study due to body habitus,               prominent lung artifact and COVID protocol. Unable to obtain RA,RV               and suprasternal notch view. The patient was awake.  PHYSICIAN INTERPRETATION: Left Ventricle: Left ventricular ejection fraction is normal, by visual estimate at 65-70%. The left ventricular cavity size was not assessed. Spectral Doppler shows a normal pattern of left ventricular diastolic filling. Left Atrium: The left atrium was not well visualized. Probably normal size. Right Ventricle: The right ventricle is normal in size. There is normal right ventricular global systolic function. Right Atrium: The right atrium was not well visualized. Probably normal size. Aortic Valve: The aortic valve is probably trileaflet. There is minimal aortic valve cusp calcification. The aortic valve dimensionless index is 0.80. There is no evidence of aortic valve regurgitation. The peak instantaneous gradient of the aortic valve is 11.1 mmHg. The mean gradient of the aortic  valve is 5.4 mmHg. Mitral Valve: The mitral valve is mildly thickened. There is mild mitral annular calcification. There is trace to mild mitral valve regurgitation. Tricuspid Valve: The tricuspid valve was not well visualized. There is trace tricuspid regurgitation. The right ventricular systolic pressure is unable to be estimated. Pulmonic Valve: The pulmonic valve is not well visualized. There is physiologic pulmonic valve regurgitation. Pericardium: There is no pericardial effusion noted. Aorta: The aortic root is normal. Systemic Veins: The inferior vena cava appears normal in size, with IVC inspiratory collapse greater than 50%. In comparison to the previous echocardiogram(s): There are no prior studies on this patient for comparison purposes.  CONCLUSIONS:  1. Poorly visualized anatomical structures due to suboptimal image quality.  2. Left ventricular ejection fraction is normal, by visual estimate at 65-70%.  3. There is normal right ventricular global systolic function. QUANTITATIVE DATA SUMMARY:  2D MEASUREMENTS:          Normal Ranges: LVEDV Index:     33 ml/m2  LA VOLUME:                    Normal Ranges: LA Vol A4C:        57.5 ml    (22+/-6mL/m2) LA Vol A2C:        54.3 ml LA Vol BP:         58.4 ml LA Vol Index A4C:  25.1 ml/m2 LA Vol Index A2C:  23.7 ml/m2 LA Vol Index BP:   25.5 ml/m2 LA Area A4C:       19.8 cm2 LA Area A2C:       18.4 cm2 LA Major Axis A4C: 5.8 cm LA Major Axis A2C: 5.3 cm LA Vol A4C:        52.7 ml LA Vol A2C:        49.9 ml LA Vol Index BSA:  22.4 ml/m2  AORTA MEASUREMENTS:         Normal Ranges: Ao Sinus, d:        2.90 cm (2.1-3.5cm) Asc Ao, d:          3.30 cm (2.1-3.4cm)  LV SYSTOLIC FUNCTION BY 2D PLANIMETRY (MOD):                      Normal Ranges: EF-A4C View:    62 % (>=55%) EF-A2C View:    56 % EF-Biplane:     58 % EF-Visual:      68 % LV EF Reported: 68 %  LV DIASTOLIC FUNCTION:             Normal Ranges: MV Peak E:             0.72 m/s    (0.7-1.2 m/s) MV Peak A:              0.69 m/s    (0.42-0.7 m/s) E/A Ratio:             1.04        (1.0-2.2) MV e'                  0.085 m/s   (>8.0) MV lateral e'          0.07 m/s MV medial e'           0.10 m/s MV A Dur:              128.45 msec E/e' Ratio:            8.53        (<8.0)  MITRAL VALVE:          Normal Ranges: MV DT:        192 msec (150-240msec)  AORTIC VALVE:                      Normal Ranges: AoV Vmax:                1.67 m/s  (<=1.7m/s) AoV Peak P.1 mmHg (<20mmHg) AoV Mean P.4 mmHg  (1.7-11.5mmHg) LVOT Max Pramod:            1.00 m/s  (<=1.1m/s) AoV VTI:                 28.41 cm  (18-25cm) LVOT VTI:                22.66 cm LVOT Diameter:           2.08 cm   (1.8-2.4cm) AoV Area, VTI:           2.71 cm2  (2.5-5.5cm2) AoV Area,Vmax:           2.04 cm2  (2.5-4.5cm2) AoV Dimensionless Index: 0.80  RIGHT VENTRICLE: TAPSE: 15.0 mm RV s'  0.12 m/s  TRICUSPID VALVE/RVSP:          Normal Ranges: Peak TR Velocity:     0.43 m/s RV Syst Pressure:     9 mmHg   (< 30mmHg) IVC Diam:             1.80 cm  PULMONIC VALVE:          Normal Ranges: PV Max Pramod:     0.9 m/s  (0.6-0.9m/s) PV Max PG:      3.4 mmHg  AORTA: Asc Ao Diam 3.27 cm  [id number] Brittany Lowry MD Electronically signed on 10/8/2024 at 11:56:09 AM  ** Final **       Encounter Date: 10/07/24   ECG 12 Lead   Result Value    Ventricular Rate 63    Atrial Rate 63    OR Interval 146    QRS Duration 84    QT Interval 386    QTC Calculation(Bazett) 395    P Axis 60    R Axis 25    T Axis 68    QRS Count 10    Q Onset 220    P Onset 147    P Offset 199    T Offset 413    QTC Fredericia 392    Narrative    Normal sinus rhythm  Normal ECG  When compared with ECG of 23-JUL-2024 08:04,  No significant change was found  See ED provider note for full interpretation and clinical correlation  Confirmed by Blank Cross ([id number]) on 10/9/2024 11:08:11 AM        Clinical information reviewed:   Tobacco  Allergies  Meds  Problems  Med Hx  Surg Hx   Fam Hx   Soc   Hx        NPO Detail:  NPO/Void Status  Date of Last Liquid: 04/10/25  Time of Last Liquid: 0620  Date of Last Solid: 04/09/25  Time of Last Solid: 0800 (kalyn benito)         Physical Exam    Airway  Mallampati: II  Neck ROM: full     Cardiovascular - normal exam     Dental - normal exam     Pulmonary - normal exam     Abdominal   (+) obese             Anesthesia Plan    History of general anesthesia?: yes  History of complications of general anesthesia?: no    ASA 3     MAC     The patient is not a current smoker.    intravenous induction   Anesthetic plan and risks discussed with patient and spouse.    Plan discussed with attending.

## 2025-04-19 LAB
LABORATORY COMMENT REPORT: NORMAL
PATH REPORT.FINAL DX SPEC: NORMAL
PATH REPORT.GROSS SPEC: NORMAL
PATH REPORT.RELEVANT HX SPEC: NORMAL
PATH REPORT.TOTAL CANCER: NORMAL

## 2025-05-21 ENCOUNTER — TELEPHONE (OUTPATIENT)
Facility: CLINIC | Age: 70
End: 2025-05-21
Payer: MEDICARE

## 2025-05-28 ENCOUNTER — OFFICE VISIT (OUTPATIENT)
Dept: ORTHOPEDIC SURGERY | Facility: HOSPITAL | Age: 70
End: 2025-05-28
Payer: MEDICARE

## 2025-05-28 ENCOUNTER — HOSPITAL ENCOUNTER (OUTPATIENT)
Dept: RADIOLOGY | Facility: HOSPITAL | Age: 70
Discharge: HOME | End: 2025-05-28
Payer: MEDICARE

## 2025-05-28 VITALS — HEIGHT: 71 IN | WEIGHT: 285 LBS | BODY MASS INDEX: 39.9 KG/M2

## 2025-05-28 DIAGNOSIS — M19.011 ARTHRITIS OF RIGHT SHOULDER: ICD-10-CM

## 2025-05-28 DIAGNOSIS — M75.21 BICEPS TENDINITIS OF RIGHT SHOULDER: ICD-10-CM

## 2025-05-28 DIAGNOSIS — M19.011 ARTHRITIS OF RIGHT SHOULDER REGION: ICD-10-CM

## 2025-05-28 DIAGNOSIS — M25.511 RIGHT SHOULDER PAIN, UNSPECIFIED CHRONICITY: ICD-10-CM

## 2025-05-28 DIAGNOSIS — M75.121 COMPLETE TEAR OF RIGHT ROTATOR CUFF, UNSPECIFIED WHETHER TRAUMATIC: Primary | ICD-10-CM

## 2025-05-28 PROCEDURE — 99205 OFFICE O/P NEW HI 60 MIN: CPT | Performed by: ORTHOPAEDIC SURGERY

## 2025-05-28 PROCEDURE — 73030 X-RAY EXAM OF SHOULDER: CPT | Mod: RT

## 2025-05-28 PROCEDURE — 1159F MED LIST DOCD IN RCRD: CPT | Performed by: ORTHOPAEDIC SURGERY

## 2025-05-28 PROCEDURE — 1160F RVW MEDS BY RX/DR IN RCRD: CPT | Performed by: ORTHOPAEDIC SURGERY

## 2025-05-28 PROCEDURE — 73030 X-RAY EXAM OF SHOULDER: CPT | Mod: RIGHT SIDE | Performed by: RADIOLOGY

## 2025-05-28 PROCEDURE — 3008F BODY MASS INDEX DOCD: CPT | Performed by: ORTHOPAEDIC SURGERY

## 2025-05-28 PROCEDURE — E0218 FLUID CIRC COLD PAD W PUMP: HCPCS | Performed by: ORTHOPAEDIC SURGERY

## 2025-05-28 PROCEDURE — L3670 SO ACRO/CLAV CAN WEB PRE OTS: HCPCS | Performed by: ORTHOPAEDIC SURGERY

## 2025-05-28 PROCEDURE — 1125F AMNT PAIN NOTED PAIN PRSNT: CPT | Performed by: ORTHOPAEDIC SURGERY

## 2025-05-28 PROCEDURE — 1036F TOBACCO NON-USER: CPT | Performed by: ORTHOPAEDIC SURGERY

## 2025-05-28 PROCEDURE — 4010F ACE/ARB THERAPY RXD/TAKEN: CPT | Performed by: ORTHOPAEDIC SURGERY

## 2025-05-28 ASSESSMENT — PAIN SCALES - GENERAL: PAINLEVEL_OUTOF10: 2

## 2025-05-28 ASSESSMENT — PAIN - FUNCTIONAL ASSESSMENT: PAIN_FUNCTIONAL_ASSESSMENT: 0-10

## 2025-05-28 NOTE — PROGRESS NOTES
62-year-old male with longstanding bilateral shoulder problems.  Patient states his right shoulder has been bothering him for at least 2 to 3 years.  Patient was originally scheduled by Dr. Belle to have a right reverse shoulder replacement but then in October 2024 got COVID and got very sick and surgery was canceled.  He has since recovered and has been cleared for surgery by cardiology.  However Dr. Belle is moving and referred him to see me in consultation.  Patient complains of severe right shoulder pain with inability to raise the arm.  She he is already failed physical therapy 6 months ago with no improvements numerous rounds of steroids injections and anti-inflammatories with no improvement    Patients' self reported past medical history, medications, allergies, surgical history, family and social history as well as a 10 point review of systems has been documented in the new patient intake form and scanned into the patient's electronic medical record.  The intake form was reviewed by Dr Hirsch during the office visit and signed by Dr. Hirsch and the patient.  Pertinent findings are documented in the HPI.    General Multi-System Physical Exam:  Constitutional  General appearance:  Alert, oriented, and in no acute distress.  Well developed, well nourished.  Head and Face  Head and face:  Normocephalic and atraumatic.  Ears, Nose, Mouth, and Throat  External inspection of ears and nose: Normal.  Eyes:  Pupils are equal and round.  Neck  Neck:  no neck mass was observed.  Pulmonary  Respiratory effort:  no respiratory distress.  Cardiovascular  Intact distal pulses.  Lymphatic  Palpation of lymph nodes in the affected extremity:  Normal.  Skin  Skin and subcutaneous tissue:  Normal skin color and pigmentation.  Normal skin turgor.  No rashes.  Neurologic  Sensation:  normal to light touch.  Psychiatric  Judgement and insight:  Intact.  Mood and affect:  Normal.  Musculoskeletal  Right shoulder pseudoparalysis  with only 45 degrees of active abduction forward flexion passively is 90 degrees of abduction forward flexion of the right shoulder 30 degrees of external rotation internally rotates to greater trochanter.  Motion of the right shoulder has positive grinding consistent with severe arthritis.  He has positive Neer positive Fletcher positive Jobes with pain and severe weakness positive biceps tenderness the bicipital groove.  Grossly neurovasc intact right upper extremity    X-rays of the patient were ordered by Dr Hirsch and obtained today.  Dr Hirsch personally reviewed the results of the x-rays.    In addition, Dr Hirsch independently interpreted the patient's x-rays (performed by the Radiology department) by viewing the x-ray images and this is Dr. Hirsch's personal interpretation:     Right shoulder severe glenohumeral joint arthritis    We talked about his severe arthritis and rotator cuff tearing based on physical examination.  We talked about conservative treatment but he is already failed numerous rounds of steroid injections and physical therapy 6 months ago.  We talked about a right shoulder reverse total shoulder arthroplasty with biceps tenodesis done at the same time due to his proximal biceps pain.  Talked about risks of surgery including risk of acromial stress fracture infection dislocation and axillary nerve injury.  Patient wants to proceed with a right shoulder reverse total shoulder arthroplasty biceps tenodesis on July 8.  See him back for surgery sooner if necessary        I, Dr. Hirsch, had a long discussion with the patient / patient's family regarding the risks versus benefits of surgery and all of the treatment options, including nonoperative treatment and surgical treatment options. We discussed the risks of surgery.      The risks of surgery include, but are not limited to, nerve damage, artery damage, muscle damage, risk of bleeding or infection, risk of bone fracture, risk of  "post-operative stiffness, risk of failure of the surgery with possible continued pain or possible need for additional surgery.  Other risks include the risk of hardware pain and possible need for removal of the surgical hardware at another time.   The risks of undergoing anesthesia including, but are not limited to, stroke, heart attack or death.      After a long discussion, the patient / patient's family understood all of the risks versus benefits of surgery and decided that they wanted to proceed with surgery. The patient / guardian signed appropriate surgical consent forms.    Since this patient will be undergoing surgery, I expect the patient to be in significant additional pain in the immediate postoperative period as a result of the surgical procedure. Non-opioid pain medications will not be sufficient to treat this acute, sharp, postoperative pain. Therefore we will be prescribing the patient narcotic pain medications for the immediate postoperative period in addition to numerous non-narcotic pain medications in order to try to help the patient with their post-operative pain.  However, as the pain from surgery subsides, we will work diligently to wean the patient off of all narcotics as quickly as possible.   If the patient requires more narcotic pain medications than we typically see with patients undergoing this surgery, we will refer the patient to a pain management specialist within the first few weeks after their surgical procedure. The patient's OARRS report was reviewed within the last 90 days.      The patient's height and weight were documented today in the vitals tab in the patient's EPIC chart, and the patient's BMI was calculated.  A follow up plan was then developed by Dr. Hirsch, per  mandated guidelines, based upon the patient's BMI and the follow up plan was documented in the \"Patient Instructions\" section of the chart.  Weight loss can be achieved by decreasing the amount of calories " consumed daily and by increasing daily physical activity.  We recommend finding physical activities that you can participate in regularly and you enjoy which do not worsen your current joint pains.       This patient has had longstanding pain and weakness in their affected extremity which has gotten significantly worse over the last few months.  Non-operative treatment has failed to help this patient and the pain is severe enough to warrent surgery as the appropriate treatment at this time.  That would classify this problem as a chronic illness with severe exacerbation and progression.    We discussed their surgery at great length.  This is an elective major surgery which is warranted in this case due to the patient's failure of non-operative treatment and their significant level of pain and progression of the disease.  We discussed identified patient and procedural risk factors and how these risk factors may increase the risk of the surgery or influence the outcome of the surgical procedure.  With this procedure, procedural risk factors include, but are not limited to, the risk of infection, bleeding, possible nerve or vasculature injury, genia fracture, and the possible risk of continued pain or weakness after the operation.  We also discussed how delaying surgery and continuing non-operative treatment may lead to a progression of the disease and high risk of further morbidity.     We will get a CT preoperatively for preoperative planning during the surgery

## 2025-05-29 ENCOUNTER — HOSPITAL ENCOUNTER (OUTPATIENT)
Dept: RADIOLOGY | Facility: CLINIC | Age: 70
Discharge: HOME | End: 2025-05-29
Payer: MEDICARE

## 2025-05-29 DIAGNOSIS — Z00.00 ANNUAL PHYSICAL EXAM: ICD-10-CM

## 2025-05-29 DIAGNOSIS — Z00.00 MEDICARE ANNUAL WELLNESS VISIT, SUBSEQUENT: ICD-10-CM

## 2025-05-29 PROCEDURE — 75571 CT HRT W/O DYE W/CA TEST: CPT

## 2025-05-30 DIAGNOSIS — E11.9 TYPE 2 DIABETES MELLITUS WITHOUT COMPLICATION, WITHOUT LONG-TERM CURRENT USE OF INSULIN: ICD-10-CM

## 2025-05-30 RX ORDER — TIRZEPATIDE 7.5 MG/.5ML
7.5 INJECTION, SOLUTION SUBCUTANEOUS WEEKLY
Qty: 2 ML | Refills: 1 | Status: CANCELLED | OUTPATIENT
Start: 2025-05-30

## 2025-05-30 RX ORDER — TIRZEPATIDE 7.5 MG/.5ML
7.5 INJECTION, SOLUTION SUBCUTANEOUS WEEKLY
Qty: 2 ML | Refills: 1 | Status: SHIPPED | OUTPATIENT
Start: 2025-05-30

## 2025-06-12 ENCOUNTER — APPOINTMENT (OUTPATIENT)
Dept: RADIOLOGY | Facility: CLINIC | Age: 70
End: 2025-06-12
Payer: MEDICARE

## 2025-06-12 DIAGNOSIS — M19.011 ARTHRITIS OF RIGHT SHOULDER: ICD-10-CM

## 2025-06-12 PROCEDURE — 73200 CT UPPER EXTREMITY W/O DYE: CPT | Mod: RT

## 2025-06-25 ENCOUNTER — PRE-ADMISSION TESTING (OUTPATIENT)
Dept: PREADMISSION TESTING | Facility: HOSPITAL | Age: 70
End: 2025-06-25
Payer: MEDICARE

## 2025-06-25 VITALS
SYSTOLIC BLOOD PRESSURE: 131 MMHG | OXYGEN SATURATION: 96 % | BODY MASS INDEX: 40.04 KG/M2 | HEART RATE: 72 BPM | HEIGHT: 71 IN | WEIGHT: 286 LBS | DIASTOLIC BLOOD PRESSURE: 78 MMHG | TEMPERATURE: 97 F | RESPIRATION RATE: 17 BRPM

## 2025-06-25 DIAGNOSIS — E11.9 TYPE 2 DIABETES MELLITUS WITHOUT COMPLICATION, WITHOUT LONG-TERM CURRENT USE OF INSULIN: ICD-10-CM

## 2025-06-25 DIAGNOSIS — I10 ESSENTIAL HYPERTENSION: ICD-10-CM

## 2025-06-25 DIAGNOSIS — Z86.79 HISTORY OF CONGESTIVE HEART FAILURE: ICD-10-CM

## 2025-06-25 DIAGNOSIS — S46.011S TRAUMATIC COMPLETE TEAR OF RIGHT ROTATOR CUFF, SEQUELA: ICD-10-CM

## 2025-06-25 DIAGNOSIS — Z01.818 PRE-OP EVALUATION: Primary | ICD-10-CM

## 2025-06-25 LAB
ANION GAP SERPL CALC-SCNC: 11 MMOL/L (ref 10–20)
BUN SERPL-MCNC: 15 MG/DL (ref 6–23)
CALCIUM SERPL-MCNC: 9.6 MG/DL (ref 8.6–10.3)
CHLORIDE SERPL-SCNC: 107 MMOL/L (ref 98–107)
CO2 SERPL-SCNC: 24 MMOL/L (ref 21–32)
CREAT SERPL-MCNC: 0.72 MG/DL (ref 0.5–1.3)
EGFRCR SERPLBLD CKD-EPI 2021: >90 ML/MIN/1.73M*2
ERYTHROCYTE [DISTWIDTH] IN BLOOD BY AUTOMATED COUNT: 13.6 % (ref 11.5–14.5)
GLUCOSE SERPL-MCNC: 105 MG/DL (ref 74–99)
HCT VFR BLD AUTO: 45.3 % (ref 41–52)
HGB BLD-MCNC: 15.8 G/DL (ref 13.5–17.5)
MCH RBC QN AUTO: 28.7 PG (ref 26–34)
MCHC RBC AUTO-ENTMCNC: 34.9 G/DL (ref 32–36)
MCV RBC AUTO: 82 FL (ref 80–100)
NRBC BLD-RTO: 0 /100 WBCS (ref 0–0)
PLATELET # BLD AUTO: 252 X10*3/UL (ref 150–450)
POTASSIUM SERPL-SCNC: 4 MMOL/L (ref 3.5–5.3)
RBC # BLD AUTO: 5.51 X10*6/UL (ref 4.5–5.9)
SODIUM SERPL-SCNC: 138 MMOL/L (ref 136–145)
WBC # BLD AUTO: 5.8 X10*3/UL (ref 4.4–11.3)

## 2025-06-25 PROCEDURE — 36415 COLL VENOUS BLD VENIPUNCTURE: CPT

## 2025-06-25 PROCEDURE — 99204 OFFICE O/P NEW MOD 45 MIN: CPT | Performed by: CLINICAL NURSE SPECIALIST

## 2025-06-25 PROCEDURE — 85027 COMPLETE CBC AUTOMATED: CPT

## 2025-06-25 PROCEDURE — 80048 BASIC METABOLIC PNL TOTAL CA: CPT

## 2025-06-25 RX ORDER — OMEPRAZOLE 20 MG/1
20 TABLET, DELAYED RELEASE ORAL
COMMUNITY

## 2025-06-25 RX ORDER — CHLORHEXIDINE GLUCONATE ORAL RINSE 1.2 MG/ML
SOLUTION DENTAL
Qty: 120 ML | Refills: 0 | Status: SHIPPED | OUTPATIENT
Start: 2025-06-25

## 2025-06-25 ASSESSMENT — DUKE ACTIVITY SCORE INDEX (DASI)
CAN YOU CLIMB A FLIGHT OF STAIRS OR WALK UP A HILL: YES
CAN YOU PARTICIPATE IN STRENOUS SPORTS LIKE SWIMMING, SINGLES TENNIS, FOOTBALL, BASKETBALL, OR SKIING: NO
CAN YOU RUN A SHORT DISTANCE: YES
CAN YOU TAKE CARE OF YOURSELF (EAT, DRESS, BATHE, OR USE TOILET): YES
CAN YOU DO YARD WORK LIKE RAKING LEAVES, WEEDING OR PUSHING A MOWER: NO
CAN YOU WALK A BLOCK OR TWO ON LEVEL GROUND: YES
CAN YOU DO HEAVY WORK AROUND THE HOUSE LIKE SCRUBBING FLOORS OR LIFTING AND MOVING HEAVY FURNITURE: NO
CAN YOU HAVE SEXUAL RELATIONS: YES
CAN YOU WALK INDOORS, SUCH AS AROUND YOUR HOUSE: YES
CAN YOU DO LIGHT WORK AROUND THE HOUSE LIKE DUSTING OR WASHING DISHES: YES
CAN YOU DO MODERATE WORK AROUND THE HOUSE LIKE VACUUMING, SWEEPING FLOORS OR CARRYING GROCERIES: YES

## 2025-06-25 ASSESSMENT — ENCOUNTER SYMPTOMS
HEMATOLOGIC/LYMPHATIC NEGATIVE: 1
EYES NEGATIVE: 1
MYALGIAS: 1
GASTROINTESTINAL NEGATIVE: 1
CONSTITUTIONAL NEGATIVE: 1
ALLERGIC/IMMUNOLOGIC NEGATIVE: 1
ENDOCRINE NEGATIVE: 1
ARTHRALGIAS: 1
NEUROLOGICAL NEGATIVE: 1
PSYCHIATRIC NEGATIVE: 1
RESPIRATORY NEGATIVE: 1
CARDIOVASCULAR NEGATIVE: 1

## 2025-06-25 ASSESSMENT — LIFESTYLE VARIABLES: SMOKING_STATUS: NONSMOKER

## 2025-06-25 NOTE — H&P
History Of Present Illness  Gen Solis II is a very pleasant 70 y.o. male presenting with hx of right shoulder pain, painful and decreased ROM. Scheduled for right reverse total shoulder replacement under general/block anesthesia per Dr. Hirsch on 7/8/25.      Past Medical History  Past Medical History:   Diagnosis Date    Anesthesia     anger, agitated , slighly violent    Anxiety     Class 3 severe obesity with body mass index (BMI) of 40.0 to 44.9 in adult 03/17/2025    Contusion of lung 10/07/2024    Fracture of multiple ribs 10/07/2024    Fracture of transverse process of lumbar vertebra (Multi) 10/07/2024    Hyperlipidemia     Hypertension     Low testosterone in male     Medicare annual wellness visit, subsequent 04/10/2025    Colonoscopy    Obesity     Sleep apnea     does juhi use any device    Type 2 diabetes mellitus without complication, without long-term current use of insulin 03/17/2025       Surgical History  Past Surgical History:   Procedure Laterality Date    APPENDECTOMY      CATARACT EXTRACTION Bilateral     CHOLECYSTECTOMY      KNEE SURGERY Right     meniscus repair    NASAL SEPTUM SURGERY      VASECTOMY          Social History  He reports that he quit smoking about 28 years ago. His smoking use included cigarettes. He has never been exposed to tobacco smoke. He has never used smokeless tobacco. He reports current alcohol use. He reports current drug use. Drug: Marijuana.    Family History  Family History   Problem Relation Name Age of Onset    Alzheimer's disease Mother      Esophageal cancer Father      Diabetes Sister      Heart attack Sister      Heart attack Paternal Grandfather      Diabetes Paternal Grandfather      Stroke Mother's Sister          Allergies  Bee venom protein (honey bee), Codeine, Penicillins, and Latex    Review of Systems   Constitutional: Negative.    HENT: Negative.     Eyes: Negative.    Respiratory: Negative.     Cardiovascular: Negative.    Gastrointestinal:  Negative.    Endocrine: Negative.    Genitourinary: Negative.    Musculoskeletal:  Positive for arthralgias and myalgias.        Right shoulder pain, OA. Painful and decreased ROM.   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 Currently right hand dominant.    Skin: Negative.    Allergic/Immunologic: Negative.    Neurological: Negative.    Hematological: Negative.    Psychiatric/Behavioral: Negative.     All other systems reviewed and are negative.       Physical Exam  Vitals and nursing note reviewed.   Constitutional:       Appearance: Normal appearance.   HENT:      Head: Normocephalic.      Nose: Nose normal.      Mouth/Throat:      Mouth: Mucous membranes are moist.      Pharynx: Oropharynx is clear.      Comments:   Mallampati: 3  TMD: >3  Finger breadth: 3  Dentition:  WNL  Neck ROM: full/thicker neck.   Eyes:      Pupils: Pupils are equal, round, and reactive to light.   Cardiovascular:      Rate and Rhythm: Normal rate and regular rhythm.      Heart sounds: Normal heart sounds, S1 normal and S2 normal.      Comments: Trace sharad lower leg edema at sock line, slightly tiago sharad lower ankles.   Pulmonary:      Effort: Pulmonary effort is normal.      Breath sounds: Normal breath sounds.      Comments: Clear throughout all fields.   Abdominal:      General: Bowel sounds are normal.      Palpations: Abdomen is soft.      Comments: Bowel sounds active x4 quads    Musculoskeletal:         General: Normal range of motion.      Cervical back: Normal range of motion and neck supple.      Right lower leg: Edema present.      Left lower leg: Edema present.      Comments: Right shoulder pain.    Skin:     General: Skin is warm and dry.   Neurological:      General: No focal deficit present.      Mental Status: He is alert and oriented to person, place, and time.   Psychiatric:         Mood and Affect: Mood normal.         Behavior: Behavior normal.         Thought Content: Thought content  "normal.         Judgment: Judgment normal.          Last Recorded Vitals  Blood pressure 131/78, pulse 72, temperature 36.1 °C (97 °F), temperature source Temporal, resp. rate 17, height 1.803 m (5' 11\"), weight 130 kg (286 lb), SpO2 96%.      DASI Risk Score      Flowsheet Row Pre-Admission Testing from 6/25/2025 in  [facility] Pre-Admission Testing from 10/3/2024 in [facility]   Can you take care of yourself (eat, dress, bathe, or use toilet)?  2.75 filed at 06/25/2025 1021 2.75 filed at 10/03/2024 0810   Can you walk indoors, such as around your house? 1.75 filed at 06/25/2025 1021 1.75 filed at 10/03/2024 0810   Can you walk a block or two on level ground?  2.75 filed at 06/25/2025 1021 2.75 filed at 10/03/2024 0810   Can you climb a flight of stairs or walk up a hill? 5.5 filed at 06/25/2025 1021 5.5 filed at 10/03/2024 0810   Can you run a short distance? 8 filed at 06/25/2025 1021 0 filed at 10/03/2024 0810   Can you do light work around the house like dusting or washing dishes? 2.7 filed at 06/25/2025 1021 2.7 filed at 10/03/2024 0810   Can you do moderate work around the house like vacuuming, sweeping floors or carrying groceries? 3.5 filed at 06/25/2025 1021 3.5 filed at 10/03/2024 0810   Can you do heavy work around the house like scrubbing floors or lifting and moving heavy furniture?  0 filed at 06/25/2025 1021 0 filed at 10/03/2024 0810   Can you do yard work like raking leaves, weeding or pushing a mower? 0 filed at 06/25/2025 1021 0 filed at 10/03/2024 0810   Can you have sexual relations? 5.25 filed at 06/25/2025 1021 5.25 filed at 10/03/2024 0810   Can you participate in moderate recreational activities like golf, bowling, dancing, doubles tennis or throwing a baseball or football? -- 0 filed at 10/03/2024 0810   Can you participate in strenous sports like swimming, singles tennis, football, basketball, or skiing? 0 filed at 06/25/2025 1021 0 filed at 10/03/2024 0810 "   DASI SCORE -- 24.2 filed at 10/03/2024 0810   METS Score (Will be calculated only when all the questions are answered) -- 5.7 filed at 10/03/2024 0810          Caprini DVT Assessment      Flowsheet Row Pre-Admission Testing from 6/25/2025 in  [facility] Pre-Admission Testing from 10/3/2024 in [facility]   DVT Score (IF A SCORE IS NOT CALCULATING, MUST SELECT A BMI TO COMPLETE) 7 filed at 06/25/2025 0940 7 filed at 10/03/2024 0850   Surgical Factors Major surgery planned, including arthroscopic and laproscopic (1-2 hours) filed at 06/25/2025 0940 Major surgery planned, including arthroscopic and laproscopic (1-2 hours) filed at 10/03/2024 0850   BMI (BMI MUST BE CHOSEN) 31-40 (Obesity) filed at 06/25/2025 0940 41-50 (Morbid obesity) filed at 10/03/2024 0850          Modified Frailty Index    No data to display       [id number] Stroke Risk Points  Current as of just now        N/A 0 to 9 Points:      Last Change: N/A          The NYE1OF4-ABKa risk score (Lip GH, et al. 2009. © 2010 [employer]) quantifies the risk of stroke for a patient with atrial fibrillation. For patients without atrial fibrillation or under the age of 18 this score appears as N/A. Higher score values generally indicate higher risk of stroke.        This score is not applicable to this patient. Components are not calculated.          Revised Cardiac Risk Index      Flowsheet Row Pre-Admission Testing from 6/25/2025 in  [facility] Pre-Admission Testing from 10/3/2024 in [facility]   High-Risk Surgery (Intraperitoneal, Intrathoracic,Suprainguinal vascular) 1 filed at 06/25/2025 0940 0 filed at 10/03/2024 0852   History of ischemic heart disease (History of MI, History of positive exercuse test, Current chest paint considered due to myocardial ischemia, Use of nitrate therapy, ECG with pathological Q Waves) 0 filed at 06/25/2025 0940 0 filed at 10/03/2024 0852    History of congestive heart failure (pulmonary edemia, bilateral rales or S3 gallop, Paroxysmal nocturnal dyspnea, CXR showing pulmonary vascular redistribution) 1 filed at 06/25/2025 0940 0 filed at 10/03/2024 0852   History of cerebrovascular disease (Prior TIA or stroke) 0 filed at 06/25/2025 0940 0 filed at 10/03/2024 0852   Pre-operative insulin treatment 0 filed at 06/25/2025 0940 0 filed at 10/03/2024 0852   Pre-operative creatinine>2 mg/dl 0 filed at 06/25/2025 0940 0 filed at 10/03/2024 0852   Revised Cardiac Risk Calculator 2 filed at 06/25/2025 0940 0 filed at 10/03/2024 0852          Apfel Simplified Score      Flowsheet Row Pre-Admission Testing from 6/25/2025 in  [facility] Pre-Admission Testing from 10/3/2024 in [facility]   Smoking status 1 filed at 06/25/2025 0940 0  [Occasional MJ] filed at 10/03/2024 0852   History of motion sickness or PONV  0 filed at 06/25/2025 0940 0 filed at 10/03/2024 0852   Use of postoperative opioids 1 filed at 06/25/2025 0940 1 filed at 10/03/2024 0852   Gender - Female 0=No filed at 06/25/2025 0940 0=No filed at 10/03/2024 0852   Apfel Simplified Score Calculator 2 filed at 06/25/2025 0940 1 filed at 10/03/2024 0852          Risk Analysis Index Results This Encounter    No data found in the last 10 encounters.       Stop Bang Score      Flowsheet Row Pre-Admission Testing from 6/25/2025 in  [facility] Pre-Admission Testing from 10/3/2024 in [facility]   Do you snore loudly? 0 filed at 06/25/2025 1019 1 filed at 10/03/2024 0809   Do you often feel tired or fatigued after your sleep? 0 filed at 06/25/2025 1019 1 filed at 10/03/2024 0809   Has anyone ever observed you stop breathing in your sleep? 0 filed at 06/25/2025 1019 0 filed at 10/03/2024 0809   Do you have or are you being treated for high blood pressure? 1 filed at 06/25/2025 1019 1 filed at 10/03/2024 0809   Recent BMI (Calculated) 39.8 filed at  06/25/2025 1019 47 filed at 10/03/2024 0809   Is BMI greater than 35 kg/m2? 1=Yes filed at 06/25/2025 1019 1=Yes filed at 10/03/2024 0809   Age older than 50 years old? 1=Yes filed at 06/25/2025 1019 1=Yes filed at 10/03/2024 0809   Is your neck circumference greater than 17 inches (Male) or 16 inches (Female)? 0 filed at 06/25/2025 1019 0 filed at 10/03/2024 0809   Gender - Male 1=Yes filed at 06/25/2025 1019 1=Yes filed at 10/03/2024 0809   STOP-BANG Total Score 4 filed at 06/25/2025 1019 6 filed at 10/03/2024 0809          Prodigy: High Risk  Total Score: 23              Prodigy Age Score      Prodigy Gender Score     Prodigy CHF score          ARISCAT Score for Postoperative Pulmonary Complications      Flowsheet Row Pre-Admission Testing from 6/25/2025 in  [facility]   Age Calculated Score 3 filed at 06/25/2025 0941   Preoperative SpO2 0 filed at 06/25/2025 0941   Respiratory infection in the last month Either upper or lower (i.e., URI, bronchitis, pneumonia), with fever and antibiotic treatment 0 filed at 06/25/2025 0941   Preoperative anemia (Hgb less than 10 g/dl) 0 filed at 06/25/2025 0941   Surgical incision  0 filed at 06/25/2025 0941   Duration of surgery  0 filed at 06/25/2025 0941   Emergency Procedure  0 filed at 06/25/2025 0941   ARISCAT Total Score  3 filed at 06/25/2025 0941          Nabeel Perioperative Risk for Myocardial Infarction or Cardiac Arrest (GABRIELA)    No data to display       Relevant Results  Results for orders placed or performed in visit on 06/25/25 (from the past 24 hours)   Basic Metabolic Panel   Result Value Ref Range    Glucose 105 (H) 74 - 99 mg/dL    Sodium 138 136 - 145 mmol/L    Potassium 4.0 3.5 - 5.3 mmol/L    Chloride 107 98 - 107 mmol/L    Bicarbonate 24 21 - 32 mmol/L    Anion Gap 11 10 - 20 mmol/L    Urea Nitrogen 15 6 - 23 mg/dL    Creatinine 0.72 0.50 - 1.30 mg/dL    eGFR >90 >60 mL/min/1.73m*2    Calcium 9.6 8.6 - 10.3 mg/dL   CBC   Result Value Ref  Range    WBC 5.8 4.4 - 11.3 x10*3/uL    nRBC 0.0 0.0 - 0.0 /100 WBCs    RBC 5.51 4.50 - 5.90 x10*6/uL    Hemoglobin 15.8 13.5 - 17.5 g/dL    Hematocrit 45.3 41.0 - 52.0 %    MCV 82 80 - 100 fL    MCH 28.7 26.0 - 34.0 pg    MCHC 34.9 32.0 - 36.0 g/dL    RDW 13.6 11.5 - 14.5 %    Platelets 252 150 - 450 x10*3/uL               Problem List Items Addressed This Visit       Essential hypertension    Relevant Orders    Basic Metabolic Panel    CBC (Completed)    ECG 12 Lead    Type 2 diabetes mellitus without complication, without long-term current use of insulin    Relevant Orders    Basic Metabolic Panel    CBC (Completed)    ECG 12 Lead    Complete tear of right rotator cuff     Other Visit Diagnoses         Pre-op evaluation    -  Primary    Relevant Medications    chlorhexidine (Peridex) 0.12 % solution    Other Relevant Orders    Basic Metabolic Panel    CBC (Completed)    ECG 12 Lead      History of congestive heart failure        Relevant Orders    ECG 12 Lead          Hx of right shoulder pain, painful and decreased ROM. Scheduled for right reverse total shoulder replacement under general/block anesthesia per Dr. Hirsch on 7/8/25.   CBC, BMP ordered. Reviewed and these are WNL and acceptable for upcoming surgery.   Hx of HTN, DM, HF. EKG ordered. Shows SR. Rate of 66 bpm.   Last echo was 2024 showing 65% EF.  Cardiac scoring completed last month 5/2025, shows elevated score of 245. PCP aware, and they may increase statin dose or change statin to a higher intensity drug at next visit.   Hx of JAMIL. Does not wear CPAP.   Last A1c was elevated @ 7.2% 4/2025.   Last dose of Monjaro 6/19/25.   H&P and airway assessment completed today.  Patient requested prescription for Peridex. Sent script to [facility] in [place].   Provided patient with Hibiclens soap at today's visit. Instructed on use, verbalized understanding.   All surgery instructions reviewed with patient by RN. Verbalized understanding.    Encouraged early ambulation, DVT/ PE prevention, constipation prevention, and C&DB post operatively. Patient verbalized understanding.         Crystal Gaspar, APRN-CNS

## 2025-06-25 NOTE — PREPROCEDURE INSTRUCTIONS
Medication List            Accurate as of June 25, 2025 10:22 AM. Always use your most recent med list.                amLODIPine 10 mg tablet  Commonly known as: Norvasc  Take 1 tablet (10 mg) by mouth once daily. Do not fill before October 10, 2024.     chlorhexidine 0.12 % solution  Commonly known as: Peridex  Use 15 mL in the mouth or throat if needed (pre operative 15ml swish and spit the night befor and morning of surgery) for up to 2 doses.     escitalopram 10 mg tablet  Commonly known as: Lexapro  Take 1 tablet (10 mg) by mouth once daily.     furosemide 40 mg tablet  Commonly known as: Lasix  Take 1 tablet (40 mg) by mouth once daily.     Mounjaro 7.5 mg/0.5 mL pen injector  Generic drug: tirzepatide  Inject 7.5 mg under the skin 1 (one) time per week.     multivitamin with minerals iron-free  Commonly known as: Centrum Silver     olmesartan 40 mg tablet  Commonly known as: BENIcar  Take 1 tablet (40 mg) by mouth once daily.     omeprazole OTC 20 mg EC tablet  Commonly known as: PriLOSEC OTC     simvastatin 20 mg tablet  Commonly known as: Zocor  Take 1 tablet (20 mg) by mouth once daily.     testosterone 20.25 mg/1.25 gram (1.62 %) gel in metered-dose pump  use 2 pumps EVERY DAY AS DIRECTED     TylenoL 325 mg tablet  Generic drug: acetaminophen              Stopped Mounjaro on 6/19.  Stopped supplements one week prior.      NPO Instructions:     Do not drink any liquid after midnight the night before your surgery  Do not eat any food after midnight the night before your surgery/procedure.  Candy, gum, mints and smoking of cigarettes, marijuana or vaping is not permitted after midnight prior to your surgery   Do not drink Alcohol 24 hours prior to surgery      Increase fluid intake day before surgery    Additional Instructions:      Review your medication instructions, take indicated medications    Wear  comfortable loose fitting clothing  Do not use moisturizers, creams, lotions or perfume  All jewelry  and valuables should be left at home. May bring glasses and partials.    Stop Ibuprofen and Aspirin products 7 days prior to surgery.     Shower or bathe the night before or day of surgery.   Brush teeth and avoid perfumes, colognes, powders, makeup, aftershave and hair spray    Go to Registration, in the main lobby, upon arrival on the day of surgery and have 's license and medical insurance card available.    Call [phone] the day before your surgery/procedure to find out what time you are to arrive the next day.     Please have a responsible adult to drive you home and be available to help you as needed after surgery.   Cannot use public transportation or an insurance service for your ride home.

## 2025-06-30 ENCOUNTER — TELEPHONE (OUTPATIENT)
Dept: ORTHOPEDIC SURGERY | Facility: CLINIC | Age: 70
End: 2025-06-30
Payer: MEDICARE

## 2025-07-02 DIAGNOSIS — Z96.611 HISTORY OF RIGHT SHOULDER REPLACEMENT: ICD-10-CM

## 2025-07-03 ENCOUNTER — ANESTHESIA EVENT (OUTPATIENT)
Dept: OPERATING ROOM | Facility: HOSPITAL | Age: 70
End: 2025-07-03
Payer: MEDICARE

## 2025-07-07 ENCOUNTER — TELEPHONE (OUTPATIENT)
Dept: ORTHOPEDIC SURGERY | Facility: CLINIC | Age: 70
End: 2025-07-07
Payer: MEDICARE

## 2025-07-07 NOTE — TELEPHONE ENCOUNTER
Patient is getting extremely anxious about surgery tomorrow morning.  Can he take anything for anxiety.    Pharmacy [facility] [place].  Please call patient.

## 2025-07-08 ENCOUNTER — HOSPITAL ENCOUNTER (OUTPATIENT)
Facility: HOSPITAL | Age: 70
Discharge: HOME | End: 2025-07-09
Attending: ORTHOPAEDIC SURGERY | Admitting: ORTHOPAEDIC SURGERY
Payer: MEDICARE

## 2025-07-08 ENCOUNTER — PHARMACY VISIT (OUTPATIENT)
Dept: PHARMACY | Facility: CLINIC | Age: 70
End: 2025-07-08
Payer: COMMERCIAL

## 2025-07-08 ENCOUNTER — ANESTHESIA (OUTPATIENT)
Dept: OPERATING ROOM | Facility: HOSPITAL | Age: 70
End: 2025-07-08
Payer: MEDICARE

## 2025-07-08 ENCOUNTER — APPOINTMENT (OUTPATIENT)
Dept: RADIOLOGY | Facility: HOSPITAL | Age: 70
End: 2025-07-08
Payer: MEDICARE

## 2025-07-08 DIAGNOSIS — Z96.611 STATUS POST REVERSE TOTAL SHOULDER REPLACEMENT, RIGHT: Primary | ICD-10-CM

## 2025-07-08 DIAGNOSIS — M19.011 ARTHRITIS OF RIGHT SHOULDER: ICD-10-CM

## 2025-07-08 DIAGNOSIS — M75.21 BICEPS TENDINITIS OF RIGHT SHOULDER: ICD-10-CM

## 2025-07-08 DIAGNOSIS — M25.511 RIGHT SHOULDER PAIN, UNSPECIFIED CHRONICITY: ICD-10-CM

## 2025-07-08 DIAGNOSIS — M19.011 ARTHRITIS OF RIGHT SHOULDER REGION: ICD-10-CM

## 2025-07-08 DIAGNOSIS — M75.121 COMPLETE TEAR OF RIGHT ROTATOR CUFF, UNSPECIFIED WHETHER TRAUMATIC: ICD-10-CM

## 2025-07-08 LAB
GLUCOSE BLD MANUAL STRIP-MCNC: 123 MG/DL (ref 74–99)
GLUCOSE BLD MANUAL STRIP-MCNC: 135 MG/DL (ref 74–99)
GLUCOSE BLD MANUAL STRIP-MCNC: 136 MG/DL (ref 74–99)
GLUCOSE BLD MANUAL STRIP-MCNC: 172 MG/DL (ref 74–99)

## 2025-07-08 PROCEDURE — 2500000002 HC RX 250 W HCPCS SELF ADMINISTERED DRUGS (ALT 637 FOR MEDICARE OP, ALT 636 FOR OP/ED)

## 2025-07-08 PROCEDURE — C1713 ANCHOR/SCREW BN/BN,TIS/BN: HCPCS | Performed by: ORTHOPAEDIC SURGERY

## 2025-07-08 PROCEDURE — 2500000004 HC RX 250 GENERAL PHARMACY W/ HCPCS (ALT 636 FOR OP/ED)

## 2025-07-08 PROCEDURE — C1776 JOINT DEVICE (IMPLANTABLE): HCPCS | Performed by: ORTHOPAEDIC SURGERY

## 2025-07-08 PROCEDURE — 2500000002 HC RX 250 W HCPCS SELF ADMINISTERED DRUGS (ALT 637 FOR MEDICARE OP, ALT 636 FOR OP/ED): Performed by: STUDENT IN AN ORGANIZED HEALTH CARE EDUCATION/TRAINING PROGRAM

## 2025-07-08 PROCEDURE — A6213 FOAM DRG >16<=48 SQ IN W/BDR: HCPCS | Performed by: ORTHOPAEDIC SURGERY

## 2025-07-08 PROCEDURE — 2500000004 HC RX 250 GENERAL PHARMACY W/ HCPCS (ALT 636 FOR OP/ED): Performed by: NURSE ANESTHETIST, CERTIFIED REGISTERED

## 2025-07-08 PROCEDURE — 7100000002 HC RECOVERY ROOM TIME - EACH INCREMENTAL 1 MINUTE: Performed by: ORTHOPAEDIC SURGERY

## 2025-07-08 PROCEDURE — 2780000003 HC OR 278 NO HCPCS: Performed by: ORTHOPAEDIC SURGERY

## 2025-07-08 PROCEDURE — 96372 THER/PROPH/DIAG INJ SC/IM: CPT | Performed by: NURSE ANESTHETIST, CERTIFIED REGISTERED

## 2025-07-08 PROCEDURE — RXMED WILLOW AMBULATORY MEDICATION CHARGE

## 2025-07-08 PROCEDURE — 3700000002 HC GENERAL ANESTHESIA TIME - EACH INCREMENTAL 1 MINUTE: Performed by: ORTHOPAEDIC SURGERY

## 2025-07-08 PROCEDURE — 3600000010 HC OR TIME - EACH INCREMENTAL 1 MINUTE - PROCEDURE LEVEL FIVE: Performed by: ORTHOPAEDIC SURGERY

## 2025-07-08 PROCEDURE — 99222 1ST HOSP IP/OBS MODERATE 55: CPT | Performed by: STUDENT IN AN ORGANIZED HEALTH CARE EDUCATION/TRAINING PROGRAM

## 2025-07-08 PROCEDURE — 2500000001 HC RX 250 WO HCPCS SELF ADMINISTERED DRUGS (ALT 637 FOR MEDICARE OP)

## 2025-07-08 PROCEDURE — 7100000011 HC EXTENDED STAY RECOVERY HOURLY - NURSING UNIT

## 2025-07-08 PROCEDURE — 7100000001 HC RECOVERY ROOM TIME - INITIAL BASE CHARGE: Performed by: ORTHOPAEDIC SURGERY

## 2025-07-08 PROCEDURE — 23472 RECONSTRUCT SHOULDER JOINT: CPT | Performed by: ORTHOPAEDIC SURGERY

## 2025-07-08 PROCEDURE — 82947 ASSAY GLUCOSE BLOOD QUANT: CPT

## 2025-07-08 PROCEDURE — 2500000004 HC RX 250 GENERAL PHARMACY W/ HCPCS (ALT 636 FOR OP/ED): Performed by: ANESTHESIOLOGY

## 2025-07-08 PROCEDURE — A4649 SURGICAL SUPPLIES: HCPCS | Performed by: ORTHOPAEDIC SURGERY

## 2025-07-08 PROCEDURE — 2500000005 HC RX 250 GENERAL PHARMACY W/O HCPCS: Performed by: NURSE ANESTHETIST, CERTIFIED REGISTERED

## 2025-07-08 PROCEDURE — 2720000007 HC OR 272 NO HCPCS: Performed by: ORTHOPAEDIC SURGERY

## 2025-07-08 PROCEDURE — 3700000001 HC GENERAL ANESTHESIA TIME - INITIAL BASE CHARGE: Performed by: ORTHOPAEDIC SURGERY

## 2025-07-08 PROCEDURE — 73030 X-RAY EXAM OF SHOULDER: CPT | Mod: RT

## 2025-07-08 PROCEDURE — 23430 REPAIR BICEPS TENDON: CPT | Performed by: ORTHOPAEDIC SURGERY

## 2025-07-08 PROCEDURE — 3600000005 HC OR TIME - INITIAL BASE CHARGE - PROCEDURE LEVEL FIVE: Performed by: ORTHOPAEDIC SURGERY

## 2025-07-08 PROCEDURE — 2500000004 HC RX 250 GENERAL PHARMACY W/ HCPCS (ALT 636 FOR OP/ED): Performed by: ORTHOPAEDIC SURGERY

## 2025-07-08 DEVICE — IMPLANTABLE DEVICE
Type: IMPLANTABLE DEVICE | Site: SHOULDER | Status: FUNCTIONAL
Brand: COMPREHENSIVE® SHOULDER SYSTEM

## 2025-07-08 DEVICE — IMPLANTABLE DEVICE
Type: IMPLANTABLE DEVICE | Site: SHOULDER | Status: FUNCTIONAL
Brand: COMPREHENSIVE REVERSE SHOULDER

## 2025-07-08 DEVICE — GLENOSPHERE, VERSA-DIAL, 36MM, STANDARD: Type: IMPLANTABLE DEVICE | Site: SHOULDER | Status: FUNCTIONAL

## 2025-07-08 DEVICE — IMPLANTABLE DEVICE
Type: IMPLANTABLE DEVICE | Site: SHOULDER | Status: FUNCTIONAL
Brand: COMPREHENSIVE® PROLONG®

## 2025-07-08 DEVICE — IMPLANTABLE DEVICE
Type: IMPLANTABLE DEVICE | Site: SHOULDER | Status: FUNCTIONAL
Brand: COMPREHENSIVE®

## 2025-07-08 DEVICE — IMPLANTABLE DEVICE
Type: IMPLANTABLE DEVICE | Site: SHOULDER | Status: FUNCTIONAL
Brand: COMPREHENSIVE® REVERSE SHOULDER

## 2025-07-08 RX ORDER — SODIUM CHLORIDE, SODIUM LACTATE, POTASSIUM CHLORIDE, CALCIUM CHLORIDE 600; 310; 30; 20 MG/100ML; MG/100ML; MG/100ML; MG/100ML
75 INJECTION, SOLUTION INTRAVENOUS CONTINUOUS
Status: DISCONTINUED | OUTPATIENT
Start: 2025-07-08 | End: 2025-07-08

## 2025-07-08 RX ORDER — SIMVASTATIN 20 MG/1
20 TABLET, FILM COATED ORAL NIGHTLY
Status: DISCONTINUED | OUTPATIENT
Start: 2025-07-08 | End: 2025-07-09 | Stop reason: HOSPADM

## 2025-07-08 RX ORDER — ONDANSETRON HYDROCHLORIDE 2 MG/ML
INJECTION, SOLUTION INTRAVENOUS AS NEEDED
Status: DISCONTINUED | OUTPATIENT
Start: 2025-07-08 | End: 2025-07-08

## 2025-07-08 RX ORDER — PROPOFOL 10 MG/ML
INJECTION, EMULSION INTRAVENOUS AS NEEDED
Status: DISCONTINUED | OUTPATIENT
Start: 2025-07-08 | End: 2025-07-08

## 2025-07-08 RX ORDER — DEXTROSE 50 % IN WATER (D50W) INTRAVENOUS SYRINGE
25
Status: DISCONTINUED | OUTPATIENT
Start: 2025-07-08 | End: 2025-07-09 | Stop reason: HOSPADM

## 2025-07-08 RX ORDER — IBUPROFEN 600 MG/1
600 TABLET, FILM COATED ORAL 3 TIMES DAILY
Status: DISCONTINUED | OUTPATIENT
Start: 2025-07-08 | End: 2025-07-09 | Stop reason: HOSPADM

## 2025-07-08 RX ORDER — DOXYCYCLINE 100 MG/1
100 CAPSULE ORAL EVERY 12 HOURS SCHEDULED
Status: DISCONTINUED | OUTPATIENT
Start: 2025-07-08 | End: 2025-07-09 | Stop reason: HOSPADM

## 2025-07-08 RX ORDER — OXYCODONE HYDROCHLORIDE 5 MG/1
5 TABLET ORAL EVERY 6 HOURS PRN
Qty: 28 TABLET | Refills: 0 | Status: SHIPPED | OUTPATIENT
Start: 2025-07-08

## 2025-07-08 RX ORDER — DIPHENHYDRAMINE HYDROCHLORIDE 50 MG/ML
12.5 INJECTION, SOLUTION INTRAMUSCULAR; INTRAVENOUS ONCE AS NEEDED
Status: DISCONTINUED | OUTPATIENT
Start: 2025-07-08 | End: 2025-07-08 | Stop reason: HOSPADM

## 2025-07-08 RX ORDER — OXYCODONE HYDROCHLORIDE 10 MG/1
10 TABLET ORAL EVERY 4 HOURS PRN
Status: DISCONTINUED | OUTPATIENT
Start: 2025-07-08 | End: 2025-07-09 | Stop reason: HOSPADM

## 2025-07-08 RX ORDER — ACETAMINOPHEN 325 MG/1
650 TABLET ORAL EVERY 6 HOURS SCHEDULED
Status: DISCONTINUED | OUTPATIENT
Start: 2025-07-08 | End: 2025-07-09 | Stop reason: HOSPADM

## 2025-07-08 RX ORDER — GABAPENTIN 300 MG/1
300 CAPSULE ORAL NIGHTLY
Qty: 5 CAPSULE | Refills: 0 | Status: SHIPPED | OUTPATIENT
Start: 2025-07-08 | End: 2025-07-13

## 2025-07-08 RX ORDER — MIDAZOLAM HYDROCHLORIDE 1 MG/ML
INJECTION, SOLUTION INTRAMUSCULAR; INTRAVENOUS AS NEEDED
Status: DISCONTINUED | OUTPATIENT
Start: 2025-07-08 | End: 2025-07-08

## 2025-07-08 RX ORDER — LABETALOL HYDROCHLORIDE 5 MG/ML
5 INJECTION, SOLUTION INTRAVENOUS ONCE AS NEEDED
Status: DISCONTINUED | OUTPATIENT
Start: 2025-07-08 | End: 2025-07-08 | Stop reason: HOSPADM

## 2025-07-08 RX ORDER — LIDOCAINE HYDROCHLORIDE AND EPINEPHRINE 10; 10 UG/ML; MG/ML
INJECTION, SOLUTION INFILTRATION; PERINEURAL AS NEEDED
Status: DISCONTINUED | OUTPATIENT
Start: 2025-07-08 | End: 2025-07-08

## 2025-07-08 RX ORDER — NALOXONE HYDROCHLORIDE 0.4 MG/ML
0.2 INJECTION, SOLUTION INTRAMUSCULAR; INTRAVENOUS; SUBCUTANEOUS EVERY 5 MIN PRN
Status: DISCONTINUED | OUTPATIENT
Start: 2025-07-08 | End: 2025-07-09 | Stop reason: HOSPADM

## 2025-07-08 RX ORDER — ROPIVACAINE HYDROCHLORIDE 5 MG/ML
INJECTION, SOLUTION EPIDURAL; INFILTRATION; PERINEURAL AS NEEDED
Status: DISCONTINUED | OUTPATIENT
Start: 2025-07-08 | End: 2025-07-08

## 2025-07-08 RX ORDER — MEPERIDINE HYDROCHLORIDE 25 MG/ML
12.5 INJECTION INTRAMUSCULAR; INTRAVENOUS; SUBCUTANEOUS EVERY 10 MIN PRN
Status: DISCONTINUED | OUTPATIENT
Start: 2025-07-08 | End: 2025-07-08 | Stop reason: HOSPADM

## 2025-07-08 RX ORDER — CEFAZOLIN SODIUM 2 G/50ML
2 SOLUTION INTRAVENOUS EVERY 8 HOURS
Status: COMPLETED | OUTPATIENT
Start: 2025-07-08 | End: 2025-07-09

## 2025-07-08 RX ORDER — ESCITALOPRAM OXALATE 10 MG/1
10 TABLET ORAL DAILY
Status: DISCONTINUED | OUTPATIENT
Start: 2025-07-08 | End: 2025-07-09 | Stop reason: HOSPADM

## 2025-07-08 RX ORDER — MORPHINE SULFATE 2 MG/ML
2 INJECTION, SOLUTION INTRAMUSCULAR; INTRAVENOUS EVERY 5 MIN PRN
Status: DISCONTINUED | OUTPATIENT
Start: 2025-07-08 | End: 2025-07-08 | Stop reason: HOSPADM

## 2025-07-08 RX ORDER — TALC
3 POWDER (GRAM) TOPICAL NIGHTLY PRN
Status: DISCONTINUED | OUTPATIENT
Start: 2025-07-08 | End: 2025-07-09 | Stop reason: HOSPADM

## 2025-07-08 RX ORDER — ROCURONIUM BROMIDE 10 MG/ML
INJECTION, SOLUTION INTRAVENOUS AS NEEDED
Status: DISCONTINUED | OUTPATIENT
Start: 2025-07-08 | End: 2025-07-08

## 2025-07-08 RX ORDER — ONDANSETRON HYDROCHLORIDE 2 MG/ML
4 INJECTION, SOLUTION INTRAVENOUS EVERY 8 HOURS PRN
Status: DISCONTINUED | OUTPATIENT
Start: 2025-07-08 | End: 2025-07-09 | Stop reason: HOSPADM

## 2025-07-08 RX ORDER — HYDRALAZINE HYDROCHLORIDE 20 MG/ML
5 INJECTION INTRAMUSCULAR; INTRAVENOUS EVERY 30 MIN PRN
Status: DISCONTINUED | OUTPATIENT
Start: 2025-07-08 | End: 2025-07-08 | Stop reason: HOSPADM

## 2025-07-08 RX ORDER — ASPIRIN 325 MG
325 TABLET, DELAYED RELEASE (ENTERIC COATED) ORAL 2 TIMES DAILY
Qty: 60 TABLET | Refills: 0 | Status: SHIPPED | OUTPATIENT
Start: 2025-07-08 | End: 2025-08-07

## 2025-07-08 RX ORDER — CYCLOBENZAPRINE HCL 10 MG
10 TABLET ORAL 3 TIMES DAILY PRN
Status: DISCONTINUED | OUTPATIENT
Start: 2025-07-08 | End: 2025-07-09 | Stop reason: HOSPADM

## 2025-07-08 RX ORDER — ASPIRIN 81 MG/1
81 TABLET ORAL 2 TIMES DAILY
Status: DISCONTINUED | OUTPATIENT
Start: 2025-07-08 | End: 2025-07-09 | Stop reason: HOSPADM

## 2025-07-08 RX ORDER — VALSARTAN 320 MG/1
320 TABLET ORAL DAILY
Status: DISCONTINUED | OUTPATIENT
Start: 2025-07-08 | End: 2025-07-09 | Stop reason: HOSPADM

## 2025-07-08 RX ORDER — ACETAMINOPHEN 325 MG/1
650 TABLET ORAL EVERY 4 HOURS PRN
Status: DISCONTINUED | OUTPATIENT
Start: 2025-07-08 | End: 2025-07-09 | Stop reason: HOSPADM

## 2025-07-08 RX ORDER — KETOROLAC TROMETHAMINE 30 MG/ML
INJECTION, SOLUTION INTRAMUSCULAR; INTRAVENOUS AS NEEDED
Status: DISCONTINUED | OUTPATIENT
Start: 2025-07-08 | End: 2025-07-08

## 2025-07-08 RX ORDER — ONDANSETRON 4 MG/1
4 TABLET, FILM COATED ORAL EVERY 8 HOURS PRN
Status: DISCONTINUED | OUTPATIENT
Start: 2025-07-08 | End: 2025-07-09 | Stop reason: HOSPADM

## 2025-07-08 RX ORDER — OXYCODONE HYDROCHLORIDE 5 MG/1
5 TABLET ORAL EVERY 6 HOURS PRN
Status: DISCONTINUED | OUTPATIENT
Start: 2025-07-08 | End: 2025-07-09 | Stop reason: HOSPADM

## 2025-07-08 RX ORDER — ALBUTEROL SULFATE 0.83 MG/ML
2.5 SOLUTION RESPIRATORY (INHALATION) ONCE AS NEEDED
Status: DISCONTINUED | OUTPATIENT
Start: 2025-07-08 | End: 2025-07-08 | Stop reason: HOSPADM

## 2025-07-08 RX ORDER — ONDANSETRON HYDROCHLORIDE 2 MG/ML
4 INJECTION, SOLUTION INTRAVENOUS ONCE AS NEEDED
Status: DISCONTINUED | OUTPATIENT
Start: 2025-07-08 | End: 2025-07-08 | Stop reason: HOSPADM

## 2025-07-08 RX ORDER — LIDOCAINE HYDROCHLORIDE 20 MG/ML
INJECTION, SOLUTION INFILTRATION; PERINEURAL AS NEEDED
Status: DISCONTINUED | OUTPATIENT
Start: 2025-07-08 | End: 2025-07-08

## 2025-07-08 RX ORDER — CEFAZOLIN SODIUM 3 G/150ML
3 INJECTION, SOLUTION INTRAVENOUS ONCE
Status: COMPLETED | OUTPATIENT
Start: 2025-07-08 | End: 2025-07-08

## 2025-07-08 RX ORDER — FENTANYL CITRATE 50 UG/ML
INJECTION, SOLUTION INTRAMUSCULAR; INTRAVENOUS AS NEEDED
Status: DISCONTINUED | OUTPATIENT
Start: 2025-07-08 | End: 2025-07-08

## 2025-07-08 RX ORDER — ACETAMINOPHEN 500 MG
1000 TABLET ORAL EVERY 8 HOURS PRN
Qty: 90 TABLET | Refills: 0 | Status: SHIPPED | OUTPATIENT
Start: 2025-07-08

## 2025-07-08 RX ORDER — DOXYCYCLINE 100 MG/1
100 CAPSULE ORAL 2 TIMES DAILY
Qty: 10 CAPSULE | Refills: 0 | Status: SHIPPED | OUTPATIENT
Start: 2025-07-08 | End: 2025-07-13

## 2025-07-08 RX ORDER — DOCUSATE SODIUM 100 MG/1
100 CAPSULE, LIQUID FILLED ORAL 2 TIMES DAILY
Status: DISCONTINUED | OUTPATIENT
Start: 2025-07-08 | End: 2025-07-09 | Stop reason: HOSPADM

## 2025-07-08 RX ORDER — TRANEXAMIC ACID 1 G/10ML
INJECTION, SOLUTION INTRAVENOUS AS NEEDED
Status: DISCONTINUED | OUTPATIENT
Start: 2025-07-08 | End: 2025-07-08

## 2025-07-08 RX ORDER — INSULIN LISPRO 100 [IU]/ML
0-10 INJECTION, SOLUTION INTRAVENOUS; SUBCUTANEOUS
Status: DISCONTINUED | OUTPATIENT
Start: 2025-07-08 | End: 2025-07-09 | Stop reason: HOSPADM

## 2025-07-08 RX ORDER — FUROSEMIDE 40 MG/1
40 TABLET ORAL DAILY
Status: DISCONTINUED | OUTPATIENT
Start: 2025-07-08 | End: 2025-07-09 | Stop reason: HOSPADM

## 2025-07-08 RX ORDER — MELOXICAM 15 MG/1
15 TABLET ORAL DAILY PRN
Qty: 30 TABLET | Refills: 0 | Status: SHIPPED | OUTPATIENT
Start: 2025-07-08 | End: 2025-08-07

## 2025-07-08 RX ORDER — FAMOTIDINE 10 MG/ML
20 INJECTION, SOLUTION INTRAVENOUS ONCE
Status: COMPLETED | OUTPATIENT
Start: 2025-07-08 | End: 2025-07-08

## 2025-07-08 RX ORDER — DEXTROSE 50 % IN WATER (D50W) INTRAVENOUS SYRINGE
12.5
Status: DISCONTINUED | OUTPATIENT
Start: 2025-07-08 | End: 2025-07-09 | Stop reason: HOSPADM

## 2025-07-08 RX ORDER — DOCUSATE SODIUM 100 MG/1
100 CAPSULE, LIQUID FILLED ORAL 2 TIMES DAILY PRN
Qty: 28 CAPSULE | Refills: 0 | Status: SHIPPED | OUTPATIENT
Start: 2025-07-08 | End: 2025-07-22

## 2025-07-08 RX ORDER — OXYCODONE AND ACETAMINOPHEN 5; 325 MG/1; MG/1
1 TABLET ORAL EVERY 4 HOURS PRN
Status: DISCONTINUED | OUTPATIENT
Start: 2025-07-08 | End: 2025-07-08 | Stop reason: HOSPADM

## 2025-07-08 RX ORDER — PHENYLEPHRINE HCL IN 0.9% NACL 1 MG/10 ML
SYRINGE (ML) INTRAVENOUS AS NEEDED
Status: DISCONTINUED | OUTPATIENT
Start: 2025-07-08 | End: 2025-07-08

## 2025-07-08 RX ORDER — LIDOCAINE HYDROCHLORIDE 10 MG/ML
0.1 INJECTION, SOLUTION EPIDURAL; INFILTRATION; INTRACAUDAL; PERINEURAL ONCE
Status: DISCONTINUED | OUTPATIENT
Start: 2025-07-08 | End: 2025-07-08 | Stop reason: HOSPADM

## 2025-07-08 RX ORDER — VANCOMYCIN HYDROCHLORIDE 1 G/20ML
INJECTION, POWDER, LYOPHILIZED, FOR SOLUTION INTRAVENOUS AS NEEDED
Status: DISCONTINUED | OUTPATIENT
Start: 2025-07-08 | End: 2025-07-08 | Stop reason: HOSPADM

## 2025-07-08 RX ORDER — DROPERIDOL 2.5 MG/ML
0.62 INJECTION, SOLUTION INTRAMUSCULAR; INTRAVENOUS ONCE AS NEEDED
Status: DISCONTINUED | OUTPATIENT
Start: 2025-07-08 | End: 2025-07-08 | Stop reason: HOSPADM

## 2025-07-08 RX ORDER — SODIUM CHLORIDE, SODIUM LACTATE, POTASSIUM CHLORIDE, CALCIUM CHLORIDE 600; 310; 30; 20 MG/100ML; MG/100ML; MG/100ML; MG/100ML
100 INJECTION, SOLUTION INTRAVENOUS CONTINUOUS
Status: DISCONTINUED | OUTPATIENT
Start: 2025-07-08 | End: 2025-07-08 | Stop reason: HOSPADM

## 2025-07-08 RX ORDER — NORETHINDRONE AND ETHINYL ESTRADIOL 0.5-0.035
KIT ORAL AS NEEDED
Status: DISCONTINUED | OUTPATIENT
Start: 2025-07-08 | End: 2025-07-08

## 2025-07-08 RX ADMIN — ROCURONIUM BROMIDE 50 MG: 10 INJECTION, SOLUTION INTRAVENOUS at 10:42

## 2025-07-08 RX ADMIN — ROPIVACAINE HYDROCHLORIDE 20 ML: 5 INJECTION, SOLUTION EPIDURAL; INFILTRATION; PERINEURAL at 09:38

## 2025-07-08 RX ADMIN — EPHEDRINE SULFATE 15 MG: 50 INJECTION, SOLUTION INTRAVENOUS at 11:47

## 2025-07-08 RX ADMIN — EPHEDRINE SULFATE 10 MG: 50 INJECTION, SOLUTION INTRAVENOUS at 11:37

## 2025-07-08 RX ADMIN — DOCUSATE SODIUM 100 MG: 100 CAPSULE, LIQUID FILLED ORAL at 20:50

## 2025-07-08 RX ADMIN — MIDAZOLAM 2 MG: 1 INJECTION INTRAMUSCULAR; INTRAVENOUS at 09:26

## 2025-07-08 RX ADMIN — FENTANYL CITRATE 50 MCG: 50 INJECTION INTRAMUSCULAR; INTRAVENOUS at 09:26

## 2025-07-08 RX ADMIN — Medication 100 MCG: at 11:29

## 2025-07-08 RX ADMIN — SODIUM CHLORIDE, POTASSIUM CHLORIDE, SODIUM LACTATE AND CALCIUM CHLORIDE 75 ML/HR: 600; 310; 30; 20 INJECTION, SOLUTION INTRAVENOUS at 09:15

## 2025-07-08 RX ADMIN — PROPOFOL 200 MG: 10 INJECTION, EMULSION INTRAVENOUS at 10:42

## 2025-07-08 RX ADMIN — LIDOCAINE HYDROCHLORIDE,EPINEPHRINE BITARTRATE 5 ML: 10; .01 INJECTION, SOLUTION INFILTRATION; PERINEURAL at 09:36

## 2025-07-08 RX ADMIN — DEXAMETHASONE SODIUM PHOSPHATE 4 MG: 4 INJECTION INTRA-ARTICULAR; INTRALESIONAL; INTRAMUSCULAR; INTRAVENOUS; SOFT TISSUE at 09:38

## 2025-07-08 RX ADMIN — Medication 100 MCG: at 10:54

## 2025-07-08 RX ADMIN — FENTANYL CITRATE 50 MCG: 50 INJECTION INTRAMUSCULAR; INTRAVENOUS at 10:40

## 2025-07-08 RX ADMIN — ONDANSETRON 4 MG: 2 INJECTION INTRAMUSCULAR; INTRAVENOUS at 12:29

## 2025-07-08 RX ADMIN — LIDOCAINE HYDROCHLORIDE 80 MG: 20 INJECTION, SOLUTION INFILTRATION; PERINEURAL at 10:42

## 2025-07-08 RX ADMIN — SUGAMMADEX 200 MG: 100 INJECTION, SOLUTION INTRAVENOUS at 12:31

## 2025-07-08 RX ADMIN — ROCURONIUM BROMIDE 20 MG: 10 INJECTION, SOLUTION INTRAVENOUS at 11:20

## 2025-07-08 RX ADMIN — Medication 100 MCG: at 11:17

## 2025-07-08 RX ADMIN — ACETAMINOPHEN 650 MG: 325 TABLET ORAL at 18:17

## 2025-07-08 RX ADMIN — CEFAZOLIN SODIUM 2 G: 2 SOLUTION INTRAVENOUS at 18:17

## 2025-07-08 RX ADMIN — ASPIRIN 81 MG: 81 TABLET, COATED ORAL at 20:50

## 2025-07-08 RX ADMIN — KETOROLAC TROMETHAMINE 30 MG: 30 INJECTION, SOLUTION INTRAMUSCULAR at 12:29

## 2025-07-08 RX ADMIN — TRANEXAMIC ACID 1000 MG: 1 INJECTION, SOLUTION INTRAVENOUS at 12:11

## 2025-07-08 RX ADMIN — EPHEDRINE SULFATE 10 MG: 50 INJECTION, SOLUTION INTRAVENOUS at 11:59

## 2025-07-08 RX ADMIN — FAMOTIDINE 20 MG: 10 INJECTION, SOLUTION INTRAVENOUS at 09:14

## 2025-07-08 RX ADMIN — EPHEDRINE SULFATE 15 MG: 50 INJECTION, SOLUTION INTRAVENOUS at 11:41

## 2025-07-08 RX ADMIN — CEFAZOLIN SODIUM 3 G: 3 INJECTION, SOLUTION INTRAVENOUS at 10:28

## 2025-07-08 RX ADMIN — INSULIN LISPRO 2 UNITS: 100 INJECTION, SOLUTION INTRAVENOUS; SUBCUTANEOUS at 20:36

## 2025-07-08 RX ADMIN — DOXYCYCLINE 100 MG: 100 CAPSULE ORAL at 20:50

## 2025-07-08 RX ADMIN — IBUPROFEN 600 MG: 600 TABLET ORAL at 18:17

## 2025-07-08 RX ADMIN — SIMVASTATIN 20 MG: 20 TABLET, FILM COATED ORAL at 20:50

## 2025-07-08 RX ADMIN — TRANEXAMIC ACID 1000 MG: 1 INJECTION, SOLUTION INTRAVENOUS at 11:01

## 2025-07-08 RX ADMIN — Medication 100 MCG: at 11:03

## 2025-07-08 SDOH — ECONOMIC STABILITY: FOOD INSECURITY: WITHIN THE PAST 12 MONTHS, THE FOOD YOU BOUGHT JUST DIDN'T LAST AND YOU DIDN'T HAVE MONEY TO GET MORE.: NEVER TRUE

## 2025-07-08 SDOH — SOCIAL STABILITY: SOCIAL INSECURITY: WITHIN THE LAST YEAR, HAVE YOU BEEN HUMILIATED OR EMOTIONALLY ABUSED IN OTHER WAYS BY YOUR PARTNER OR EX-PARTNER?: NO

## 2025-07-08 SDOH — SOCIAL STABILITY: SOCIAL INSECURITY: ABUSE: ADULT

## 2025-07-08 SDOH — ECONOMIC STABILITY: INCOME INSECURITY: IN THE PAST 12 MONTHS HAS THE ELECTRIC, GAS, OIL, OR WATER COMPANY THREATENED TO SHUT OFF SERVICES IN YOUR HOME?: NO

## 2025-07-08 SDOH — SOCIAL STABILITY: SOCIAL INSECURITY: HAVE YOU HAD THOUGHTS OF HARMING ANYONE ELSE?: NO

## 2025-07-08 SDOH — ECONOMIC STABILITY: HOUSING INSECURITY: AT ANY TIME IN THE PAST 12 MONTHS, WERE YOU HOMELESS OR LIVING IN A SHELTER (INCLUDING NOW)?: NO

## 2025-07-08 SDOH — SOCIAL STABILITY: SOCIAL INSECURITY: WERE YOU ABLE TO COMPLETE ALL THE BEHAVIORAL HEALTH SCREENINGS?: YES

## 2025-07-08 SDOH — SOCIAL STABILITY: SOCIAL INSECURITY: HAVE YOU HAD ANY THOUGHTS OF HARMING ANYONE ELSE?: NO

## 2025-07-08 SDOH — ECONOMIC STABILITY: FOOD INSECURITY: HOW HARD IS IT FOR YOU TO PAY FOR THE VERY BASICS LIKE FOOD, HOUSING, MEDICAL CARE, AND HEATING?: NOT VERY HARD

## 2025-07-08 SDOH — ECONOMIC STABILITY: HOUSING INSECURITY: IN THE LAST 12 MONTHS, WAS THERE A TIME WHEN YOU WERE NOT ABLE TO PAY THE MORTGAGE OR RENT ON TIME?: NO

## 2025-07-08 SDOH — ECONOMIC STABILITY: TRANSPORTATION INSECURITY: IN THE PAST 12 MONTHS, HAS LACK OF TRANSPORTATION KEPT YOU FROM MEDICAL APPOINTMENTS OR FROM GETTING MEDICATIONS?: NO

## 2025-07-08 SDOH — ECONOMIC STABILITY: FOOD INSECURITY: WITHIN THE PAST 12 MONTHS, YOU WORRIED THAT YOUR FOOD WOULD RUN OUT BEFORE YOU GOT THE MONEY TO BUY MORE.: NEVER TRUE

## 2025-07-08 SDOH — ECONOMIC STABILITY: HOUSING INSECURITY: IN THE PAST 12 MONTHS, HOW MANY TIMES HAVE YOU MOVED WHERE YOU WERE LIVING?: 0

## 2025-07-08 SDOH — ECONOMIC STABILITY: HOUSING INSECURITY: DO YOU FEEL UNSAFE GOING BACK TO THE PLACE WHERE YOU LIVE?: NO

## 2025-07-08 SDOH — HEALTH STABILITY: MENTAL HEALTH: CURRENT SMOKER: 0

## 2025-07-08 SDOH — SOCIAL STABILITY: SOCIAL INSECURITY: HAS ANYONE EVER THREATENED TO HURT YOUR FAMILY OR YOUR PETS?: NO

## 2025-07-08 SDOH — SOCIAL STABILITY: SOCIAL INSECURITY: WITHIN THE LAST YEAR, HAVE YOU BEEN AFRAID OF YOUR PARTNER OR EX-PARTNER?: NO

## 2025-07-08 SDOH — SOCIAL STABILITY: SOCIAL INSECURITY: DOES ANYONE TRY TO KEEP YOU FROM HAVING/CONTACTING OTHER FRIENDS OR DOING THINGS OUTSIDE YOUR HOME?: NO

## 2025-07-08 SDOH — SOCIAL STABILITY: SOCIAL INSECURITY: DO YOU FEEL ANYONE HAS EXPLOITED OR TAKEN ADVANTAGE OF YOU FINANCIALLY OR OF YOUR PERSONAL PROPERTY?: NO

## 2025-07-08 SDOH — SOCIAL STABILITY: SOCIAL INSECURITY: DO YOU FEEL UNSAFE GOING BACK TO THE PLACE WHERE YOU ARE LIVING?: NO

## 2025-07-08 SDOH — SOCIAL STABILITY: SOCIAL INSECURITY: ARE YOU OR HAVE YOU BEEN THREATENED OR ABUSED PHYSICALLY, EMOTIONALLY, OR SEXUALLY BY ANYONE?: NO

## 2025-07-08 SDOH — SOCIAL STABILITY: SOCIAL INSECURITY: ARE THERE ANY APPARENT SIGNS OF INJURIES/BEHAVIORS THAT COULD BE RELATED TO ABUSE/NEGLECT?: NO

## 2025-07-08 ASSESSMENT — ACTIVITIES OF DAILY LIVING (ADL)
HEARING - RIGHT EAR: FUNCTIONAL
GROOMING: NEEDS ASSISTANCE
PATIENT'S MEMORY ADEQUATE TO SAFELY COMPLETE DAILY ACTIVITIES?: YES
FEEDING YOURSELF: NEEDS ASSISTANCE
HEARING - LEFT EAR: FUNCTIONAL
WALKS IN HOME: INDEPENDENT
JUDGMENT_ADEQUATE_SAFELY_COMPLETE_DAILY_ACTIVITIES: YES
ADEQUATE_TO_COMPLETE_ADL: YES
TOILETING: NEEDS ASSISTANCE
LACK_OF_TRANSPORTATION: NO
DRESSING YOURSELF: NEEDS ASSISTANCE
BATHING: NEEDS ASSISTANCE

## 2025-07-08 ASSESSMENT — PAIN - FUNCTIONAL ASSESSMENT
PAIN_FUNCTIONAL_ASSESSMENT: 0-10
PAIN_FUNCTIONAL_ASSESSMENT: UNABLE TO SELF-REPORT
PAIN_FUNCTIONAL_ASSESSMENT: 0-10
PAIN_FUNCTIONAL_ASSESSMENT: UNABLE TO SELF-REPORT
PAIN_FUNCTIONAL_ASSESSMENT: 0-10

## 2025-07-08 ASSESSMENT — COGNITIVE AND FUNCTIONAL STATUS - GENERAL
STANDING UP FROM CHAIR USING ARMS: A LOT
CLIMB 3 TO 5 STEPS WITH RAILING: A LITTLE
TURNING FROM BACK TO SIDE WHILE IN FLAT BAD: A LITTLE
DAILY ACTIVITIY SCORE: 17
MOVING FROM LYING ON BACK TO SITTING ON SIDE OF FLAT BED WITH BEDRAILS: A LITTLE
MOBILITY SCORE: 17
DRESSING REGULAR UPPER BODY CLOTHING: A LITTLE
TOILETING: A LITTLE
PATIENT BASELINE BEDBOUND: NO
EATING MEALS: A LITTLE
PERSONAL GROOMING: A LITTLE
HELP NEEDED FOR BATHING: A LITTLE
WALKING IN HOSPITAL ROOM: A LITTLE
DRESSING REGULAR LOWER BODY CLOTHING: A LOT
MOVING TO AND FROM BED TO CHAIR: A LITTLE

## 2025-07-08 ASSESSMENT — PAIN SCALES - GENERAL
PAINLEVEL_OUTOF10: 0 - NO PAIN
PAIN_LEVEL: 2
PAINLEVEL_OUTOF10: 0 - NO PAIN
PAINLEVEL_OUTOF10: 2
PAINLEVEL_OUTOF10: 0 - NO PAIN
PAINLEVEL_OUTOF10: 0 - NO PAIN
PAINLEVEL_OUTOF10: 1
PAINLEVEL_OUTOF10: 2
PAINLEVEL_OUTOF10: 0 - NO PAIN
PAINLEVEL_OUTOF10: 0 - NO PAIN
PAINLEVEL_OUTOF10: 2
PAINLEVEL_OUTOF10: 2
PAINLEVEL_OUTOF10: 1
PAINLEVEL_OUTOF10: 2
PAINLEVEL_OUTOF10: 1
PAINLEVEL_OUTOF10: 2
PAINLEVEL_OUTOF10: 0 - NO PAIN
PAINLEVEL_OUTOF10: 2

## 2025-07-08 ASSESSMENT — PAIN DESCRIPTION - DESCRIPTORS
DESCRIPTORS: ACHING
DESCRIPTORS: SORE;NUMBNESS
DESCRIPTORS: ACHING
DESCRIPTORS: SORE;NUMBNESS
DESCRIPTORS: SORE;NUMBNESS
DESCRIPTORS: ACHING

## 2025-07-08 ASSESSMENT — LIFESTYLE VARIABLES
HOW MANY STANDARD DRINKS CONTAINING ALCOHOL DO YOU HAVE ON A TYPICAL DAY: 3 OR 4
AUDIT-C TOTAL SCORE: 4
AUDIT-C TOTAL SCORE: 4
HOW OFTEN DO YOU HAVE 6 OR MORE DRINKS ON ONE OCCASION: NEVER
HOW OFTEN DO YOU HAVE A DRINK CONTAINING ALCOHOL: 2-3 TIMES A WEEK
SKIP TO QUESTIONS 9-10: 0

## 2025-07-08 ASSESSMENT — COLUMBIA-SUICIDE SEVERITY RATING SCALE - C-SSRS
1. IN THE PAST MONTH, HAVE YOU WISHED YOU WERE DEAD OR WISHED YOU COULD GO TO SLEEP AND NOT WAKE UP?: NO
2. HAVE YOU ACTUALLY HAD ANY THOUGHTS OF KILLING YOURSELF?: NO
6. HAVE YOU EVER DONE ANYTHING, STARTED TO DO ANYTHING, OR PREPARED TO DO ANYTHING TO END YOUR LIFE?: NO

## 2025-07-08 ASSESSMENT — PATIENT HEALTH QUESTIONNAIRE - PHQ9
1. LITTLE INTEREST OR PLEASURE IN DOING THINGS: NOT AT ALL
2. FEELING DOWN, DEPRESSED OR HOPELESS: NOT AT ALL
SUM OF ALL RESPONSES TO PHQ9 QUESTIONS 1 & 2: 0

## 2025-07-08 NOTE — CONSULTS
"Inpatient consult to Medicine  Consult performed by: Loretta Schrader PA-C  Consult ordered by: Kristi Xiao PA-C        [facility] GENERAL MEDICINE CONSULTATION NOTE    ASSESSMENT AND PLAN:     Gen Solis II is a very pleasant 70 y.o. male with past medical history s/f HTN, HLD, HFpEF, JAMIL (no PAP), T2DM, anxiety/depression, obesity, and severe arthritis and rotator cuff tearing (R shoulder), who was admitted to the orthopedic service s/p rTSA with Dr. Hirsch. Medicine was consulted for medical management.    Severe arthritis and rotator cuff tearing (R shoulder) s/p rTSA with Dr. Hirsch on 07/08/25  -POD #0  -Post-op Dressing: C/D/I  -NVS: Intact  -Whittaker: n/a  -Pain Management / Pain Control: adequate; getting scheduled tylenol and ibuprofen, prn flexeril + oxycodone    -Patient is very sensitive to pain medications and reports he feels a \"foggy head\" with opiates at times. We discussed that if he takes any here and feels this way, to let us know. He does not recall which has specifically made him feel that way in the past.   -Diet / Appetite: not quite hungry yet, but his wife did order him a variety of foods for when he gets hungry  -DVT Prophylaxis: as per primary service (81mg ASA BID)  -Working with PT/OT     HTN, HLD  -BP: controlled on presentation, home therapies (Valsartan, Lasix) will be continued. Close monitoring and adjust as needed.   -HLD: will be continued on home therapies (Zocor)      Chronic HFpEF   -Patient appears euvolemic on exam and is overall compensated from this standpoint   -Will be continued on home medications (lasix)  -Continue outpatient follow-up with cardiology/HF as scheduled     JAMIL  -Patient is not currently on any PAP therapies     DM-II   -Hold home oral meds for now   -Continue with SSI ACHS   -Accucheks, hypoglycemic protocol   -Monitor and adjust as needed      Anxiety, depression   -Patient will be continued on home therapies (Lexapro)     Obesity (BMI 39.89 on " "admission)   -Patient does not meet morbid/severe criteria for obesity on admission   -Continued outpatient follow-up with PCP, healthy dietary and lifestyle changes as able      Other:   -I have reviewed the patient's chart and documentation/notes from Primary Care/PCP/Family Medicine, Internal Medicine/Hospitalist Service, Cardiology, Orthopedics, CPM / PAT, and Anesthesiology in the inpatient and/or outpatient setting   -I have reviewed the patient's most recent lab values in the chart (greater detail below)  -I have reconciled the patient's outside information and chart during this consultation, including: allergies, medications (confirmed at bedside as well), prior diagnoses, and immunizations.     Care / Plan Discussed With: Patient; Spouse (Wife - Ashly \"Susan\")    Loretta Schrader PA-C  General Inpatient Medicine (\"IMS\") / Hospitalist Service  Available via [employer] [phone]. Outside of these hours, please contact providers assigned to the team and/or via the Medicine Acute Pager.  Thank you for including the hospitalist service in the care of your patient. We will continue to follow while they remain admitted.     I spent a total of 65 minutes in the overall professional care of this patient on this date of service.    Dragon dictation software was used to dictate this note and thus there may be minor errors in translation/transcription including garbled speech or misspellings. Please contact for clarification if needed.    HISTORY OF PRESENT ILLNESS:     Primary Source: Patient  Secondary / Collateral Source (if applicable): Spouse (Wife - Ashly \"Susan\")    History Of Present Illness:    Gen Solis II is a very pleasant 70 y.o. male with a significant past medical history of HTN, HLD, HFpEF, JAMIL (no PAP), T2DM, anxiety/depression, obesity, and severe arthritis and rotator cuff tearing (R shoulder), who was admitted to the orthopedic service s/p rTSA with Dr. Hirsch. Medicine was consulted " "for medical management. Patient seen and examined with his very nice wife at the bedside. He is overall doing fairly well following his surgery; pain is under control. Not much of an appetite yet, but also not feeling especially nauseous or having any significant abdominal pain. As noted above, he describes that he is extremely sensitive to pain medications and will feel \"foggy\" with them at times, his wife confirms that he is really the only person in his family that is especially sensitive like this. We discussed if there was a particular medication that he can recall that he reacted this way with, and he doesn't recall there being one in particular. We discussed that if he takes any medications here that makes him feel this way, to let us know right away and we can always make some adjustments. He otherwise is without significant complaint. Denies cp/pressure, palpitations, diaphoresis, SOB, CASTAÑEDA, dizziness / lightheadedness, syncope or near syncope, HA, vision changes, f/c/n/v/d/abd pain. Patient is aware of hospitalist service presence in-house overnight if questions, concerns, or any changes in condition arise. All questions answered to the best of my ability.     Review of Systems:   I performed a complete 12 point review of systems and it is negative except as noted in HPI or above. All other systems have been reviewed and are negative.    PAST HISTORIES:     Past Medical History:  He has a past medical history of Anesthesia, Anxiety, Cataract (Aug 2022), Class 3 severe obesity with body mass index (BMI) of 40.0 to 44.9 in adult (03/17/2025), Contusion of lung (10/07/2024), Fracture of multiple ribs (10/07/2024), Fracture of transverse process of lumbar vertebra (Multi) (10/07/2024), Hyperlipidemia, Hypertension, Low testosterone in male, Medicare annual wellness visit, subsequent (04/10/2025), Obesity, Sleep apnea, and Type 2 diabetes mellitus without complication, without long-term current use of insulin " "(03/17/2025).    He has no past medical history of Asthma, Awareness under anesthesia, Chronic bronchitis (Multi), Delayed emergence from general anesthesia, Diabetes mellitus type I (Multi), GERD (gastroesophageal reflux disease), Hard to intubate, Malignant hyperthermia, or PONV (postoperative nausea and vomiting).    Past Surgical History:  He has a past surgical history that includes Cataract extraction (Bilateral); Appendectomy; Cholecystectomy; Nasal septum surgery; Vasectomy; Knee surgery (Right); and Reverse total shoulder arthroplasty (Right, 07/08/2025).      Social History:  He reports that he quit smoking about 28 years ago. His smoking use included cigarettes. He started smoking about 48 years ago. He has a 30 pack-year smoking history. He has never been exposed to tobacco smoke. He has never used smokeless tobacco. He reports current alcohol use of about 4.0 standard drinks of alcohol per week. He reports current drug use. Drug: Marijuana.    Family History:  Family History[1]    Allergies:  Bee venom protein (honey bee), Codeine, Penicillins, and Latex    OBJECTIVE:     Last Recorded Vitals:  Vitals:    07/08/25 1430 07/08/25 1445 07/08/25 1500 07/08/25 1520   BP: 140/65 122/65 127/65 157/74   BP Location: Left arm Left arm Left arm Left arm   Patient Position: Lying Lying Lying Lying   Pulse: 79 79 75 81   Resp: 14 12 19 17   Temp: 36.3 °C (97.4 °F) 36.3 °C (97.3 °F) 36.3 °C (97.3 °F) 35.9 °C (96.7 °F)   TempSrc: Temporal Temporal Temporal Temporal   SpO2: 94% 95% 96% 93%   Weight:       Height:         /74 (BP Location: Left arm, Patient Position: Lying)   Pulse 81   Temp 35.9 °C (96.7 °F) (Temporal)   Resp 17   Ht 1.803 m (5' 11\")   Wt 130 kg (286 lb)   SpO2 93%   BMI 39.89 kg/m²      Physical Exam:  Vital signs and nursing notes reviewed.   Constitutional: Pleasant and cooperative. Laying in bed in no acute distress. Conversant.   Skin: Warm and dry; post-op dressing C/D/I  Eyes: " EOMI. Anicteric sclera.   ENT: Mucous membranes moist; no obvious injury or deformity appreciated.   Head and Neck: Normocephalic, atraumatic. ROM preserved. Trachea midline. No appreciable JVD.   Respiratory: Nonlabored on RA. Lungs clear to auscultation bilaterally without obvious adventitious sounds. Chest rise is equal.  Cardiovascular: RRR. No gross murmur, gallop, or rub. Extremities are warm and well-perfused.   Chest Wall: No chest wall tenderness.   Gastrointestinal: Abdomen soft, obese, nontender, nondistended. Bowel sounds are present. Prior well-healed abdominal surgery scar (reports it from a gallbladder surgery as a child)  Genitourinary: No CVA tenderness.   Musculoskeletal: RUE in sling, post-op dressing C/D/I; distal NVS intact. Otherwise no acute abnormalities noted.   Extremities: No cyanosis, edema, or clubbing evident. Neurovascularly intact.   Neurologic: A&Ox3. CN 2-12 grossly intact. Able to respond to questions appropriately and clearly. No acute focal neurologic deficits appreciated.  Psychiatric: Appropriate mood and behavior.    MEDICATIONS:     Inpatient Medications:  Scheduled Medications[2]  PRN Medications[3]    Outpatient Medications:  Prior to Admission medications    Medication Sig Start Date End Date Taking? Authorizing Provider   acetaminophen (TylenoL) 325 mg tablet Take by mouth every 6 hours. 11/14/22  Yes Historical Provider, MD   escitalopram (Lexapro) 10 mg tablet Take 1 tablet (10 mg) by mouth once daily. 3/17/25  Yes James Carlos DO   furosemide (Lasix) 40 mg tablet Take 1 tablet (40 mg) by mouth once daily. 3/17/25 3/12/26 Yes James Carlos DO   multivitamin with minerals iron-free (Centrum Silver) Take 1 tablet by mouth once daily.   Yes Historical Provider, MD   olmesartan (BENIcar) 40 mg tablet Take 1 tablet (40 mg) by mouth once daily. 3/17/25  Yes James Carlos DO   simvastatin (Zocor) 20 mg tablet Take 1 tablet (20 mg) by mouth once daily. 3/17/25  Yes James KWON  DO Terrance   tirzepatide (Mounjaro) 7.5 mg/0.5 mL pen injector Inject 7.5 mg under the skin 1 (one) time per week. 5/30/25  Yes James Carlos DO   acetaminophen (Tylenol) 500 mg tablet Take 2 tablets every 8 hours for 5 days, then after 5 days take 2 tablets as needed for mild pain (1-3). 7/8/25   Kristi Xiao PA-C   amLODIPine (Norvasc) 10 mg tablet Take 1 tablet (10 mg) by mouth once daily. Do not fill before October 10, 2024.  Patient not taking: Reported on 7/8/2025 10/10/24   Андрей Hensley DO   aspirin 325 mg EC tablet Take 1 tablet (325 mg) by mouth 2 times a day. 7/8/25 8/7/25  Kristi Xiao PA-C   chlorhexidine (Peridex) 0.12 % solution Swish and Spit 15 ml the night before and the morning of surgery. (2 Doses)  Patient not taking: Reported on 7/8/2025 6/25/25   DIDI Harrell-CNS   docusate sodium (Colace) 100 mg capsule Take 1 capsule (100 mg) by mouth 2 times a day as needed for constipation for up to 14 days. 7/8/25 7/22/25  Kristi Xiao PA-C   doxycycline (Vibramycin) 100 mg capsule Take 1 capsule (100 mg) by mouth 2 times a day for 5 days. Take with at least 8 ounces (large glass) of water, do not lie down for 30 minutes after 7/8/25 7/13/25  Kristi Xiao PA-C   gabapentin (Neurontin) 300 mg capsule Take 1 capsule (300 mg) by mouth once daily at bedtime for 5 days. 7/8/25 7/13/25  Kristi Xiao PA-C   meloxicam (Mobic) 15 mg tablet Take 1 tablet (15 mg) by mouth once daily as needed for moderate pain (4 - 6) (pain). 7/8/25 8/7/25  Kristi Xiao PA-C   omeprazole OTC (PriLOSEC OTC) 20 mg EC tablet Take 1 tablet (20 mg) by mouth once daily in the morning. Take before meals. Do not crush, chew, or split.  Patient not taking: Reported on 7/8/2025    Historical Provider, MD   oxyCODONE (Roxicodone) 5 mg immediate release tablet Take 1 tablet (5 mg) by mouth every 6 hours if needed for severe pain (7 - 10) (Do not take within 3 hours of GABAPENTIN). 7/8/25   Kristi Xiao PA-C    testosterone 20.25 mg/1.25 gram (1.62 %) gel in metered-dose pump use 2 pumps EVERY DAY AS DIRECTED  Patient not taking: Reported on 7/8/2025 12/15/23   Pierce Leonard DO       LABS AND IMAGING:     Labs:  Recent labs personally reviewed in the EMR (from date: 06/25/25 + 07/08/25), including: CBC, BMP, and POC Glucose readings    Most recent CBC: WBC 5.8, Hgb 15.8, Plts 252.   Most recent chemistries: glucose 105, Na 138, K 4.0, BUN 15, sCr 0.72, alk phos 40, ALT 26, AST 19, bilirubin 0.5.     Imaging: I have personally reviewed the following imaging studies from 07/08/25: XR Shoulder (R), and agree with radiologist interpretation(s) as noted below  Imaging  XR shoulder right 2+ views  Result Date: 7/8/2025  Interval placement of intact reverse total right shoulder arthroplasty without acute fracture.   Signed by: Kg Saucedo 7/8/2025 2:33 PM Dictation workstation:   [id number]    Cardiology, Vascular, and Other Imaging  No other imaging results found for the past 7 days       [1]   Family History  Problem Relation Name Age of Onset    Alzheimer's disease Mother      Esophageal cancer Father      Diabetes Sister      Heart attack Sister      Heart attack Paternal Grandfather      Diabetes Paternal Grandfather      Stroke Mother's Sister      Alcohol abuse Maternal Grandfather Bialic     Cancer Father Adarsh Solis     Esophageal cancer Father Adarsh Solis     Heart disease Sister Jana Hersmen    [2] acetaminophen, 650 mg, oral, q6h DISHA  aspirin, 81 mg, oral, BID  ceFAZolin, 2 g, intravenous, q8h  docusate sodium, 100 mg, oral, BID  doxycylcine, 100 mg, oral, q12h DISHA  escitalopram, 10 mg, oral, Daily  furosemide, 40 mg, oral, Daily  ibuprofen, 600 mg, oral, TID  insulin lispro, 0-10 Units, subcutaneous, Before meals & nightly  simvastatin, 20 mg, oral, Daily  valsartan, 320 mg, oral, Daily    [3] PRN medications: acetaminophen, cyclobenzaprine, dextrose, dextrose, glucagon, glucagon, HYDROmorphone,  HYDROmorphone, melatonin, naloxone, ondansetron **OR** ondansetron, oxyCODONE, oxyCODONE

## 2025-07-08 NOTE — CARE PLAN
Problem: Pain - Adult  Goal: Verbalizes/displays adequate comfort level or baseline comfort level  Outcome: Progressing     Problem: Safety - Adult  Goal: Free from fall injury  Outcome: Progressing        The clinical goals for the shift include Pt will rate pain a 4 or less byend of this shift.  No fall or injury this shift. Current safety interventions continue, All needs met this shift. No new skin breakdown this shift.

## 2025-07-08 NOTE — ANESTHESIA PROCEDURE NOTES
Peripheral Block    Patient location during procedure: pre-op  Medication administered at: 7/8/2025 9:35 AM  End time: 7/8/2025 9:42 AM  Reason for block: at surgeon's request and post-op pain management  Staffing  Performed: CRNA   Authorized by: ROSSY Price    Performed by: ROSSY Price  Preanesthetic Checklist  Completed: patient identified, IV checked, site marked, risks and benefits discussed, surgical consent, monitors and equipment checked, pre-op evaluation and timeout performed   Timeout performed at: 7/8/2025 9:24 AM  Peripheral Block  Patient position: sitting  Prep: ChloraPrep  Patient monitoring: heart rate, cardiac monitor and continuous pulse ox  Block type: interscalene  Injection technique: single-shot  Guidance: nerve stimulator and ultrasound guided  Local infiltration: ropivacaine  Infiltration strength: 0.5 %  Dose: 15 mL  Needle  Needle type: pencil-tip   Needle gauge: 20 G  Needle length: 8 cm  Needle localization: nerve stimulator and ultrasound guidance  Assessment  Injection assessment: negative aspiration for heme, no paresthesia on injection, incremental injection and local visualized surrounding nerve on ultrasound  Paresthesia pain: none  Heart rate change: no  Slow fractionated injection: yes  Additional Notes  Anesthesia consult was placed by Dr. Hirsch for post procedural analgesia.  The patient's chart was reviewed and they were deemed an appropriate candidate for the procedure. The patient was educated in detail on the risks, benefits, and alternatives to the block including but not limited to: temporary or permanent nerve damage, bleeding, infection, damage to surrounding tissues, possible block failure and the potential for local anesthesia toxicity syndrome.  The patient expressed understanding and all questions were answered prior to the initiation of the procedure.  Informed consent was also signed by the patient and laterality determined per  "institutional policy.  A formal \"time out \"consistent with the institutions rules and regulations was performed by the anesthesia provider and appropriate RN.     Procedure  The patient was placed in the sitting position. The RIGHT side was marked and skin prep applied and allowed to dry. Proper monitors were applied with oxygen.  Sedation was provided by administering     Versed 2 mg IV  Fentanyl 50 mcg IV    A high frequency linear ultrasound probe with probe cover and sterile coupling gel was applied over the anterior neck and C-5/6/7 roots tightly located. The fascial planes between the anterior and middle scalene muscles as well as the great vessels of the neck were also identified. The probe was then positioned for a short axis view structural view, allowing for exclusion of any nearby vessels,and doppler mode was engaged to assist in this identification.   A 20G stimuplex needle was then advanced maintaining an in-plane visualization throughout the procedure, under ultrasound guidance from lateral to medial to come to rest just deep to the neurovascular sheath, but avoiding contact of the brachial plexus. A motor response was visualized from the nerve stimulator, loss of response was seen at 0.49 mAmp.  Upon negative aspiration, 20 ml 0.5% Ropivacaine with 4 mg decadron preservative free was administered safely and cautiously between the muscle planes.  Aspiration every 3-5mls was done to avoid potential intravascular injection.  All injections were done without resistance and were free of blood/ CSF.  The patient tolerated the procedure well without report of intense pain, tinnitus, metallic taste, or circumoral numbness.  The block was then evaluated after a few minutes and appeared to be functioning appropriately.                "

## 2025-07-08 NOTE — DISCHARGE SUMMARY
Discharge Diagnosis  Status post reverse total shoulder replacement, right    Issues Requiring Follow-Up  Status post reverse total shoulder replacement, right    Test Results Pending At Discharge  Pending Labs       No current pending labs.            Hospital Course   Patient was admitted to the hospital on 7/8/2025 following  right reverse total shoulder replacement that was performed without complications.  The patient was admitted for IV antibiotics and observation.  The patient was discharged to home on 7/9/2025 without complications.       Visit Vitals  /65   Pulse 64   Temp 36.2 °C (97.1 °F) (Temporal)   Resp 18     Vitals:    07/08/25 0903   Weight: 130 kg (286 lb)       Immunization History   Administered Date(s) Administered    DTaP vaccine, pediatric  (INFANRIX) 04/15/2015       Results        Pertinent Physical Exam At Time of Discharge  Right shoulder dressing and shoulder immobilizer in place without concerns    Home Medications     Medication List      START taking these medications     aspirin 325 mg EC tablet; Take 1 tablet (325 mg) by mouth 2 times a day.   docusate sodium 100 mg capsule; Commonly known as: Colace; Take 1   capsule (100 mg) by mouth 2 times a day as needed for constipation for up   to 14 days.   doxycycline 100 mg capsule; Commonly known as: Vibramycin; Take 1   capsule (100 mg) by mouth 2 times a day for 5 days. Take with at least 8   ounces (large glass) of water, do not lie down for 30 minutes after   gabapentin 300 mg capsule; Commonly known as: Neurontin; Take 1 capsule   (300 mg) by mouth once daily at bedtime for 5 days.   meloxicam 15 mg tablet; Commonly known as: Mobic; Take 1 tablet (15 mg)   by mouth once daily as needed for moderate pain (4 - 6) (pain).   oxyCODONE 5 mg immediate release tablet; Commonly known as: Roxicodone;   Take 1 tablet (5 mg) by mouth every 6 hours if needed for severe pain (7 -   10) (Do not take within 3 hours of GABAPENTIN).     CHANGE  how you take these medications     acetaminophen 500 mg tablet; Commonly known as: Tylenol; Take 2 tablets   every 8 hours for 5 days, then after 5 days take 2 tablets as needed for   mild pain (1-3).; What changed: medication strength, how much to take,   when to take this, reasons to take this     CONTINUE taking these medications     escitalopram 10 mg tablet; Commonly known as: Lexapro; Take 1 tablet (10   mg) by mouth once daily.   furosemide 40 mg tablet; Commonly known as: Lasix; Take 1 tablet (40 mg)   by mouth once daily.   Mounjaro 7.5 mg/0.5 mL pen injector; Generic drug: tirzepatide; Inject   7.5 mg under the skin 1 (one) time per week.   multivitamin with minerals iron-free; Commonly known as: Centrum Silver   olmesartan 40 mg tablet; Commonly known as: BENIcar; Take 1 tablet (40   mg) by mouth once daily.   simvastatin 20 mg tablet; Commonly known as: Zocor; Take 1 tablet (20   mg) by mouth once daily.     ASK your doctor about these medications     amLODIPine 10 mg tablet; Commonly known as: Norvasc; Take 1 tablet (10   mg) by mouth once daily. Do not fill before October 10, 2024.   chlorhexidine 0.12 % solution; Commonly known as: Peridex; Swish and   Spit 15 ml the night before and the morning of surgery. (2 Doses)   omeprazole OTC 20 mg EC tablet; Commonly known as: PriLOSEC OTC   testosterone 20.25 mg/1.25 gram (1.62 %) gel in metered-dose pump; use 2   pumps EVERY DAY AS DIRECTED       Outpatient Follow-Up  Future Appointments   Date Time Provider Department [place]   7/25/2025 10:45 AM Kristi Xiao PA-C [id number] [facility]   7/30/2025  9:00 AM James Carlos DO [id number] None       Kristi Xiao PA-C

## 2025-07-08 NOTE — ANESTHESIA PROCEDURE NOTES
Airway  Date/Time: 7/8/2025 10:43 AM  Reason: elective    Airway not difficult    Staffing  Performed: CRNA   Authorized by: ROSSY Gaytan    Performed by: ROSSY Gaytan  Patient location during procedure: OR    Patient Condition  Indications for airway management: anesthesia  Patient position: sniffing  Sedation level: deep     Final Airway Details   Preoxygenated: yes  Final airway type: endotracheal airway  Successful airway: ETT  Cuffed: yes   Successful intubation technique: video laryngoscopy  Adjuncts used in placement: intubating stylet  Endotracheal tube insertion site: oral  Blade: Wilbur ([employer])  Blade size: #3  ETT size (mm): 7.5  Cormack-Lehane Classification: grade I - full view of glottis  Placement verified by: chest auscultation and capnometry   Cuff volume (mL): 6  Measured from: lips  ETT to lips (cm): 22  Number of attempts at approach: 1

## 2025-07-08 NOTE — ANESTHESIA PREPROCEDURE EVALUATION
Patient: Leisa Jean II    Procedure Information       Anesthesia Start Date/Time: 04/10/25 0956    Scheduled providers: Hay Yusuf MD; Arun Vidal MD    Procedure: COLONOSCOPY    Location: [facility] OR            Relevant Problems   Anesthesia (within normal limits)  Per chart review, notes say pt was agitated and angry after anesthesia in the past.   Per patient he's had no issues waking up from anesthesia.       Cardiac   (+) Essential hypertension   (+) Hyperlipidemia   (+) Hypertension   (+) Mixed hyperlipidemia   (+) Peripheral vascular disease, unspecified      Pulmonary  Snores (likely undiagnosed JAMIL)      Neuro   (+) Anxiety   (+) Depression      GI (within normal limits)      /Renal (within normal limits)      Liver (within normal limits)      Endocrine   (+) Morbid (severe) obesity due to excess calories (Multi)   (+) Type 2 diabetes mellitus without complication, without long-term current use of insulin      Hematology (within normal limits)      Musculoskeletal   (+) Primary osteoarthritis of both shoulders      ID   (+) COVID   (+) COVID-19        Transthoracic Echo (TTE) Complete  Result Date: 10/8/2024   [facility], [address] [place] [address]               Tel [phone] and Fax [phone] TRANSTHORACIC ECHOCARDIOGRAM REPORT  Patient Name:      LEISA JEAN         Reading Physician:    [id number] Lowry MD Study Date:        10/8/2024            Ordering Provider:    [id number] NADEEM KELLEY MRN/PID:           [id number]             Fellow: Accession#:        [id number]         Nurse: Date of Birth/Age: 1955 / 69 years Sonographer:          Paradise López RDCS Gender:            M                    Additional Staff: Height:            154.94 cm             Admit Date:           10/7/2024 Weight:            142.43 kg            Admission Status:     Inpatient -                                                               Routine BSA / BMI:         2.29 m2 / 59.33      Encounter#:           [id number]                    kg/m2 Blood Pressure:    162/67 mmHg          Department Location:  [facility] Non                                                               Invasive Study Type:    TRANSTHORACIC ECHO (TTE) COMPLETE Diagnosis/ICD: Heart failure, unspecified-I50.9; Acute on chronic combined                systolic (congestive) and diastolic (congestive) heart failure                (CHF)-I50.43 Indication:    Congestive Heart Failure CPT Code:      Echo Complete w Full Doppler-[id number] Patient History: Pertinent History: LE Edema and Dyspnea. Study Detail: The following Echo studies were performed: 2D, M-Mode, Doppler and               color flow. Technically challenging study due to body habitus,               prominent lung artifact and COVID protocol. Unable to obtain RA,RV               and suprasternal notch view. The patient was awake.  PHYSICIAN INTERPRETATION: Left Ventricle: Left ventricular ejection fraction is normal, by visual estimate at 65-70%. The left ventricular cavity size was not assessed. Spectral Doppler shows a normal pattern of left ventricular diastolic filling. Left Atrium: The left atrium was not well visualized. Probably normal size. Right Ventricle: The right ventricle is normal in size. There is normal right ventricular global systolic function. Right Atrium: The right atrium was not well visualized. Probably normal size. Aortic Valve: The aortic valve is probably trileaflet. There is minimal aortic valve cusp calcification. The aortic valve dimensionless index is 0.80. There is no evidence of aortic valve regurgitation. The peak instantaneous gradient of the aortic valve is 11.1 mmHg. The mean gradient of the aortic valve is 5.4  mmHg. Mitral Valve: The mitral valve is mildly thickened. There is mild mitral annular calcification. There is trace to mild mitral valve regurgitation. Tricuspid Valve: The tricuspid valve was not well visualized. There is trace tricuspid regurgitation. The right ventricular systolic pressure is unable to be estimated. Pulmonic Valve: The pulmonic valve is not well visualized. There is physiologic pulmonic valve regurgitation. Pericardium: There is no pericardial effusion noted. Aorta: The aortic root is normal. Systemic Veins: The inferior vena cava appears normal in size, with IVC inspiratory collapse greater than 50%. In comparison to the previous echocardiogram(s): There are no prior studies on this patient for comparison purposes.  CONCLUSIONS:  1. Poorly visualized anatomical structures due to suboptimal image quality.  2. Left ventricular ejection fraction is normal, by visual estimate at 65-70%.  3. There is normal right ventricular global systolic function. QUANTITATIVE DATA SUMMARY:  2D MEASUREMENTS:          Normal Ranges: LVEDV Index:     33 ml/m2  LA VOLUME:                    Normal Ranges: LA Vol A4C:        57.5 ml    (22+/-6mL/m2) LA Vol A2C:        54.3 ml LA Vol BP:         58.4 ml LA Vol Index A4C:  25.1 ml/m2 LA Vol Index A2C:  23.7 ml/m2 LA Vol Index BP:   25.5 ml/m2 LA Area A4C:       19.8 cm2 LA Area A2C:       18.4 cm2 LA Major Axis A4C: 5.8 cm LA Major Axis A2C: 5.3 cm LA Vol A4C:        52.7 ml LA Vol A2C:        49.9 ml LA Vol Index BSA:  22.4 ml/m2  AORTA MEASUREMENTS:         Normal Ranges: Ao Sinus, d:        2.90 cm (2.1-3.5cm) Asc Ao, d:          3.30 cm (2.1-3.4cm)  LV SYSTOLIC FUNCTION BY 2D PLANIMETRY (MOD):                      Normal Ranges: EF-A4C View:    62 % (>=55%) EF-A2C View:    56 % EF-Biplane:     58 % EF-Visual:      68 % LV EF Reported: 68 %  LV DIASTOLIC FUNCTION:             Normal Ranges: MV Peak E:             0.72 m/s    (0.7-1.2 m/s) MV Peak A:             0.69  m/s    (0.42-0.7 m/s) E/A Ratio:             1.04        (1.0-2.2) MV e'                  0.085 m/s   (>8.0) MV lateral e'          0.07 m/s MV medial e'           0.10 m/s MV A Dur:              128.45 msec E/e' Ratio:            8.53        (<8.0)  MITRAL VALVE:          Normal Ranges: MV DT:        192 msec (150-240msec)  AORTIC VALVE:                      Normal Ranges: AoV Vmax:                1.67 m/s  (<=1.7m/s) AoV Peak P.1 mmHg (<20mmHg) AoV Mean P.4 mmHg  (1.7-11.5mmHg) LVOT Max Pramod:            1.00 m/s  (<=1.1m/s) AoV VTI:                 28.41 cm  (18-25cm) LVOT VTI:                22.66 cm LVOT Diameter:           2.08 cm   (1.8-2.4cm) AoV Area, VTI:           2.71 cm2  (2.5-5.5cm2) AoV Area,Vmax:           2.04 cm2  (2.5-4.5cm2) AoV Dimensionless Index: 0.80  RIGHT VENTRICLE: TAPSE: 15.0 mm RV s'  0.12 m/s  TRICUSPID VALVE/RVSP:          Normal Ranges: Peak TR Velocity:     0.43 m/s RV Syst Pressure:     9 mmHg   (< 30mmHg) IVC Diam:             1.80 cm  PULMONIC VALVE:          Normal Ranges: PV Max Pramod:     0.9 m/s  (0.6-0.9m/s) PV Max PG:      3.4 mmHg  AORTA: Asc Ao Diam 3.27 cm  [id number] Brittany oLwry MD Electronically signed on 10/8/2024 at 11:56:09 AM  ** Final **        No results found for this or any previous visit (from the past 4464 hours).       Clinical information reviewed:   Tobacco  Allergies  Meds  Problems  Med Hx  Surg Hx   Fam Hx  Soc   Hx        NPO Detail:  NPO/Void Status  Date of Last Liquid: 25  Time of Last Liquid: 1800  Date of Last Solid: 25  Time of Last Solid: 2300  Last Intake Type: Clear fluids         Physical Exam    Airway  Mallampati: II  Neck ROM: full  Mouth opening: 3 or more finger widths     Cardiovascular - normal exam   Dental - normal exam     Pulmonary - normal exam   Abdominal (+) obese             Anesthesia Plan    History of general anesthesia?: yes  History of complications of general anesthesia?:  no    ASA 3     general and regional   (Right Interscalene Nerve Block  GA)  The patient is not a current smoker.    intravenous induction   Anesthetic plan and risks discussed with patient.  Use of blood products discussed with patient who consented to blood products.    Plan discussed with attending.

## 2025-07-08 NOTE — ANESTHESIA POSTPROCEDURE EVALUATION
Patient: Gen Solis II    Procedure Summary       Date: 07/08/25 Room / Location: [facility] OR 06 / Virtual [facility]    Anesthesia Start: 1027 Anesthesia Stop: 1248    Procedure: RIGHT REVERSE total shoulder replacement; NOT an anatomic total shoulder replacement *23 HRS OBS* ([employer] reverse shoulder arthroplasty equipment) (Right: Shoulder) Diagnosis:       Right shoulder pain, unspecified chronicity      Arthritis of right shoulder      Complete tear of right rotator cuff, unspecified whether traumatic      Biceps tendinitis of right shoulder      Arthritis of right shoulder region      (Right shoulder pain, unspecified chronicity [M25.511])      (Arthritis of right shoulder [M19.011])      (Complete tear of right rotator cuff, unspecified whether traumatic [M75.121])      (Biceps tendinitis of right shoulder [M75.21])      (Arthritis of right shoulder region [M19.011])    Surgeons: MANN Hirsch MD Responsible Provider: DIDI Gaytan-HELDER    Anesthesia Type: general, regional ASA Status: 3            Anesthesia Type: general, regional    Vitals Value Taken Time   /65 07/08/25 15:00   Temp 36.3 °C (97.3 °F) 07/08/25 15:00   Pulse 75 07/08/25 15:00   Resp 15 07/08/25 15:01   SpO2 96 % 07/08/25 15:03   Vitals shown include unfiled device data.    Anesthesia Post Evaluation    Patient location during evaluation: PACU  Patient participation: complete - patient participated  Level of consciousness: awake  Pain score: 2  Pain management: adequate  Airway patency: patent  Cardiovascular status: acceptable  Respiratory status: acceptable  Hydration status: acceptable  Postoperative Nausea and Vomiting: none        No notable events documented.

## 2025-07-08 NOTE — OP NOTE
RIGHT REVERSE total shoulder replacement; NOT an anatomic total shoulder replacement *23 HRS OBS* (Biomet reverse shoulder arthroplasty equipment) (R) Operative Note     Date: 2025  OR Location: [facility]    Name: Gen Solis II, : 1955, Age: 70 y.o., MRN: [id number], Sex: male    ORTHOPEDIC OPERATIVE REPORT / POST-OP NOTE    SURGEON:  Aubrey Hirsch MD  ASSISTANT(S):  Kristi Xiao PA-C, Maksim Kitchen SA  PRE-OPERATIVE DIAGNOSIS: Right shoulder rotator cuff tear with severe glenohumeral arthritis, proximal biceps tendinopathy/partial tearing  POST-OPERATIVE DIAGNOSIS:  Same  PROCEDURE: Right shoulder reverse total shoulder arthroplasty, biceps tenodesis  CPT CODE(S):  99391, 23161-93  ANESTHESIA:  general + regional  ESTIMATED BLOOD LOSS: 250 mL  SPECIMEN:  None  FINDINGS:  Above  COMPLICATIONS:  None  CONDITION:  Stable to the Recovery Room  TASKS PERFORMED BY RNFA OR SURGICAL ASSISTANT:  holding or positioning the extremity, retracting, helping manage the electrocautery, etc.    I, Dr Hirsch, was present and scrubbed for the entire surgical procedure, including wound closure.     No qualified Orthopedic Resident was available to assist with this procedure.  Therefore, the assistance of Kristi Xiao PA-C, was required throughout this entire procedure.      Kristi Xiao PA-C, is specifically trained and experienced in assisting with this procedure.  During the procedure, she actively assisted Dr. Hirsch in completing the operation safely and expeditiously by helping to provide exposure, maintain hemostasis, and served other technical functions, such as suturing, assisting in drilling, etc.    We will be billing for Kristi Xiao PA-C, as a required First Assistant for this procedure.      INDICATION FOR SURGERY:  70-year-old male with longstanding right shoulder pain and weakness.  We treated the patient's shoulder pain and weakness conservatively with anti-inflammatories, physical therapy, and  steroid injections.  The patient failed to have significant relief.  X-rays and MRI confirmed a rotator cuff tear in the shoulder with glenohumeral arthritis.  The patient was also having pain over their proximal biceps tendon.  We discussed a reverse total shoulder replacement and biceps tenodesis done at the same time due to the patient's shoulder pain and weakness and proximal biceps pain.  The patient understood all the risks versus benefits of operative and non-operative treatment options.  The risks of shoulder replacement were discussed, which included but were not limited to: risk of infection or risk of injury to the axillary nerve, risks of bleeding, nerve, artery, or muscle damage, risk of continued pain or fracture either intra-operatively or post-operatively, risk of post-operative acromial stress fracture or displaced fracture, risk of dislocation or disassociation of the reverse shoulder replacement, risk of need for additional surgery, risk of anesthesia including risks of heart attack, stroke, or even death.  The patient wanted to proceed with surgery and signed appropriate surgical consents.  On the morning of surgery, I signed the patient's operative shoulder the preoperative holding area.      PROCEDURE:  The procedure was performed using the [employer] Comprehensive Reverse Shoulder system.  The patient was brought to the operating room after anesthesia performed a block on the patient's operative upper extremity.  The patient was placed supine on the operating room table and all of their bony prominences were padded.  A operating room huddle was performed and the patient received IV antibiotics.  The patient was placed into a beachchair position with all of their genia prominences padded and SCDs were applied to the lower extremities and used throughout the procedure.     The patient's right upper extremity was prepped and draped in the normal sterile fashion.  A pre-incision timeout was called.   An incision was made over the deltopectoral skin which was then carried down bluntly to the deltopectoral fascia.  Bleeders were coagulated with electrocautery.  The deltopectoral fascia was incised and the cephalic vein was identified and retracted laterally with the deltoid and protected throughout the procedure.  The pectoralis major was retracted medially throughout the procedure.  The superior aspect the pectoralis major was released in order to aid in visualization.  The biceps tendon was identified and found to be erythematous and partially torn.  The decision was made to proceed with a biceps tenodesis as planned due to the damaged nature of the biceps and the fact that the patient had been having pain in this area preoperatively.  The biceps was sewn to the superior aspect the pectoralis major using #2 FiberWire interrupted sutures and then cut just proximal to the biceps tenodesis, completing the biceps tenodesis.  The subscapularis was then tenotomized 1 cm medial to its insertion on the lesser tuberosity.  The shoulder was externally rotated and a  rotator cuff tear was identified and severe glenohumeral arthritis was seen.    Starting reamers were used up to a size 11 reamer which is found to have excellent fit.  The [employer] cutting guide was placed at 30 degrees of external rotation and a 45 degree cut was made on the humeral head.  Osteophytes were removed with a rongeur.    Broaches were used up to a size 11 which was found to have an excellent fit.    Attention was turned to the glenoid and appropriate retractors were placed.  The labrum was excised.  A guidepin was drilled at a 10 degree cephalad angle using the [employer] guide.  The guidepin was overdrilled with the [employer] glenoid reamer.  The [employer] comprehensive reverse shoulder glenosphere mini baseplate was then impacted into the glenoid.  The central non-locking screw was then measured and inserted with excellent compression.  The 4 peripheral  locking screws were then drilled, measured, and inserted.  The [employer] comprehensive reverse shoulder glenosphere was then impacted onto the mini baseplate and found to be extremely stable.    The shoulder was then trialed with a standard poly and was found to have excellent range of motion with excellent stability.  The humeral components were removed and the shoulder was thoroughly pulsed with pulse lavage.    A [employer] comprehensive shoulder system mini humeral stem 11 mm in diameter by 83 mm long was impacted in the humerus with excellent fit.  A Bi[employer] comprehensive reverse shoulder system mini humeral tray 0 millimeter taper offset, 40 mm diameter, with highly cross-linked polyethylene was then impacted onto the humeral stem and found to be very stable.  The shoulder was then reduced and found to have excellent range of motion with excellent stability.  The shoulder was then thoroughly pulsed with pulse lavage, followed by Irrisept irrigation, followed by 1 g of vancomycin powder sprinkled over the reverse shoulder replacement.  The subscapularis was repaired to the lesser tuberosity using #2 FiberWire interrupted sutures.  The deltopectoral fascia was loosely reapproximated with 0 Vicryl interrupted sutures.  The skin was closed in layers with 0 Vicryl interrupted sutures for the deep tissue, 2-0 Vicryl interrupted sutures for the subcutaneous tissue, and staples in the skin.  Xeroform, dry sterile dressing, and a gunslinger (0 degree internal/external rotation) shoulder immobilizer was applied.  The patient was then awoken brought to recovery room in good condition.  There were no complications.  Postoperative x-rays were checked in the recovery room and found to have proper location of the reverse shoulder replacement with no fractures seen.

## 2025-07-08 NOTE — DISCHARGE INSTRUCTIONS
Wound Care     Your dressing should remain intact and dry until post-op visit in the office. No showering until seen in clinic-sitting in the bathtub to sponge bathe is ok. Place a protective cover (large garbage bag) over your shoulder with sling on while bathing. Do NOT submerge your operative shoulder in bath or pool water, or get any of your dressings or sling wet.     Surgical Dressing     If your dressing becomes soiled or damp, you may remove the dressing and replace the bandage. Please do not remove steri-strips (small pieces of tape) covering your incisions. Please be certain to wash hands thoroughly prior to changing dressing, do not place any ointments over incisions.     Sling/Immobilizer     Anesthesia effects can last up until 48 hours after surgery.  Please be aware that you may experience numbness in your arm for up to 48 hours after surgery.  During this time it is extremely important that you keep your sling in place at all times because you will not have control of your arm due to the anesthesia effects.  Your sling with supporting abduction pillow should be worn at all times.  Maintain your elbow position against the pillow and even with your side or in front of this position to minimize stress on the repair.       You will be asked to keep your sling/immobilizer in place at all times during the first 4-6 weeks following surgery.  Please do not discontinue the use of the sling without clearance from Dr. Hirsch.  During the first 4-6 weeks following surgery, it is very important to limit activity with your arm to achieve optimal results from your surgery.  Never remove sling and raise arm up and down as this may cause injury to your surgically repaired shoulder.     Activity     When sleeping or resting, inclined positions (i.e. reclining chair) using a pillow under the forearm for support may provide better comfort  Do not engage in activities which increase pain/swelling over the first 7-10 days  following surgery  Avoid long periods of sitting (without arm supported) or long distance traveling for 2 weeks  May return to ONLY sedentary work  3-4 days after surgery, if pain is tolerable     Continuous icing will help to decrease swelling and provide pain relief.  You may use ice packs every 2 hours for 20 minutes daily until your first post-operative visit.  It is very important to always have protection between the ice pad and your skin.  Never place the ice pad directly on your skin; this could lead to an injury to your skin.  If the ice causes increased pain, skin rash or irritation, discontinue its use and call the office.      Driving     Please do not drive until you are evaluated in the office after surgery.  You are considered an impaired  following surgery, and if you choose to drive, your insurance may not cover any damages that may occur.     Post-operative Medication     1.  Aspirin 325mg 1 tablet twice a day for 4 weeks following surgery.  Aspirin helps to reduce the risk of blood clots following surgery.     2. Colace 1 tablet twice a day when needed for constipation while taking pain medication.    3.  Mobic 15 mg take 1 tablet daily for 5 days, after 5 days take 1 tablet daily only as needed for pain.     4.Tylenol 1gm every 8 hours for 5 days, after 5 days take 1gm every 8 hours only as needed for pain.     5.Gabapentin 300mg daily at bedtime for 5 days, after 5 days you should no longer need this medication.     6. Doxycycline 100mg 1 tablet twice daily for 5 days or 30 days as prescription is written     7. Oxy IR 5mg every 6 hours ONLY TO BE TAKEN FOR SEVERE PAIN.  Do NOT take Oxy IR within 3 hours of taking Gabapentin. Do NOT take Gabapentin within 3 hours of taking Oxy IR. If you are having severe pain and taking Oxy IR at night skip the nightly Gabapentin and only resume when you are not taking Oxy IR at night     Signs and Symptoms of Complications     Although complications are  rare, the following are a list of potential symptoms you should be alert for.    Infection - Increased pain not relieved with medication, fever (temperature of 101.5 degrees Fahrenheit or higher), chills, redness, swelling or drainage (yellow/brown/green) from incision.  Blood Clot - If you experience shortness of breath, chest pain, pain in your chest with deep breaths or difficulty breathing, please report to emergency room immediately.   Persistent Pain - Severe sharp pain not relieved by pain medication.  Persistent and increasing swelling and numbness of the arm.       If a follow-up visit after surgery was not scheduled, please call Dr. Hirsch´s office at [phone] during office business hours (Monday to Friday, 8:30am to 3:30pm) to arrange a follow-up appointment for 2 weeks after your surgery.    If you have any questions, please call Dr. Aguilar office at [phone] during office business hours (Monday to Friday, 8:30am to 3:30pm).  Please do not call Dr. Aguilar office after 3:30pm or on weekends or holidays.  If you have an urgent issue after 3:30pm or on weekends or holidays, please go immediately to a local emergency room to be evaluated.

## 2025-07-09 ENCOUNTER — PHARMACY VISIT (OUTPATIENT)
Dept: PHARMACY | Facility: CLINIC | Age: 70
End: 2025-07-09
Payer: COMMERCIAL

## 2025-07-09 VITALS
RESPIRATION RATE: 16 BRPM | BODY MASS INDEX: 40.04 KG/M2 | SYSTOLIC BLOOD PRESSURE: 140 MMHG | TEMPERATURE: 96.5 F | OXYGEN SATURATION: 93 % | HEART RATE: 67 BPM | HEIGHT: 71 IN | WEIGHT: 286 LBS | DIASTOLIC BLOOD PRESSURE: 68 MMHG

## 2025-07-09 LAB — GLUCOSE BLD MANUAL STRIP-MCNC: 112 MG/DL (ref 74–99)

## 2025-07-09 PROCEDURE — 97165 OT EVAL LOW COMPLEX 30 MIN: CPT | Mod: GO

## 2025-07-09 PROCEDURE — 82947 ASSAY GLUCOSE BLOOD QUANT: CPT

## 2025-07-09 PROCEDURE — RXMED WILLOW AMBULATORY MEDICATION CHARGE

## 2025-07-09 PROCEDURE — 97535 SELF CARE MNGMENT TRAINING: CPT | Mod: GO

## 2025-07-09 PROCEDURE — 99231 SBSQ HOSP IP/OBS SF/LOW 25: CPT | Performed by: PHYSICIAN ASSISTANT

## 2025-07-09 PROCEDURE — 2500000004 HC RX 250 GENERAL PHARMACY W/ HCPCS (ALT 636 FOR OP/ED)

## 2025-07-09 PROCEDURE — 7100000011 HC EXTENDED STAY RECOVERY HOURLY - NURSING UNIT

## 2025-07-09 PROCEDURE — 2500000001 HC RX 250 WO HCPCS SELF ADMINISTERED DRUGS (ALT 637 FOR MEDICARE OP)

## 2025-07-09 RX ORDER — NALOXONE HYDROCHLORIDE 4 MG/.1ML
SPRAY NASAL
Qty: 2 EACH | Refills: 0 | OUTPATIENT
Start: 2025-07-09

## 2025-07-09 RX ADMIN — VALSARTAN 320 MG: 320 TABLET, FILM COATED ORAL at 08:48

## 2025-07-09 RX ADMIN — IBUPROFEN 600 MG: 600 TABLET ORAL at 08:48

## 2025-07-09 RX ADMIN — DOCUSATE SODIUM 100 MG: 100 CAPSULE, LIQUID FILLED ORAL at 08:48

## 2025-07-09 RX ADMIN — ACETAMINOPHEN 650 MG: 325 TABLET ORAL at 10:53

## 2025-07-09 RX ADMIN — CEFAZOLIN SODIUM 2 G: 2 SOLUTION INTRAVENOUS at 02:38

## 2025-07-09 RX ADMIN — ACETAMINOPHEN 650 MG: 325 TABLET ORAL at 05:41

## 2025-07-09 RX ADMIN — ESCITALOPRAM OXALATE 10 MG: 10 TABLET ORAL at 08:48

## 2025-07-09 RX ADMIN — DOXYCYCLINE 100 MG: 100 CAPSULE ORAL at 08:48

## 2025-07-09 RX ADMIN — ASPIRIN 81 MG: 81 TABLET, COATED ORAL at 08:48

## 2025-07-09 RX ADMIN — FUROSEMIDE 40 MG: 40 TABLET ORAL at 08:48

## 2025-07-09 RX ADMIN — ACETAMINOPHEN 650 MG: 325 TABLET ORAL at 00:35

## 2025-07-09 ASSESSMENT — ENCOUNTER SYMPTOMS
CHILLS: 0
HALLUCINATIONS: 0
ABDOMINAL PAIN: 0
VOMITING: 0
PALPITATIONS: 0
DYSURIA: 0
FEVER: 0
NAUSEA: 0
DIARRHEA: 0
CHEST TIGHTNESS: 0
JOINT SWELLING: 0
DIAPHORESIS: 0
FREQUENCY: 0
BLOOD IN STOOL: 0
LIGHT-HEADEDNESS: 0
WEAKNESS: 0
DIZZINESS: 0
FACIAL SWELLING: 0
TROUBLE SWALLOWING: 0
BRUISES/BLEEDS EASILY: 0
BACK PAIN: 0
HEADACHES: 0
APPETITE CHANGE: 0
WHEEZING: 0
SHORTNESS OF BREATH: 0
HEMATURIA: 0
SORE THROAT: 0
FATIGUE: 0
COUGH: 0
CONSTIPATION: 0
EYE PAIN: 0
WOUND: 0
NUMBNESS: 0
FLANK PAIN: 0

## 2025-07-09 ASSESSMENT — COGNITIVE AND FUNCTIONAL STATUS - GENERAL
EATING MEALS: A LITTLE
DRESSING REGULAR UPPER BODY CLOTHING: A LOT
PERSONAL GROOMING: A LITTLE
HELP NEEDED FOR BATHING: A LITTLE
DRESSING REGULAR LOWER BODY CLOTHING: A LOT
DAILY ACTIVITIY SCORE: 17
CLIMB 3 TO 5 STEPS WITH RAILING: A LITTLE
DRESSING REGULAR UPPER BODY CLOTHING: A LOT
EATING MEALS: A LITTLE
MOVING FROM LYING ON BACK TO SITTING ON SIDE OF FLAT BED WITH BEDRAILS: A LITTLE
TURNING FROM BACK TO SIDE WHILE IN FLAT BAD: A LITTLE
DAILY ACTIVITIY SCORE: 18
MOBILITY SCORE: 21
DRESSING REGULAR LOWER BODY CLOTHING: A LITTLE
TOILETING: A LITTLE
HELP NEEDED FOR BATHING: A LITTLE

## 2025-07-09 ASSESSMENT — PAIN SCALES - GENERAL
PAINLEVEL_OUTOF10: 5 - MODERATE PAIN
PAINLEVEL_OUTOF10: 3

## 2025-07-09 ASSESSMENT — ACTIVITIES OF DAILY LIVING (ADL)
HOME_MANAGEMENT_TIME_ENTRY: 16
ADL_ASSISTANCE: INDEPENDENT
BATHING_ASSISTANCE: MINIMAL

## 2025-07-09 ASSESSMENT — PAIN - FUNCTIONAL ASSESSMENT: PAIN_FUNCTIONAL_ASSESSMENT: 0-10

## 2025-07-09 NOTE — NURSING NOTE
Discharge instructions reviewed with wife and pt. Pt already has home meds. IV SL discontinued. Pt transported off floor via w/c and volunteer. Belongings with pt.

## 2025-07-09 NOTE — PROGRESS NOTES
Occupational Therapy    Evaluation/Treatment    Patient Name: Gen Solis II  MRN: [id number]  Department:   Room: [facility]3327-A  Today's Date: 07/09/25  Time Calculation  Start Time: 0928  Stop Time: 1004  Time Calculation (min): 36 min       Assessment:  OT Assessment: Pt is 70 year old male s/p R RTSA. Pt demo'd mobility with SBA and ADLs with MIN-MOD A. Education provided on precautions, exercises, adaptive techniques for ADLs. Answered all questions at time of session from patient and spouse. Pt would benefit from skilled OT services to address goals.  Prognosis: Good  Barriers to Discharge Home: No anticipated barriers  Evaluation/Treatment Tolerance: Patient tolerated treatment well  End of Session Communication: Bedside nurse  End of Session Patient Position: Up in chair, Alarm off, not on at start of session  OT Assessment Results: Decreased ADL status, Decreased IADLs, Decreased safe judgment during ADL  Prognosis: Good  Evaluation/Treatment Tolerance: Patient tolerated treatment well  Plan:  Treatment Interventions: ADL retraining, UE strengthening/ROM, Compensatory technique education, Patient/family training  OT Frequency: 3 times per week  OT Discharge Recommendations: Low intensity level of continued care  OT Recommended Transfer Status: Stand by assist  OT - OK to Discharge: Yes (When medically appropriate)  Treatment Interventions: ADL retraining, UE strengthening/ROM, Compensatory technique education, Patient/family training    Subjective   Current Problem:  1. Status post reverse total shoulder replacement, right  acetaminophen (Tylenol) 500 mg tablet    aspirin 325 mg EC tablet    docusate sodium (Colace) 100 mg capsule    meloxicam (Mobic) 15 mg tablet    gabapentin (Neurontin) 300 mg capsule    oxyCODONE (Roxicodone) 5 mg immediate release tablet    doxycycline (Vibramycin) 100 mg capsule      2. Arthritis of right shoulder [M19.011]        3. Complete tear of right rotator cuff, unspecified  whether traumatic [M75.121]        4. Right shoulder pain, unspecified chronicity [M25.511]        5. Biceps tendinitis of right shoulder [M75.21]        6. Arthritis of right shoulder region [M19.011]          OT Visit Info:  OT Received On: 07/09/25  General Visit Info:   General  Reason for Referral: POD # 1 Right shoulder reverse total shoulder arthroplasty, biceps tenodesis  Referred By: Dameon  Past Medical History Relevant to Rehab: HTN, HLD, HFpEF, T2DM, arthritis  Family/Caregiver Present: Yes  Caregiver Feedback: spouse present, very supportive  Prior to Session Communication: Bedside nurse  Patient Position Received: Up in chair, Alarm off, not on at start of session  General Comment: Pt seated with UE sling in place. Pt pleasant and cooperative.   Precautions:  UE Weight Bearing Status: Right Non-Weight Bearing  Post-Surgical Precautions: Right shoulder precautions  Braces Applied: Sling in place at all times  Precautions Comment: No active movement of operative shoulder. Patient to remain in sling at all times unless with therapy. Patient can come out of the sling for active range of motion of elbow, wrist, hand and fingers with therapy only. NWB to operative extremity. For anatomic total shoulder replacement, PROM and AAROM for external rotation not passed 30 degrees.            Pain:  Pain Assessment  Pain Assessment: 0-10  0-10 (Numeric) Pain Score: 5 - Moderate pain  Pain Type: Surgical pain  Pain Location: Shoulder  Pain Orientation: Right  Pain Interventions: Cold applied, Repositioned    Objective   Cognition:  Overall Cognitive Status: Within Functional Limits  Orientation Level: Oriented X4           Home Living:  Type of Home: House  Lives With: Spouse  Home Layout: One level  Home Access: Ramped entrance  Bathroom Shower/Tub: Walk-in shower  Bathroom Toilet: Handicapped height  Bathroom Equipment: Grab bars in shower, Shower chair with back  Home Living Comments: Spouse able to provided  assistance  Prior Function:  Level of [place]: Independent with ADLs and functional transfers, Independent with homemaking with ambulation  Receives Help From: Family  ADL Assistance: Independent  Homemaking Assistance: Independent  Ambulatory Assistance: Independent  Hand Dominance: Right  IADL History:     ADL:  Eating Assistance: Stand by  Grooming Assistance: Independent  Bathing Assistance: Minimal (Anticipated)  UE Dressing Assistance: Moderate  UE Dressing Deficit: Thread RUE, Pull over head  LE Dressing Assistance: Minimal (Anticipated)  Toileting Assistance with Device: Minimal (Anticipated)  Functional Assistance: Stand by (Anticipated)  Activities of Daily Living: Grooming  Grooming Comments: Discussed ability to use R hand to assist with light tasks including opening toothpaste    UE Bathing  UE Bathing Comments: Discussed modified pendulum position to assist with UE bathing    UE Dressing  UE Dressing Level of Assistance: Moderate assistance  UE Dressing Where Assessed: Chair (Standing)  UE Dressing Comments: Donning pull over shirt with MOD A using modified pendulum position and cuing for sequencing. Spouse able to assist with dressing.  Activity Tolerance:  Endurance: Endurance does not limit participation in activity  Activity Tolerance Comments: Denies lightheadedness or dizziness with mobility and position changes  Functional Standing Tolerance:     Bed Mobility/Transfers: Bed Mobility  Bed Mobility: No    Transfers  Transfer: Yes  Transfer 1  Transfer From 1: Sit to, Stand to  Transfer to 1: Sit, Stand  Technique 1: Sit to stand, Stand to sit  Transfer Level of Assistance 1: Close supervision  Trials/Comments 1: STS x4 during session with SBA, verbal cuing for hand palcement and safety      Functional Mobility:  Functional Mobility  Functional Mobility Performed: Yes  Functional Mobility 1  Surface 1: Level tile  Device 1: No device  Assistance 1: Close supervision  Quality of Functional  Mobility 1: Wide base of support  Comments 1: Functional mobility for household distance within hosptial room with SBA  Sitting Balance:  Static Sitting Balance  Static Sitting-Balance Support: Left upper extremity supported, No upper extremity supported  Static Sitting-Level of Assistance: Independent  Dynamic Sitting Balance  Dynamic Sitting-Balance Support: Left upper extremity supported, No upper extremity supported  Dynamic Sitting-Level of Assistance: Distant supervision  Standing Balance:  Static Standing Balance  Static Standing-Balance Support: Left upper extremity supported, No upper extremity supported  Static Standing-Level of Assistance: Distant supervision  Dynamic Standing Balance  Dynamic Standing-Balance Support: Left upper extremity supported, No upper extremity supported  Dynamic Standing-Level of Assistance: Close supervision         Therapy/Activity: Therapeutic Exercise  Therapeutic Exercise Performed: Yes  Therapeutic Exercise Activity 1: R elbow flexion/extension, R supination/pronation, R wrist flexion/extension, R wrist ulnar/radial deviation, and R hand exercises x 5    Therapeutic Activity  Therapeutic Activity Performed: Yes  Therapeutic Activity 1: Education on adaptive techniques for ADLs and cuing for safety to maintain precautions/restrictions.       Vision:Vision - Basic Assessment  Current Vision: No visual deficits  Sensation:  Sensation Comment: Denies numbness and tingling  Strength:  Strength Comments: LUE grossly 4-/5; Pt reports plan for TSA of L shoulder at later date        Hand Function:  Hand Function  Gross Grasp: Functional  Coordination: Functional      Outcome Measures: [facility] Daily Activity  Putting on and taking off regular lower body clothing: A little  Bathing (including washing, rinsing, drying): A little  Putting on and taking off regular upper body clothing: A lot  Toileting, which includes using toilet, bedpan or urinal: A little  Taking care of personal  grooming such as brushing teeth: A little  Eating Meals: A little  Daily Activity - Total Score: 17        Education Documentation  Handouts, taught by Roopa Stanley OT at 7/9/2025 11:40 AM.  Learner: Significant Other, Patient  Readiness: Acceptance  Method: Explanation  Response: Verbalizes Understanding    Body Mechanics, taught by Roopa Stanley OT at 7/9/2025 11:40 AM.  Learner: Significant Other, Patient  Readiness: Acceptance  Method: Explanation  Response: Verbalizes Understanding    Precautions, taught by Roopa Stanley OT at 7/9/2025 11:40 AM.  Learner: Significant Other, Patient  Readiness: Acceptance  Method: Explanation  Response: Verbalizes Understanding    Home Exercise Program, taught by Roopa Stanley OT at 7/9/2025 11:40 AM.  Learner: Significant Other, Patient  Readiness: Acceptance  Method: Explanation  Response: Verbalizes Understanding    ADL Training, taught by Roopa Stanley OT at 7/9/2025 11:40 AM.  Learner: Significant Other, Patient  Readiness: Acceptance  Method: Explanation  Response: Verbalizes Understanding    Education Comments  No comments found.        OP EDUCATION:  Education  Education Comment: Education provided on WB and ROM restrictions, hand out provided. Discussed adaptive techniques to maintain precautions to perform ADLs. Instructions provided on RUE distal exercises.    Goals:  Encounter Problems       Encounter Problems (Active)       ADLs       Patient with complete upper body dressing with supervision level of assistance  (Progressing)       Start:  07/09/25    Expected End:  07/16/25            Patient with complete lower body dressing with modified independent level of assistance (Progressing)       Start:  07/09/25    Expected End:  07/16/25            Patient will complete toileting including hygiene clothing management/hygiene with modified independent level of assistance. (Progressing)       Start:  07/09/25    Expected End:  07/16/25                COGNITION/SAFETY       Patient will recall and adhere to weight bearing and ROM restrictions with all ADL and functional mobility in order to promote healing and safety with functional tasks (Progressing)       Start:  07/09/25    Expected End:  07/16/25

## 2025-07-09 NOTE — PROGRESS NOTES
"Gen Solis II is a 70 y.o. male on day 0 of admission presenting with Status post reverse total shoulder replacement, right.      Subjective   Gen Solis II is a very pleasant 70 y.o. male with past medical history s/f HTN, HLD, HFpEF, JAMIL (no PAP), T2DM, anxiety/depression, obesity, and severe arthritis and rotator cuff tearing (R shoulder), who was admitted to the orthopedic service s/p rTSA with Dr. Hirsch 7/8/25. Medicine was consulted for medical management. Patient describes that he is extremely sensitive to pain medications and will feel \"foggy\" with them at times, his wife confirms that he is really the only person in his family that is especially sensitive like this. We discussed if there was a particular medication that he can recall that he reacted this way with, and he doesn't recall there being one in particular. We discussed that if he takes any medications here that makes him feel this way, to let us know right away and we can always make some adjustments. He otherwise is without significant complaint.     7/9/2025: No acute events overnight. Vitals stable, 96% on 3L-easily weaned to room air. No AM labs.        Patient evaluated in the presence of RN and Patient's family    Review of Systems   Constitutional:  Negative for appetite change, chills, diaphoresis, fatigue and fever.   HENT:  Negative for congestion, ear pain, facial swelling, hearing loss, nosebleeds, sore throat, tinnitus and trouble swallowing.    Eyes:  Negative for pain.   Respiratory:  Negative for cough, chest tightness, shortness of breath and wheezing.    Cardiovascular:  Negative for chest pain, palpitations and leg swelling.   Gastrointestinal:  Negative for abdominal pain, blood in stool, constipation, diarrhea, nausea and vomiting.   Genitourinary:  Negative for dysuria, flank pain, frequency, hematuria and urgency.   Musculoskeletal:  Negative for back pain and joint swelling.        +R shoulder pain   Skin:  " Negative for rash and wound.   Neurological:  Negative for dizziness, syncope, weakness, light-headedness, numbness and headaches.   Hematological:  Does not bruise/bleed easily.   Psychiatric/Behavioral:  Negative for behavioral problems, hallucinations and suicidal ideas.           Objective     Last Recorded Vitals  /73 (BP Location: Left arm, Patient Position: Lying)   Pulse 74   Temp 35.8 °C (96.4 °F) (Temporal)   Resp 16   Wt 130 kg (286 lb)   SpO2 96%     Image Results  Imaging  XR shoulder right 2+ views  Result Date: 7/8/2025  Interval placement of intact reverse total right shoulder arthroplasty without acute fracture.   Signed by: Kg Saucedo 7/8/2025 2:33 PM Dictation workstation:   [id number]      Cardiology, Vascular, and Other Imaging  No other imaging results found for the past 7 days       Lab Results  Results for orders placed or performed during the hospital encounter of 07/08/25 (from the past 24 hours)   POCT GLUCOSE   Result Value Ref Range    POCT Glucose 123 (H) 74 - 99 mg/dL   POCT GLUCOSE   Result Value Ref Range    POCT Glucose 135 (H) 74 - 99 mg/dL   POCT GLUCOSE   Result Value Ref Range    POCT Glucose 136 (H) 74 - 99 mg/dL   POCT GLUCOSE   Result Value Ref Range    POCT Glucose 172 (H) 74 - 99 mg/dL   POCT GLUCOSE   Result Value Ref Range    POCT Glucose 112 (H) 74 - 99 mg/dL        Medications  Scheduled medications:  Scheduled Medications[1]  Continuous medications:  Continuous Medications[2]  PRN medications:  PRN Medications[3]     Physical Exam  Constitutional:       General: He is not in acute distress.     Appearance: Normal appearance. He is obese.      Comments: Sitting upright in bedside chair fully dressed in home-going clothing   HENT:      Head: Normocephalic and atraumatic.      Right Ear: External ear normal.      Left Ear: External ear normal.      Nose: Nose normal.      Mouth/Throat:      Mouth: Mucous membranes are moist.      Pharynx: Oropharynx is  "clear.   Eyes:      Extraocular Movements: Extraocular movements intact.      Conjunctiva/sclera: Conjunctivae normal.      Pupils: Pupils are equal, round, and reactive to light.   Cardiovascular:      Rate and Rhythm: Normal rate and regular rhythm.      Pulses: Normal pulses.      Heart sounds: Normal heart sounds.   Pulmonary:      Effort: Pulmonary effort is normal. No respiratory distress.      Breath sounds: Normal breath sounds. No wheezing, rhonchi or rales.   Abdominal:      General: Bowel sounds are normal.      Palpations: Abdomen is soft.      Tenderness: There is no abdominal tenderness. There is no right CVA tenderness, left CVA tenderness, guarding or rebound.   Musculoskeletal:      Cervical back: Normal range of motion and neck supple.      Comments: Post-operative dressing is C/D/I, did not remove dressing  NVS intact distal to operative site   RUE in sling   Skin:     General: Skin is warm and dry.      Capillary Refill: Capillary refill takes less than 2 seconds.      Findings: No rash.   Neurological:      General: No focal deficit present.      Mental Status: He is alert and oriented to person, place, and time. Mental status is at baseline.   Psychiatric:         Mood and Affect: Mood normal.         Behavior: Behavior normal.                  Assessment/Plan      Severe arthritis and rotator cuff tearing (R shoulder) s/p rTSA with Dr. Hirsch on 07/08/25  -Post-op course per primary  -Pain control; getting scheduled tylenol and ibuprofen, prn flexeril + oxycodone               -Patient is very sensitive to pain medications and reports he feels a \"foggy head\" with opiates at times. We discussed that if he takes any here and feels this way, to let us know. He does not recall which has specifically made him feel that way in the past.   -Constipation ppx  -DVT ppx  -PT/OT  -Incentive Spirometry      HTN, HLD  -BP: controlled on presentation, home therapies (Valsartan, Lasix) will be continued. " Close monitoring and adjust as needed.   -HLD: will be continued on home therapies (Zocor)       Chronic HFpEF   -Patient appears euvolemic on exam and is overall compensated from this standpoint   -Will be continued on home medications (lasix)  -Continue outpatient follow-up with cardiology/HF as scheduled      JAMIL  -Patient is not currently on any PAP therapies      DM-II   -Hold home oral meds for now   -Continue with SSI ACHS   -Accucheks, hypoglycemic protocol   -Monitor and adjust as needed       Anxiety, depression   -Patient will be continued on home therapies (Lexapro)      Obesity (BMI 39.89 on admission)   -Patient does not meet morbid/severe criteria for obesity on admission   -Continued outpatient follow-up with PCP, healthy dietary and lifestyle changes as able      Code Status: Full Code                 DVT ppx: Per primary    Thank you for involving us in the care of this very pleasant patient. We will follow along with you while they remain admitted. Please contact Hospitalist service if any clarification needed.        Please see orders for more complete plan    Patito Yoon PA-C         [1] acetaminophen, 650 mg, oral, q6h DISHA  aspirin, 81 mg, oral, BID  docusate sodium, 100 mg, oral, BID  doxycylcine, 100 mg, oral, q12h DISHA  escitalopram, 10 mg, oral, Daily  furosemide, 40 mg, oral, Daily  ibuprofen, 600 mg, oral, TID  insulin lispro, 0-10 Units, subcutaneous, Before meals & nightly  pneumoc 20-chilo conj-dip cr(PF), 0.5 mL, intramuscular, During hospitalization  simvastatin, 20 mg, oral, Nightly  valsartan, 320 mg, oral, Daily    [2]    [3] PRN medications: acetaminophen, cyclobenzaprine, dextrose, dextrose, glucagon, glucagon, melatonin, naloxone, ondansetron **OR** ondansetron, oxyCODONE, oxyCODONE

## 2025-07-09 NOTE — PROGRESS NOTES
Physical Therapy                 Therapy Communication Note    Patient Name: Gen Solis II  MRN: [id number]  Department: [facility] [place]note 2[place] 3 E  Room: [facility]332A  Today's Date: 7/9/2025     Discipline: Physical Therapy      Comment: Observed patient stand from chair independently with independent walking 60ft with no dizziness in the room. Cueing for pacing of activity as tends to move quickly. Verbalized understanding of instructions. No further acute PT needs. Discharge from PT at this time.      Completion of this session, clinical decision making, and documentation performed under the supervision/direction of Bev Hoang.      PREM TOURE-PT

## 2025-07-09 NOTE — CARE PLAN
Problem: Pain - Adult  Goal: Verbalizes/displays adequate comfort level or baseline comfort level  Outcome: Progressing     Problem: Safety - Adult  Goal: Free from fall injury  Outcome: Progressing     Problem: Discharge Planning  Goal: Discharge to home or other facility with appropriate resources  Outcome: Progressing        The clinical goals for the shift include Pt will rate pain a 4 or less by end of this shift.  Pt rates pain a 3/10. Expected to discharge home today.

## 2025-07-25 ENCOUNTER — HOSPITAL ENCOUNTER (OUTPATIENT)
Dept: RADIOLOGY | Facility: CLINIC | Age: 70
Discharge: HOME | End: 2025-07-25
Payer: MEDICARE

## 2025-07-25 ENCOUNTER — APPOINTMENT (OUTPATIENT)
Dept: ORTHOPEDIC SURGERY | Facility: CLINIC | Age: 70
End: 2025-07-25
Payer: MEDICARE

## 2025-07-25 VITALS — BODY MASS INDEX: 40.04 KG/M2 | HEIGHT: 71 IN | WEIGHT: 286 LBS

## 2025-07-25 DIAGNOSIS — M25.511 RIGHT SHOULDER PAIN, UNSPECIFIED CHRONICITY: ICD-10-CM

## 2025-07-25 DIAGNOSIS — Z96.611 STATUS POST REVERSE TOTAL SHOULDER REPLACEMENT, RIGHT: Primary | ICD-10-CM

## 2025-07-25 PROCEDURE — 73030 X-RAY EXAM OF SHOULDER: CPT | Mod: RT

## 2025-07-25 PROCEDURE — 1159F MED LIST DOCD IN RCRD: CPT

## 2025-07-25 PROCEDURE — 4010F ACE/ARB THERAPY RXD/TAKEN: CPT

## 2025-07-25 PROCEDURE — 99024 POSTOP FOLLOW-UP VISIT: CPT

## 2025-07-25 PROCEDURE — 1160F RVW MEDS BY RX/DR IN RCRD: CPT

## 2025-07-25 PROCEDURE — 3008F BODY MASS INDEX DOCD: CPT

## 2025-07-25 ASSESSMENT — PAIN - FUNCTIONAL ASSESSMENT: PAIN_FUNCTIONAL_ASSESSMENT: NO/DENIES PAIN

## 2025-07-25 NOTE — PROGRESS NOTES
History of Present Illness  No chief complaint on file.      70 y.o. male is 2 weeks status post right reverse total shoulder arthroplasty with biceps tenodesis.  Overall the patient states that he is doing quite well.  He states that he is not taking anything for pain at this point.  He states that his pain is well-controlled without medication.  He denies any concerns with incision.  No fever chills or drainage.  No chest pain or shortness of breath that he endorses.  No Pain.  He continues to wear his sling at all times.  No radicular symptoms that he endorses.  They state pain is well controlled and continuing to improve. State they are continuing to progress well.    Review of Systems   GENERAL: Negative for malaise, significant weight loss, fever, chills  MUSCULOSKELETAL: see HPI  NEURO:  Negative    Exam  right shoulder incisions are clean dry intact well-healed. No evidence of infection today.  Staples were removed in the office today.  Axillary skin patch is intact today.  Patient has minimal discomfort with gentle range of motion of his right shoulder today.  Elbow and wrist range of motion are nonirritable.  JAY LIN is NVI.    Imaging:  X-rays of the patient were ordered by Kristi Xiao PA-C and obtained today.  Kristi Xiao PA-C personally reviewed the results of the x-rays.    In addition, Kristi Xiao PA-C independently interpreted the patient's x-rays (performed by the Radiology department) by viewing the x-ray images and this is Kristi Xiao PA-C's personal interpretation:       X-rays of the right shoulder demonstrate well-positioned reverse shoulder arthroplasty without acute concerns.    Assessment  Patient is 2 weeks status post right reverse total shoulder arthroplasty with biceps tenodesis.    Plan  Overall, the patient is doing very well after their surgery and is pleased with their progress. Today we removed the stitches and applied Steri-Strips. It is okay for the patient to shower but  avoid soaking the wounds (no pools or bathtubs) for 3-4 more weeks until the wounds are completely healed.  The patient can slowly increase their activity levels, but must do so slowly to avoid aggravating the joint.  We gave the patient a prescription for physical therapy to work on range of motion and strengthening of the extremity.  Patient should remain in his sling until he is 4 weeks postoperative and then he can discontinue it.  We will plan on seeing the patient back in 8 more weeks. I told the patient to call with any questions or problems.

## 2025-07-25 NOTE — PATIENT INSTRUCTIONS
BMI was above normal measurement. Current weight: 130 kg (286 lb)  Weight change since last visit (-) denotes wt loss 0 lbs   Weight loss needed to achieve BMI 25: 107.1 Lbs  Weight loss needed to achieve BMI 30: 71.4 Lbs  Advised to Increase physical activity.

## 2025-07-28 NOTE — PROGRESS NOTES
"  Physical Therapy  Physical Therapy Orthopedic Evaluation    Patient Name: Gen Solis II  MRN: [id number]  Today's Date: 7/29/2025  Time Calculation  Start Time: 0655  Stop Time: 0740  Time Calculation (min): 45 min    PT Evaluation Time Entry  PT Evaluation (Low) Time Entry: 15  PT Therapeutic Procedures Time Entry  Therapeutic Exercise Time Entry: 15  Manual Therapy Time Entry: 10       Insurance:  Visit number: 1  Authorization info: auth req  Insurance Type: Payor: [employer] MEDICARE / Plan: [employer] GOLD CHOICE / Product Type: *No Product type* /      Current Problem     Problem List Items Addressed This Visit           ICD-10-CM    Complete tear of right rotator cuff M75.121    Relevant Orders    Follow Up In Physical Therapy    Arthritis of right shoulder region M19.011    Relevant Orders    Follow Up In Physical Therapy    Status post reverse total shoulder replacement, 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  Patient should remain in his sling until he is 4 weeks postoperative and then he can discontinue it. \" Per Kristi Xiao Pa-C  Subjective:   Subjective   Chief Complaint: R rev TSA  Onset: Chronic  LISSY: Chronic worsening    General:     Current Condition:   Better, feels like he is turning the corner. Using the arm a little more     Pain:     Location: R shoulder  Rating: Best-0, Current-0, Worst-2  Description: tight at rest,   Aggravating Factors:  stairs  Relieving Factors:  rest, propping arm    Relevant Information (PMH & Previous Tests/Imaging): + imaging prior to surgery    Previous Interventions/Treatments: Physical Therapy    Prior Level of Function (PLOF)  Patient previously independent with all " ADLs  Exercise/Physical Activity: yardwork  Work/School: Retired,      Patients Living Environment: Reviewed and no concern  Primary Language: English  Patient's Goal(s) for Therapy: Functional use of R shoulder    Red Flags: Do you have any of the following? No  Fever/chills, unexplained weight changes, dizziness/fainting, unexplained change in bowel or bladder functions, unexplained malaise or muscle weakness, night pain/sweats, numbness or tingling    Objective:  Objective   Dominant Hand: R handed  Palpation/Observation  Mild TTP lateral deltoid  Posture  Within normal limits presents without postoperative immobilizer  Curvatures: Mild obesity  Head position:mild loss of cervical lordosis  Special Testing  Not performed  ROM  Eval  Passive range of motion of the right shoulder reveals 105 degrees of flexion, internal rotation rest to his abdomen without overpressure, external rotation not assessed   No restriction wrist hand elbow  Cervical mild tightness for rotation        Soft Tissue: Mild tightness surrounding anterior and lateral deltoid, mild upper trap tightness secondary to posture bilaterally  Joint mobility: Not assessed secondary to recent surgery    Strength  Eval  3/5 wrist hand elbow no resistance applied   Sensation  Full sensation  Reflexes      Transfers: Requires mild UE assist for supine to sit and sit to stand   Gait: WNL  Functional Reach test ER not assessed secondary to recent surgery  Functional IR behind the back not assessed secondary to recent surgery    Rehab Fall Risk: No fall risk identified    This patient is identified as a low fall risk based on the highest level factors present.    Outpatient Rehab Fall Risk Interventions:  Low Fall Risk Interventions: Low Fall Risk Interventions: Optimize clinic environment ensuring safe and accessible pathways     Outcome Measures:  Other Measures  Other Outcome Measures: Qdash 30/55   Eval  Treatments:  Ther-EX:  Light ROM  exercises intitiated today  Table slides x 10 flexion  Supine active assisted cane flexion x 10  Pendulums forward backwards x 10  Scapular retractions x 10  Elbow flexion for active range of motion no resistance x 10  Assisted wall slide x 10 flexion    Manual:  Passive range of motion of the right shoulder flexion,  Modalities:  Discussed utilization of ice    PT Evaluation Time Entry  PT Evaluation (Low) Time Entry: 15  PT Therapeutic Procedures Time Entry  Therapeutic Exercise Time Entry: 15  Manual Therapy Time Entry: 10       EDUCATION: Home exercise program, plan of care, activity modifications, pain management, and injury pathology     Code: [id number]     Medical History Form: Reviewed (scanned into chart)  Reviewed medical form, [place], Falls risk, Methodist beliefs, PHQ     Screening  Frequency  Date Last Completed   Spiritual and Cultural Beliefs   Screening  each visit or episode of care 7/8/2025   Falls Risk Screening  every ambulatory visit    Pain Screening  annually at primary care visit  10/21/2024   Domestic Violence screening  annually at primary care visit 10/21/2024   Elder Abuse Screening  annually at primary care visit 10/21/2024   Depression Screening  annually in the primary care setting 7/8/2025   Suicide Risk Screening  annually in the primary care setting 7/8/2025   Nutrition and Food Insecurity   Screening  at least annually at primary care visit  7/8/2025   Key Learner  annually in the primary care setting 7/8/2025   Drug Screen  7/25/2025 10:44 AM   Alcohol Screen  7/25/2025 10:44 AM   Advance Directive  10/21/2024       Time Calculation  Start Time: 0655  Stop Time: 0740  Time Calculation (min): 45 min  PT Evaluation Time Entry  PT Evaluation (Low) Time Entry: 15 PT Therapeutic Procedures Time Entry  Therapeutic Exercise Time Entry: 15  Manual Therapy Time Entry: 10          Assessment: Patient presents with signs and symptoms consistent with R rev TSA, resulting in limited  participation in pain-free ADLs and inability to perform at their prior level of function. Pt would benefit from physical therapy to address the impairments found & listed previously in the objective section in order to return to safe and pain-free ADLs and prior level of function.     Complexity:Low  Prognosis: Good  Response to care: Good  Clinical Presentation: Stable and/or uncomplicated characteristics  Personal Factors: Left shoulder OA    Insurance Authorization Information  Date of Evaluation: 7/29/25    Onset Date: 7/15/2025    Referring Physician: MANN Hirsch     Surgery in the Last 3 months:  yes    CPT Codes: Therapeutic Exercise: 67221, Therapeutic Activity: 93781, Neuromuscular Re-Education: 16521, Manual Therapy: 15295, Electric Stimulation (Attended): 74095, and Vasopneumatic Device: 75453     Diagnosis:   Problem List Items Addressed This Visit           ICD-10-CM    Complete tear of right rotator cuff M75.121    Relevant Orders    Follow Up In Physical Therapy    Arthritis of right shoulder region M19.011    Relevant Orders    Follow Up In Physical Therapy    Status post reverse total shoulder replacement, 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    Relevant Orders    Follow Up In Physical Therapy          Functional Outcome:  Other Measures  Other Outcome Measures: Qdash 30/55    OT / PT Evaluation complexity:  low    Which of the following best describes the primary reason of therapy: Improving, restoring, or adapting functional mobility or skills    Visits Requested: 24    Date Range: 90 days    Select all conditions that apply: Left shoulder OA, arthritis      Billy Sapp, PT       Problem List:  Pain  Function  Mobility  Strength  Plan:     Planned Interventions include: therapeutic exercise, self-care home management, manual therapy, therapeutic activities, gait training, neuromuscular coordination, vasopneumatic, dry needling, aquatic therapy    Next Treatment: Active assisted range of motion, soft tissue  techniques for upper trap tightness right sided  Frequency: 2x a week  Duration: 12 Weeks  Goals: Set and discussed today    STG 6 Weeks  Patient to have pain less than 2/10 for improved flexion greater than 130 degrees active assisted  Patient to be independent with HEP and progression for improved carryover  Improved ability to perform active range of motion 120 degrees  Improved tolerance with sleeping greater than 4 hours for improved healing and exercise tolerance  LTG 12 Weeks  Patient to have improved QuickDASH score by 10 points for improved function at home  Patient to have pain less than 1/10 for improved ADL performance without limitations  Improved shoulder flexion strength 4+/5 for better function with daily activities and returning to leisure hobbies  ROM internal rotation to his back pocket improved to for participation in daily activities and hygiene      Plan of care was developed with input and agreement by the patient      Billy Sapp PT

## 2025-07-29 ENCOUNTER — EVALUATION (OUTPATIENT)
Dept: PHYSICAL THERAPY | Facility: CLINIC | Age: 70
End: 2025-07-29
Payer: MEDICARE

## 2025-07-29 DIAGNOSIS — Z96.611 STATUS POST REVERSE TOTAL SHOULDER REPLACEMENT, RIGHT: Primary | ICD-10-CM

## 2025-07-29 DIAGNOSIS — S46.011D TRAUMATIC COMPLETE TEAR OF RIGHT ROTATOR CUFF, SUBSEQUENT ENCOUNTER: ICD-10-CM

## 2025-07-29 DIAGNOSIS — M19.011 ARTHRITIS OF RIGHT SHOULDER REGION: ICD-10-CM

## 2025-07-29 PROCEDURE — 97140 MANUAL THERAPY 1/> REGIONS: CPT | Mod: GP | Performed by: PHYSICAL THERAPIST

## 2025-07-29 PROCEDURE — 97161 PT EVAL LOW COMPLEX 20 MIN: CPT | Mod: GP | Performed by: PHYSICAL THERAPIST

## 2025-07-29 PROCEDURE — 97110 THERAPEUTIC EXERCISES: CPT | Mod: GP | Performed by: PHYSICAL THERAPIST

## 2025-07-30 ENCOUNTER — APPOINTMENT (OUTPATIENT)
Facility: CLINIC | Age: 70
End: 2025-07-30
Payer: MEDICARE

## 2025-07-31 ENCOUNTER — TREATMENT (OUTPATIENT)
Dept: PHYSICAL THERAPY | Facility: CLINIC | Age: 70
End: 2025-07-31
Payer: MEDICARE

## 2025-07-31 ENCOUNTER — APPOINTMENT (OUTPATIENT)
Facility: CLINIC | Age: 70
End: 2025-07-31
Payer: MEDICARE

## 2025-07-31 DIAGNOSIS — M19.011 ARTHRITIS OF RIGHT SHOULDER REGION: ICD-10-CM

## 2025-07-31 DIAGNOSIS — S46.011D TRAUMATIC COMPLETE TEAR OF RIGHT ROTATOR CUFF, SUBSEQUENT ENCOUNTER: ICD-10-CM

## 2025-07-31 DIAGNOSIS — Z96.611 STATUS POST REVERSE TOTAL SHOULDER REPLACEMENT, RIGHT: Primary | ICD-10-CM

## 2025-08-04 ENCOUNTER — APPOINTMENT (OUTPATIENT)
Dept: PHYSICAL THERAPY | Facility: CLINIC | Age: 70
End: 2025-08-04
Payer: MEDICARE

## 2025-08-04 ENCOUNTER — TELEMEDICINE (OUTPATIENT)
Facility: CLINIC | Age: 70
End: 2025-08-04
Payer: MEDICARE

## 2025-08-04 DIAGNOSIS — J01.90 ACUTE SINUSITIS, RECURRENCE NOT SPECIFIED, UNSPECIFIED LOCATION: Primary | ICD-10-CM

## 2025-08-04 RX ORDER — DOXYCYCLINE 100 MG/1
100 CAPSULE ORAL 2 TIMES DAILY
Qty: 14 CAPSULE | Refills: 0 | Status: SHIPPED | OUTPATIENT
Start: 2025-08-04 | End: 2025-08-11

## 2025-08-04 NOTE — PROGRESS NOTES
Family Medicine Clinic Note    Today's Date: 2025       HPI: Gen Solis II is a 70 y.o. male who presents today with chief complaint of head congestion.     - Patient reports greater than 2-week history of nasal congestion, sinus pressure, intermittent headache, ear fullness.  - States that he has had sinusitis in the past and this feels very similar.  - Has been taking Mucinex with no relief of symptoms  - Denies fever, chills, shortness of breath, cough, or wheezing  - Reports tenderness on his sinuses when he presses on them  - States that he is bringing up the lot of mucus from his mouth.  - He was using nasal saline in the past, but has no longer been using it.  - No recent travel, no sick contacts.    Review of Systems: 10 point review of systems negative and noncontributory unless listed in HPI above.     Past Medical History:  Medical History[1]     Past Surgical History:   Surgical History[2]     Family History:  Family History[3]     Social History:  Social History     Socioeconomic History    Marital status:      Spouse name: Not on file    Number of children: Not on file    Years of education: Not on file    Highest education level: Not on file   Occupational History    Not on file   Tobacco Use    Smoking status: Former     Current packs/day: 0.00     Average packs/day: 1.5 packs/day for 20.0 years (30.0 ttl pk-yrs)     Types: Cigarettes     Start date: 1977     Quit date: 1997     Years since quittin.4     Passive exposure: Never    Smokeless tobacco: Never   Vaping Use    Vaping status: Never Used   Substance and Sexual Activity    Alcohol use: Yes     Alcohol/week: 4.0 standard drinks of alcohol     Types: 4 Cans of beer per week     Comment: occ    Drug use: Yes     Types: Marijuana     Comment: on vacation, last use     Sexual activity: Defer   Other Topics Concern    Not on file   Social History Narrative    Not on file     Social Drivers of Health     Financial  Resource Strain: Low Risk  (7/8/2025)    Overall Financial Resource Strain (CARDIA)     Difficulty of Paying Living Expenses: Not very hard   Food Insecurity: No Food Insecurity (7/8/2025)    Hunger Vital Sign     Worried About Running Out of Food in the Last Year: Never true     Ran Out of Food in the Last Year: Never true   Transportation Needs: No Transportation Needs (7/8/2025)    PRAPARE - Transportation     Lack of Transportation (Medical): No     Lack of Transportation (Non-Medical): No   Physical Activity: Not on file   Stress: Not on file   Social Connections: Not on file   Intimate Partner Violence: Not At Risk (7/8/2025)    Humiliation, Afraid, Rape, and Kick questionnaire     Fear of Current or Ex-Partner: No     Emotionally Abused: No     Physically Abused: No     Sexually Abused: No   Housing Stability: Low Risk  (7/8/2025)    Housing Stability Vital Sign     Unable to Pay for Housing in the Last Year: No     Number of Times Moved in the Last Year: 0     Homeless in the Last Year: No       Current Medications:   Current Medications[4]    Allergies:   Allergies[5]     Objective:  Vitals: There were no vitals taken for this visit.  There is no height or weight on file to calculate BMI.     Physical Exam:  GENERAL: Alert, oriented, well nourished, age-appropriate, pleasant, in no acute distress.  Noted to be sniffling and bringing up mucus.  HEENT: Head grossly normocephalic, atraumatic. EOMI as visualized on video call.    CARDIOVASCULAR: Grossly well-perfused  LUNGS:  good respiratory effort, no increased work of breathing noted on video call  NEURO: Alert and oriented to person, place, and time.   PSYCH: Appropriate mood and affect.  SKIN: No rashes or lesions appreciated on the face or hands    Assessment/Plan   Diagnoses and all orders for this visit:  Acute sinusitis, recurrence not specified, unspecified location  -Has the patient is having a greater than 2-week history of nasal congestion, sinus  pressure and fullness, and ear fullness, we will treat him for a acute sinusitis at this time.  Patient is allergic to penicillins, and states that it gives him tingling in his arms and legs, as such we will prescribe 7-day course of doxycycline for his sinusitis.  -Advised patient to continue to use nasal saline, as well as Flonase for the next week.  Patient has both at home  -Advised plenty for hydration, warm soups, and rest.  -If he develops fever, chills, shortness of breath, wheezing, then please call our office.  -    Prescribed doxycycline (Vibramycin) 100 mg capsule; Take 1 capsule (100 mg) by mouth 2 times a day for 7 days. Take with at least 8 ounces (large glass) of water, do not lie down for 30 minutes after    All questions answered. Patient understands and agrees to the plan. Patient discussed with attending, Dr. López.    Tessie Pal DO  PGY-2, [facility]         [1]   Past Medical History:  Diagnosis Date    Anesthesia     anger, agitated , slighly violent    Anxiety     Cataract Aug 2022    Class 3 severe obesity with body mass index (BMI) of 40.0 to 44.9 in adult 03/17/2025    Contusion of lung 10/07/2024    Fracture of multiple ribs 10/07/2024    Fracture of transverse process of lumbar vertebra (Multi) 10/07/2024    Hyperlipidemia     Hypertension     Low testosterone in male     Medicare annual wellness visit, subsequent 04/10/2025    Colonoscopy    Obesity     Sleep apnea     does juhi use any device    Type 2 diabetes mellitus without complication, without long-term current use of insulin 03/17/2025   [2]   Past Surgical History:  Procedure Laterality Date    APPENDECTOMY      CATARACT EXTRACTION Bilateral     CHOLECYSTECTOMY      KNEE SURGERY Right     meniscus repair    NASAL SEPTUM SURGERY      REVERSE TOTAL SHOULDER ARTHROPLASTY Right 07/08/2025    RIGHT REVERSE total shoulder replacement; NOT an anatomic total shoulder replacement *23 HRS  OBS* ([employer] reverse shoulder arthroplasty equipment) - Right    VASECTOMY     [3]   Family History  Problem Relation Name Age of Onset    Alzheimer's disease Mother      Esophageal cancer Father      Diabetes Sister      Heart attack Sister      Heart attack Paternal Grandfather      Diabetes Paternal Grandfather      Stroke Mother's Sister      Alcohol abuse Maternal Grandfather Bialic     Cancer Father Adarsh Solis     Esophageal cancer Father Adarsh Solis     Heart disease Sister Jana Hersmen    [4]   Current Outpatient Medications:     acetaminophen (Tylenol) 500 mg tablet, Take 2 tablets every 8 hours for 5 days, then after 5 days take 2 tablets as needed for mild pain (1-3)., Disp: 90 tablet, Rfl: 0    amLODIPine (Norvasc) 10 mg tablet, Take 1 tablet (10 mg) by mouth once daily. Do not fill before October 10, 2024. (Patient not taking: Reported on 7/8/2025), Disp: 30 tablet, Rfl: 0    aspirin 325 mg EC tablet, Take 1 tablet (325 mg) by mouth 2 times a day., Disp: 60 tablet, Rfl: 0    chlorhexidine (Peridex) 0.12 % solution, Swish and Spit 15 ml the night before and the morning of surgery. (2 Doses) (Patient not taking: Reported on 7/8/2025), Disp: 120 mL, Rfl: 0    doxycycline (Vibramycin) 100 mg capsule, Take 1 capsule (100 mg) by mouth 2 times a day for 7 days. Take with at least 8 ounces (large glass) of water, do not lie down for 30 minutes after, Disp: 14 capsule, Rfl: 0    escitalopram (Lexapro) 10 mg tablet, Take 1 tablet (10 mg) by mouth once daily., Disp: 90 tablet, Rfl: 3    furosemide (Lasix) 40 mg tablet, Take 1 tablet (40 mg) by mouth once daily., Disp: 90 tablet, Rfl: 3    gabapentin (Neurontin) 300 mg capsule, Take 1 capsule (300 mg) by mouth once daily at bedtime for 5 days., Disp: 5 capsule, Rfl: 0    meloxicam (Mobic) 15 mg tablet, Take 1 tablet (15 mg) by mouth once daily as needed for moderate pain (4 - 6) (pain)., Disp: 30 tablet, Rfl: 0    multivitamin with minerals iron-free (Centrum  Silver), Take 1 tablet by mouth once daily., Disp: , Rfl:     naloxone (Narcan) 4 mg/0.1 mL nasal spray, Administer into affected nostril(s)., Disp: 2 each, Rfl: 0    olmesartan (BENIcar) 40 mg tablet, Take 1 tablet (40 mg) by mouth once daily., Disp: 90 tablet, Rfl: 3    omeprazole OTC (PriLOSEC OTC) 20 mg EC tablet, Take 1 tablet (20 mg) by mouth once daily in the morning. Take before meals. Do not crush, chew, or split. (Patient not taking: Reported on 7/8/2025), Disp: , Rfl:     oxyCODONE (Roxicodone) 5 mg immediate release tablet, Take 1 tablet (5 mg) by mouth every 6 hours if needed for severe pain (7 - 10) (Do not take within 3 hours of GABAPENTIN)., Disp: 28 tablet, Rfl: 0    simvastatin (Zocor) 20 mg tablet, Take 1 tablet (20 mg) by mouth once daily., Disp: 90 tablet, Rfl: 3    testosterone 20.25 mg/1.25 gram (1.62 %) gel in metered-dose pump, use 2 pumps EVERY DAY AS DIRECTED (Patient not taking: Reported on 7/8/2025), Disp: 75 g, Rfl: 0    tirzepatide (Mounjaro) 7.5 mg/0.5 mL pen injector, Inject 7.5 mg under the skin 1 (one) time per week., Disp: 2 mL, Rfl: 1  [5]   Allergies  Allergen Reactions    Bee Venom Protein (Honey Bee) Swelling    Codeine Other    Penicillins Unknown     Makes nerve endings tingle; tolerating amp/sulbactam 1/20/2023    Latex Rash and Unknown

## 2025-08-06 ENCOUNTER — APPOINTMENT (OUTPATIENT)
Dept: PHYSICAL THERAPY | Facility: CLINIC | Age: 70
End: 2025-08-06
Payer: MEDICARE

## 2025-08-06 DIAGNOSIS — Z96.611 STATUS POST REVERSE TOTAL SHOULDER REPLACEMENT, RIGHT: Primary | ICD-10-CM

## 2025-08-06 DIAGNOSIS — S46.011D TRAUMATIC COMPLETE TEAR OF RIGHT ROTATOR CUFF, SUBSEQUENT ENCOUNTER: ICD-10-CM

## 2025-08-06 DIAGNOSIS — M19.011 ARTHRITIS OF RIGHT SHOULDER REGION: ICD-10-CM

## 2025-08-10 ASSESSMENT — ENCOUNTER SYMPTOMS
DIZZINESS: 1
CONFUSION: 0
SEIZURES: 0
HUNGER: 1
SPEECH DIFFICULTY: 0
FATIGUE: 0
TREMORS: 0
SWEATS: 0
BLACKOUTS: 0
HEADACHES: 1
NERVOUS/ANXIOUS: 0
BLURRED VISION: 0

## 2025-08-11 ENCOUNTER — TREATMENT (OUTPATIENT)
Dept: PHYSICAL THERAPY | Facility: CLINIC | Age: 70
End: 2025-08-11
Payer: MEDICARE

## 2025-08-11 DIAGNOSIS — Z96.611 STATUS POST REVERSE TOTAL SHOULDER REPLACEMENT, RIGHT: Primary | ICD-10-CM

## 2025-08-11 DIAGNOSIS — S46.011D TRAUMATIC COMPLETE TEAR OF RIGHT ROTATOR CUFF, SUBSEQUENT ENCOUNTER: ICD-10-CM

## 2025-08-11 DIAGNOSIS — M19.011 ARTHRITIS OF RIGHT SHOULDER REGION: ICD-10-CM

## 2025-08-11 PROCEDURE — 97110 THERAPEUTIC EXERCISES: CPT | Mod: GP | Performed by: PHYSICAL THERAPIST

## 2025-08-11 PROCEDURE — 97140 MANUAL THERAPY 1/> REGIONS: CPT | Mod: GP | Performed by: PHYSICAL THERAPIST

## 2025-08-13 ENCOUNTER — APPOINTMENT (OUTPATIENT)
Dept: PHYSICAL THERAPY | Facility: CLINIC | Age: 70
End: 2025-08-13
Payer: MEDICARE

## 2025-08-13 ENCOUNTER — APPOINTMENT (OUTPATIENT)
Facility: CLINIC | Age: 70
End: 2025-08-13
Payer: MEDICARE

## 2025-08-13 VITALS
HEART RATE: 83 BPM | DIASTOLIC BLOOD PRESSURE: 82 MMHG | OXYGEN SATURATION: 96 % | WEIGHT: 295 LBS | HEIGHT: 72 IN | BODY MASS INDEX: 39.96 KG/M2 | SYSTOLIC BLOOD PRESSURE: 126 MMHG

## 2025-08-13 DIAGNOSIS — Z96.611 STATUS POST REVERSE TOTAL SHOULDER REPLACEMENT, RIGHT: Primary | ICD-10-CM

## 2025-08-13 DIAGNOSIS — S46.011D TRAUMATIC COMPLETE TEAR OF RIGHT ROTATOR CUFF, SUBSEQUENT ENCOUNTER: ICD-10-CM

## 2025-08-13 DIAGNOSIS — M19.011 ARTHRITIS OF RIGHT SHOULDER REGION: ICD-10-CM

## 2025-08-13 DIAGNOSIS — E78.2 MIXED HYPERLIPIDEMIA: ICD-10-CM

## 2025-08-13 DIAGNOSIS — J01.90 ACUTE SINUSITIS, RECURRENCE NOT SPECIFIED, UNSPECIFIED LOCATION: ICD-10-CM

## 2025-08-13 DIAGNOSIS — E11.9 TYPE 2 DIABETES MELLITUS WITHOUT COMPLICATION, WITHOUT LONG-TERM CURRENT USE OF INSULIN: Primary | ICD-10-CM

## 2025-08-13 LAB — POC HEMOGLOBIN A1C: 5.7 % (ref 4.2–6.5)

## 2025-08-13 PROCEDURE — 3044F HG A1C LEVEL LT 7.0%: CPT

## 2025-08-13 PROCEDURE — 4010F ACE/ARB THERAPY RXD/TAKEN: CPT

## 2025-08-13 PROCEDURE — 3079F DIAST BP 80-89 MM HG: CPT

## 2025-08-13 PROCEDURE — 83036 HEMOGLOBIN GLYCOSYLATED A1C: CPT

## 2025-08-13 PROCEDURE — 99214 OFFICE O/P EST MOD 30 MIN: CPT

## 2025-08-13 PROCEDURE — 3074F SYST BP LT 130 MM HG: CPT

## 2025-08-13 PROCEDURE — 1159F MED LIST DOCD IN RCRD: CPT

## 2025-08-13 PROCEDURE — 3008F BODY MASS INDEX DOCD: CPT

## 2025-08-13 RX ORDER — ROSUVASTATIN CALCIUM 10 MG/1
10 TABLET, COATED ORAL DAILY
Qty: 90 TABLET | Refills: 1 | Status: SHIPPED | OUTPATIENT
Start: 2025-08-13 | End: 2026-02-09

## 2025-08-13 RX ORDER — CEFDINIR 300 MG/1
300 CAPSULE ORAL 2 TIMES DAILY
Qty: 10 CAPSULE | Refills: 0 | Status: SHIPPED | OUTPATIENT
Start: 2025-08-13 | End: 2025-08-18

## 2025-08-13 RX ORDER — TIRZEPATIDE 10 MG/.5ML
10 INJECTION, SOLUTION SUBCUTANEOUS WEEKLY
Qty: 2 ML | Refills: 2 | Status: SHIPPED | OUTPATIENT
Start: 2025-08-13

## 2025-08-13 ASSESSMENT — PATIENT HEALTH QUESTIONNAIRE - PHQ9
1. LITTLE INTEREST OR PLEASURE IN DOING THINGS: NOT AT ALL
2. FEELING DOWN, DEPRESSED OR HOPELESS: NOT AT ALL
SUM OF ALL RESPONSES TO PHQ9 QUESTIONS 1 AND 2: 0

## 2025-08-14 ENCOUNTER — TREATMENT (OUTPATIENT)
Dept: PHYSICAL THERAPY | Facility: CLINIC | Age: 70
End: 2025-08-14
Payer: MEDICARE

## 2025-08-14 DIAGNOSIS — S46.011D TRAUMATIC COMPLETE TEAR OF RIGHT ROTATOR CUFF, SUBSEQUENT ENCOUNTER: ICD-10-CM

## 2025-08-14 DIAGNOSIS — Z96.611 STATUS POST REVERSE TOTAL SHOULDER REPLACEMENT, RIGHT: Primary | ICD-10-CM

## 2025-08-14 DIAGNOSIS — M19.011 ARTHRITIS OF RIGHT SHOULDER REGION: ICD-10-CM

## 2025-08-14 PROCEDURE — 97140 MANUAL THERAPY 1/> REGIONS: CPT | Mod: GP | Performed by: PHYSICAL THERAPIST

## 2025-08-14 PROCEDURE — 97110 THERAPEUTIC EXERCISES: CPT | Mod: GP | Performed by: PHYSICAL THERAPIST

## 2025-08-18 ENCOUNTER — TREATMENT (OUTPATIENT)
Dept: PHYSICAL THERAPY | Facility: CLINIC | Age: 70
End: 2025-08-18
Payer: MEDICARE

## 2025-08-18 DIAGNOSIS — M19.011 PRIMARY OSTEOARTHRITIS OF BOTH SHOULDERS: ICD-10-CM

## 2025-08-18 DIAGNOSIS — M19.011 ARTHRITIS OF RIGHT SHOULDER REGION: ICD-10-CM

## 2025-08-18 DIAGNOSIS — M19.012 PRIMARY OSTEOARTHRITIS OF BOTH SHOULDERS: ICD-10-CM

## 2025-08-18 DIAGNOSIS — S46.011D TRAUMATIC COMPLETE TEAR OF RIGHT ROTATOR CUFF, SUBSEQUENT ENCOUNTER: ICD-10-CM

## 2025-08-18 DIAGNOSIS — M25.511 CHRONIC PAIN OF BOTH SHOULDERS: ICD-10-CM

## 2025-08-18 DIAGNOSIS — Z96.611 STATUS POST REVERSE TOTAL SHOULDER REPLACEMENT, RIGHT: Primary | ICD-10-CM

## 2025-08-18 DIAGNOSIS — M25.512 CHRONIC PAIN OF BOTH SHOULDERS: ICD-10-CM

## 2025-08-18 DIAGNOSIS — G89.29 CHRONIC PAIN OF BOTH SHOULDERS: ICD-10-CM

## 2025-08-18 PROCEDURE — 97110 THERAPEUTIC EXERCISES: CPT | Mod: GP,CQ

## 2025-08-18 PROCEDURE — 97140 MANUAL THERAPY 1/> REGIONS: CPT | Mod: GP,CQ

## 2025-08-19 ENCOUNTER — OFFICE VISIT (OUTPATIENT)
Facility: CLINIC | Age: 70
End: 2025-08-19
Payer: MEDICARE

## 2025-08-19 VITALS
OXYGEN SATURATION: 95 % | BODY MASS INDEX: 40.01 KG/M2 | WEIGHT: 295 LBS | DIASTOLIC BLOOD PRESSURE: 80 MMHG | SYSTOLIC BLOOD PRESSURE: 130 MMHG | HEART RATE: 76 BPM

## 2025-08-19 DIAGNOSIS — R09.81 SINUS CONGESTION: Primary | ICD-10-CM

## 2025-08-19 DIAGNOSIS — J01.90 ACUTE SINUSITIS, RECURRENCE NOT SPECIFIED, UNSPECIFIED LOCATION: ICD-10-CM

## 2025-08-19 PROCEDURE — 1159F MED LIST DOCD IN RCRD: CPT

## 2025-08-19 PROCEDURE — 3044F HG A1C LEVEL LT 7.0%: CPT

## 2025-08-19 PROCEDURE — 4010F ACE/ARB THERAPY RXD/TAKEN: CPT

## 2025-08-19 PROCEDURE — 99213 OFFICE O/P EST LOW 20 MIN: CPT

## 2025-08-19 PROCEDURE — 3079F DIAST BP 80-89 MM HG: CPT

## 2025-08-19 PROCEDURE — 3075F SYST BP GE 130 - 139MM HG: CPT

## 2025-08-19 RX ORDER — LEVOCETIRIZINE DIHYDROCHLORIDE 5 MG/1
5 TABLET, FILM COATED ORAL EVERY EVENING
Qty: 30 TABLET | Refills: 0 | Status: SHIPPED | OUTPATIENT
Start: 2025-08-19 | End: 2025-09-18

## 2025-08-19 RX ORDER — DOXYCYCLINE 100 MG/1
100 CAPSULE ORAL 2 TIMES DAILY
Qty: 14 CAPSULE | Refills: 0 | Status: SHIPPED | OUTPATIENT
Start: 2025-08-19 | End: 2025-08-26

## 2025-08-20 ENCOUNTER — TREATMENT (OUTPATIENT)
Dept: PHYSICAL THERAPY | Facility: CLINIC | Age: 70
End: 2025-08-20
Payer: MEDICARE

## 2025-08-20 DIAGNOSIS — S46.011D TRAUMATIC COMPLETE TEAR OF RIGHT ROTATOR CUFF, SUBSEQUENT ENCOUNTER: ICD-10-CM

## 2025-08-20 DIAGNOSIS — M19.011 ARTHRITIS OF RIGHT SHOULDER REGION: ICD-10-CM

## 2025-08-20 DIAGNOSIS — Z96.611 STATUS POST REVERSE TOTAL SHOULDER REPLACEMENT, RIGHT: Primary | ICD-10-CM

## 2025-08-20 PROCEDURE — 97110 THERAPEUTIC EXERCISES: CPT | Mod: GP,CQ

## 2025-08-20 PROCEDURE — 97140 MANUAL THERAPY 1/> REGIONS: CPT | Mod: GP,CQ

## 2025-08-25 ENCOUNTER — TREATMENT (OUTPATIENT)
Dept: PHYSICAL THERAPY | Facility: CLINIC | Age: 70
End: 2025-08-25
Payer: MEDICARE

## 2025-08-25 DIAGNOSIS — S46.011D TRAUMATIC COMPLETE TEAR OF RIGHT ROTATOR CUFF, SUBSEQUENT ENCOUNTER: ICD-10-CM

## 2025-08-25 DIAGNOSIS — M19.011 ARTHRITIS OF RIGHT SHOULDER REGION: ICD-10-CM

## 2025-08-25 DIAGNOSIS — Z96.611 STATUS POST REVERSE TOTAL SHOULDER REPLACEMENT, RIGHT: Primary | ICD-10-CM

## 2025-08-25 PROCEDURE — 97140 MANUAL THERAPY 1/> REGIONS: CPT | Mod: GP | Performed by: PHYSICAL THERAPIST

## 2025-08-25 PROCEDURE — 97110 THERAPEUTIC EXERCISES: CPT | Mod: GP | Performed by: PHYSICAL THERAPIST

## 2025-08-27 ENCOUNTER — TREATMENT (OUTPATIENT)
Dept: PHYSICAL THERAPY | Facility: CLINIC | Age: 70
End: 2025-08-27
Payer: MEDICARE

## 2025-08-27 DIAGNOSIS — S46.011D TRAUMATIC COMPLETE TEAR OF RIGHT ROTATOR CUFF, SUBSEQUENT ENCOUNTER: ICD-10-CM

## 2025-08-27 DIAGNOSIS — Z96.611 STATUS POST REVERSE TOTAL SHOULDER REPLACEMENT, RIGHT: Primary | ICD-10-CM

## 2025-08-27 DIAGNOSIS — M19.011 ARTHRITIS OF RIGHT SHOULDER REGION: ICD-10-CM

## 2025-08-27 PROCEDURE — 97140 MANUAL THERAPY 1/> REGIONS: CPT | Mod: GP,CQ

## 2025-08-27 PROCEDURE — 97110 THERAPEUTIC EXERCISES: CPT | Mod: GP,CQ

## 2025-09-05 ENCOUNTER — TELEPHONE (OUTPATIENT)
Facility: CLINIC | Age: 70
End: 2025-09-05
Payer: MEDICARE

## 2025-09-05 DIAGNOSIS — J01.10 ACUTE FRONTAL SINUSITIS, RECURRENCE NOT SPECIFIED: Primary | ICD-10-CM

## 2025-09-05 DIAGNOSIS — J32.9 SINUSITIS, UNSPECIFIED CHRONICITY, UNSPECIFIED LOCATION: Primary | ICD-10-CM

## 2025-09-05 RX ORDER — METHYLPREDNISOLONE 4 MG/1
TABLET ORAL
Qty: 21 TABLET | Refills: 0 | Status: SHIPPED | OUTPATIENT
Start: 2025-09-05

## 2025-09-09 ENCOUNTER — APPOINTMENT (OUTPATIENT)
Dept: OTOLARYNGOLOGY | Facility: CLINIC | Age: 70
End: 2025-09-09
Payer: MEDICARE

## 2025-09-18 ENCOUNTER — APPOINTMENT (OUTPATIENT)
Dept: OTOLARYNGOLOGY | Facility: CLINIC | Age: 70
End: 2025-09-18
Payer: MEDICARE

## 2025-09-19 ENCOUNTER — APPOINTMENT (OUTPATIENT)
Dept: ORTHOPEDIC SURGERY | Facility: CLINIC | Age: 70
End: 2025-09-19
Payer: MEDICARE

## (undated) DEVICE — SUTURE, VICRYL, 0, 36 IN, CT-1, UNDYED

## (undated) DEVICE — UC T-MAX

## (undated) DEVICE — KIT, BEACH CHAIR TRIMANO

## (undated) DEVICE — BLADE, SAGITTAL DUAL CUT, 25 X 90 X 1.27

## (undated) DEVICE — SUTURE, VICRYL, 2-0, 36 IN, CT-1, UNDYED

## (undated) DEVICE — SOLUTION, IRRIGATION, USP, SODIUM CHLORIDE 0.9%, 3000 ML

## (undated) DEVICE — SUTURE, FIBERWIRE 2, T-5 TAPER NEEDLE, 38"

## (undated) DEVICE — PAD, GROUNDING, ELECTROSURGICAL, W/9 FT CABLE, POLYHESIVE II, ADULT, LF

## (undated) DEVICE — GLOVE, SURGICAL, PROTEXIS PI BLUE W/NEUTHERA, 8.0, PF, LF

## (undated) DEVICE — DRAPE, SHEET, THREE QUARTER, FAN FOLD, 57 X 77 IN

## (undated) DEVICE — HOOD, SURGICAL, FLYTE, T7 PLUS, PEEL AWAY SHIELD

## (undated) DEVICE — HANDPIECE, INTERPULSE, W/FAN SPRAY TIP AND SUCTION TUBE

## (undated) DEVICE — Device

## (undated) DEVICE — DRAPE, SHEET, U, W/ADHESIVE STRIP, IMPERVIOUS, 60 X 70 IN, DISPOSABLE, LF, STERILE

## (undated) DEVICE — DRESSING, MEPILEX BORDER, POST-OP AG, 4 X 10 IN

## (undated) DEVICE — GOWN, ASTOUND, XL

## (undated) DEVICE — STOCKINETTE, IMPERVIOUS, 9 X 48 IN, STERILE

## (undated) DEVICE — DRAPE, INCISE, ANTIMICROBIAL, IOBAN 2, LARGE, 17 X 23 IN, DISPOSABLE, STERILE

## (undated) DEVICE — DRAPE, SHEET, U, STERI DRAPE, 47 X 51 IN, DISPOSABLE, STERILE

## (undated) DEVICE — DRAPE, SHEET, 17 X 23 IN

## (undated) DEVICE — DRESSING, GAUZE, SPONGE, VERSALON, ALL PURPOSE, 4 X 4 IN, SOFT

## (undated) DEVICE — MASK, FACE, TENET, FOAM POSITIONING, DISPOSABLE

## (undated) DEVICE — GLOVE, PROTEXIS PI CLASSIC, SZ-7.5, PF, LF

## (undated) DEVICE — APPLICATOR, CHLORAPREP, W/ORANGE TINT, 26ML

## (undated) DEVICE — SCREW DRILL, PERIPHERAL, 2.7MM

## (undated) DEVICE — COVER HANDLE LIGHT, STERIS, BLUE, STERILE

## (undated) DEVICE — SET-UP PACK, BASIC, MAYO STAND COVER, SUTURE BAG, TABLE COVER, LF

## (undated) DEVICE — DRAPE, INSTRUMENT, W/POUCH, STERI DRAPE, 7 X 11 IN, DISPOSABLE, STERILE

## (undated) DEVICE — KIT, OPEN SHOULDER, CUSTOM, PORTAGE